# Patient Record
Sex: FEMALE | Race: WHITE | NOT HISPANIC OR LATINO | Employment: OTHER | URBAN - METROPOLITAN AREA
[De-identification: names, ages, dates, MRNs, and addresses within clinical notes are randomized per-mention and may not be internally consistent; named-entity substitution may affect disease eponyms.]

---

## 2017-03-09 ENCOUNTER — TRANSCRIBE ORDERS (OUTPATIENT)
Dept: MAMMOGRAPHY | Facility: HOSPITAL | Age: 82
End: 2017-03-09

## 2017-03-09 ENCOUNTER — HOSPITAL ENCOUNTER (OUTPATIENT)
Dept: MAMMOGRAPHY | Facility: HOSPITAL | Age: 82
Discharge: HOME | End: 2017-03-09
Attending: RADIOLOGY | Admitting: RADIOLOGY

## 2017-03-09 DIAGNOSIS — R92.8 ABNORMAL MAMMOGRAM: Primary | ICD-10-CM

## 2017-03-09 DIAGNOSIS — R92.8 ABNORMAL MAMMOGRAM: ICD-10-CM

## 2017-03-09 PROCEDURE — G0204 DX MAMMO INCL CAD BI: HCPCS | Performed by: RADIOLOGY

## 2017-03-09 PROCEDURE — G0279 TOMOSYNTHESIS, MAMMO: HCPCS | Performed by: RADIOLOGY

## 2017-03-09 PROCEDURE — G0279 TOMOSYNTHESIS, MAMMO: HCPCS

## 2017-03-09 PROCEDURE — G0204 DX MAMMO INCL CAD BI: HCPCS

## 2017-05-08 ENCOUNTER — APPOINTMENT (OUTPATIENT)
Dept: GENERAL RADIOLOGY | Facility: HOSPITAL | Age: 82
End: 2017-05-08

## 2017-05-08 ENCOUNTER — HOSPITAL ENCOUNTER (EMERGENCY)
Facility: HOSPITAL | Age: 82
Discharge: HOME | End: 2017-05-08
Attending: EMERGENCY MEDICINE | Admitting: EMERGENCY MEDICINE

## 2017-05-08 ENCOUNTER — APPOINTMENT (OUTPATIENT)
Dept: MRI IMAGING | Facility: HOSPITAL | Age: 82
End: 2017-05-08

## 2017-05-08 VITALS
HEIGHT: 62 IN | DIASTOLIC BLOOD PRESSURE: 62 MMHG | OXYGEN SATURATION: 97 % | HEART RATE: 80 BPM | WEIGHT: 189 LBS | BODY MASS INDEX: 34.78 KG/M2 | TEMPERATURE: 97.9 F | RESPIRATION RATE: 16 BRPM | SYSTOLIC BLOOD PRESSURE: 137 MMHG

## 2017-05-08 DIAGNOSIS — H53.9 VISUAL DISTURBANCE: ICD-10-CM

## 2017-05-08 DIAGNOSIS — G44.1 OTHER VASCULAR HEADACHE: ICD-10-CM

## 2017-05-08 DIAGNOSIS — I10 ESSENTIAL HYPERTENSION: Primary | ICD-10-CM

## 2017-05-08 LAB
ALBUMIN SERPL-MCNC: 4.7 G/DL (ref 3.2–4.8)
ALBUMIN/GLOB SERPL: 1.7 G/DL (ref 1.5–2.5)
ALP SERPL-CCNC: 66 U/L (ref 25–100)
ALT SERPL W P-5'-P-CCNC: 13 U/L (ref 7–40)
ANION GAP SERPL CALCULATED.3IONS-SCNC: 7 MMOL/L (ref 3–11)
AST SERPL-CCNC: 17 U/L (ref 0–33)
BACTERIA UR QL AUTO: NORMAL /HPF
BASOPHILS # BLD AUTO: 0.02 10*3/MM3 (ref 0–0.2)
BASOPHILS NFR BLD AUTO: 0.3 % (ref 0–1)
BILIRUB SERPL-MCNC: 0.6 MG/DL (ref 0.3–1.2)
BILIRUB UR QL STRIP: NEGATIVE
BUN BLD-MCNC: 16 MG/DL (ref 9–23)
BUN/CREAT SERPL: 22.9 (ref 7–25)
CALCIUM SPEC-SCNC: 10.4 MG/DL (ref 8.7–10.4)
CHLORIDE SERPL-SCNC: 102 MMOL/L (ref 99–109)
CLARITY UR: CLEAR
CO2 SERPL-SCNC: 30 MMOL/L (ref 20–31)
COLOR UR: YELLOW
CREAT BLD-MCNC: 0.7 MG/DL (ref 0.6–1.3)
DEPRECATED RDW RBC AUTO: 44 FL (ref 37–54)
EOSINOPHIL # BLD AUTO: 0.23 10*3/MM3 (ref 0.1–0.3)
EOSINOPHIL NFR BLD AUTO: 2.9 % (ref 0–3)
ERYTHROCYTE [DISTWIDTH] IN BLOOD BY AUTOMATED COUNT: 13.4 % (ref 11.3–14.5)
GFR SERPL CREATININE-BSD FRML MDRD: 80 ML/MIN/1.73
GLOBULIN UR ELPH-MCNC: 2.7 GM/DL
GLUCOSE BLD-MCNC: 111 MG/DL (ref 70–100)
GLUCOSE UR STRIP-MCNC: NEGATIVE MG/DL
HCT VFR BLD AUTO: 43.7 % (ref 34.5–44)
HGB BLD-MCNC: 14.4 G/DL (ref 11.5–15.5)
HGB UR QL STRIP.AUTO: NEGATIVE
HOLD SPECIMEN: NORMAL
HOLD SPECIMEN: NORMAL
HYALINE CASTS UR QL AUTO: NORMAL /LPF
IMM GRANULOCYTES # BLD: 0.01 10*3/MM3 (ref 0–0.03)
IMM GRANULOCYTES NFR BLD: 0.1 % (ref 0–0.6)
KETONES UR QL STRIP: NEGATIVE
LEUKOCYTE ESTERASE UR QL STRIP.AUTO: ABNORMAL
LYMPHOCYTES # BLD AUTO: 1.76 10*3/MM3 (ref 0.6–4.8)
LYMPHOCYTES NFR BLD AUTO: 22.2 % (ref 24–44)
MAGNESIUM SERPL-MCNC: 2.1 MG/DL (ref 1.3–2.7)
MCH RBC QN AUTO: 29.3 PG (ref 27–31)
MCHC RBC AUTO-ENTMCNC: 33 G/DL (ref 32–36)
MCV RBC AUTO: 89 FL (ref 80–99)
MONOCYTES # BLD AUTO: 0.53 10*3/MM3 (ref 0–1)
MONOCYTES NFR BLD AUTO: 6.7 % (ref 0–12)
NEUTROPHILS # BLD AUTO: 5.37 10*3/MM3 (ref 1.5–8.3)
NEUTROPHILS NFR BLD AUTO: 67.8 % (ref 41–71)
NITRITE UR QL STRIP: NEGATIVE
PH UR STRIP.AUTO: 7 [PH] (ref 5–8)
PLATELET # BLD AUTO: 218 10*3/MM3 (ref 150–450)
PMV BLD AUTO: 9.8 FL (ref 6–12)
POTASSIUM BLD-SCNC: 3.7 MMOL/L (ref 3.5–5.5)
PROT SERPL-MCNC: 7.4 G/DL (ref 5.7–8.2)
PROT UR QL STRIP: NEGATIVE
RBC # BLD AUTO: 4.91 10*6/MM3 (ref 3.89–5.14)
RBC # UR: NORMAL /HPF
REF LAB TEST METHOD: NORMAL
SODIUM BLD-SCNC: 139 MMOL/L (ref 132–146)
SP GR UR STRIP: 1.01 (ref 1–1.03)
SQUAMOUS #/AREA URNS HPF: NORMAL /HPF
TROPONIN I SERPL-MCNC: 0 NG/ML (ref 0–0.07)
TROPONIN I SERPL-MCNC: 0 NG/ML (ref 0–0.07)
UROBILINOGEN UR QL STRIP: ABNORMAL
WBC NRBC COR # BLD: 7.92 10*3/MM3 (ref 3.5–10.8)
WBC UR QL AUTO: NORMAL /HPF
WHOLE BLOOD HOLD SPECIMEN: NORMAL
WHOLE BLOOD HOLD SPECIMEN: NORMAL

## 2017-05-08 PROCEDURE — 85025 COMPLETE CBC W/AUTO DIFF WBC: CPT | Performed by: EMERGENCY MEDICINE

## 2017-05-08 PROCEDURE — 93005 ELECTROCARDIOGRAM TRACING: CPT

## 2017-05-08 PROCEDURE — 96374 THER/PROPH/DIAG INJ IV PUSH: CPT

## 2017-05-08 PROCEDURE — 71010 HC CHEST PA OR AP: CPT

## 2017-05-08 PROCEDURE — 83735 ASSAY OF MAGNESIUM: CPT | Performed by: EMERGENCY MEDICINE

## 2017-05-08 PROCEDURE — 99284 EMERGENCY DEPT VISIT MOD MDM: CPT

## 2017-05-08 PROCEDURE — 93005 ELECTROCARDIOGRAM TRACING: CPT | Performed by: EMERGENCY MEDICINE

## 2017-05-08 PROCEDURE — 81001 URINALYSIS AUTO W/SCOPE: CPT | Performed by: EMERGENCY MEDICINE

## 2017-05-08 PROCEDURE — 84484 ASSAY OF TROPONIN QUANT: CPT

## 2017-05-08 PROCEDURE — 25010000002 HYDRALAZINE PER 20 MG: Performed by: EMERGENCY MEDICINE

## 2017-05-08 PROCEDURE — 80053 COMPREHEN METABOLIC PANEL: CPT | Performed by: EMERGENCY MEDICINE

## 2017-05-08 PROCEDURE — 70551 MRI BRAIN STEM W/O DYE: CPT

## 2017-05-08 RX ORDER — PROPRANOLOL HYDROCHLORIDE 10 MG/1
10 TABLET ORAL ONCE
Status: COMPLETED | OUTPATIENT
Start: 2017-05-08 | End: 2017-05-08

## 2017-05-08 RX ORDER — PROPRANOLOL HYDROCHLORIDE 10 MG/1
20 TABLET ORAL 2 TIMES DAILY
Qty: 60 TABLET | Refills: 0 | Status: SHIPPED | OUTPATIENT
Start: 2017-05-08 | End: 2019-09-16 | Stop reason: DRUGHIGH

## 2017-05-08 RX ORDER — PROPRANOLOL HYDROCHLORIDE 10 MG/1
10 TABLET ORAL 2 TIMES DAILY
COMMUNITY
End: 2017-05-08

## 2017-05-08 RX ORDER — SODIUM CHLORIDE 0.9 % (FLUSH) 0.9 %
10 SYRINGE (ML) INJECTION AS NEEDED
Status: DISCONTINUED | OUTPATIENT
Start: 2017-05-08 | End: 2017-05-08 | Stop reason: HOSPADM

## 2017-05-08 RX ORDER — ATORVASTATIN CALCIUM 40 MG/1
TABLET, FILM COATED ORAL
COMMUNITY
Start: 2014-01-14 | End: 2017-09-08

## 2017-05-08 RX ORDER — HYDRALAZINE HYDROCHLORIDE 20 MG/ML
20 INJECTION INTRAMUSCULAR; INTRAVENOUS ONCE
Status: COMPLETED | OUTPATIENT
Start: 2017-05-08 | End: 2017-05-08

## 2017-05-08 RX ORDER — LISINOPRIL AND HYDROCHLOROTHIAZIDE 20; 12.5 MG/1; MG/1
TABLET ORAL
COMMUNITY
Start: 2013-08-22 | End: 2021-11-15

## 2017-05-08 RX ORDER — AMLODIPINE BESYLATE 10 MG/1
10 TABLET ORAL DAILY
COMMUNITY
End: 2021-11-15

## 2017-05-08 RX ORDER — ASPIRIN 81 MG/1
81 TABLET, CHEWABLE ORAL DAILY
COMMUNITY
End: 2022-03-16

## 2017-05-08 RX ADMIN — PROPRANOLOL HYDROCHLORIDE 10 MG: 10 TABLET ORAL at 18:08

## 2017-05-08 RX ADMIN — HYDRALAZINE HYDROCHLORIDE 20 MG: 20 INJECTION INTRAMUSCULAR; INTRAVENOUS at 17:55

## 2017-09-07 ENCOUNTER — TRANSCRIBE ORDERS (OUTPATIENT)
Dept: MAMMOGRAPHY | Facility: HOSPITAL | Age: 82
End: 2017-09-07

## 2017-09-07 ENCOUNTER — HOSPITAL ENCOUNTER (OUTPATIENT)
Dept: MAMMOGRAPHY | Facility: HOSPITAL | Age: 82
Discharge: HOME | End: 2017-09-07
Attending: RADIOLOGY | Admitting: RADIOLOGY

## 2017-09-07 DIAGNOSIS — R92.8 ABNORMAL MAMMOGRAM: ICD-10-CM

## 2017-09-07 DIAGNOSIS — R92.8 ABNORMAL MAMMOGRAM: Primary | ICD-10-CM

## 2017-09-07 PROCEDURE — G0206 DX MAMMO INCL CAD UNI: HCPCS | Performed by: RADIOLOGY

## 2017-09-07 PROCEDURE — G0206 DX MAMMO INCL CAD UNI: HCPCS

## 2018-03-08 ENCOUNTER — HOSPITAL ENCOUNTER (OUTPATIENT)
Dept: MAMMOGRAPHY | Facility: HOSPITAL | Age: 83
Discharge: HOME | End: 2018-03-08
Attending: RADIOLOGY | Admitting: RADIOLOGY

## 2018-03-08 DIAGNOSIS — R92.8 ABNORMAL MAMMOGRAM: ICD-10-CM

## 2018-03-08 PROCEDURE — 77066 DX MAMMO INCL CAD BI: CPT | Performed by: RADIOLOGY

## 2018-03-08 PROCEDURE — 77066 DX MAMMO INCL CAD BI: CPT

## 2018-03-08 PROCEDURE — G0279 TOMOSYNTHESIS, MAMMO: HCPCS | Performed by: RADIOLOGY

## 2018-03-08 PROCEDURE — G0279 TOMOSYNTHESIS, MAMMO: HCPCS

## 2018-03-29 ENCOUNTER — TRANSCRIBE ORDERS (OUTPATIENT)
Dept: ADMINISTRATIVE | Facility: HOSPITAL | Age: 83
End: 2018-03-29

## 2018-03-29 DIAGNOSIS — R92.8 ABNORMAL MAMMOGRAM: Primary | ICD-10-CM

## 2019-03-14 ENCOUNTER — HOSPITAL ENCOUNTER (OUTPATIENT)
Dept: MAMMOGRAPHY | Facility: HOSPITAL | Age: 84
Discharge: HOME | End: 2019-03-14
Attending: RADIOLOGY | Admitting: RADIOLOGY

## 2019-03-14 DIAGNOSIS — R92.8 ABNORMAL MAMMOGRAM: ICD-10-CM

## 2019-03-14 PROCEDURE — G0279 TOMOSYNTHESIS, MAMMO: HCPCS | Performed by: RADIOLOGY

## 2019-03-14 PROCEDURE — 77066 DX MAMMO INCL CAD BI: CPT | Performed by: RADIOLOGY

## 2019-03-14 PROCEDURE — G0279 TOMOSYNTHESIS, MAMMO: HCPCS

## 2019-03-14 PROCEDURE — 77066 DX MAMMO INCL CAD BI: CPT

## 2019-03-22 ENCOUNTER — TRANSCRIBE ORDERS (OUTPATIENT)
Dept: RADIATION ONCOLOGY | Facility: HOSPITAL | Age: 84
End: 2019-03-22

## 2019-03-22 DIAGNOSIS — Z12.31 VISIT FOR SCREENING MAMMOGRAM: Primary | ICD-10-CM

## 2019-05-13 ENCOUNTER — OFFICE VISIT (OUTPATIENT)
Dept: ORTHOPEDIC SURGERY | Facility: CLINIC | Age: 84
End: 2019-05-13

## 2019-05-13 VITALS — BODY MASS INDEX: 34.12 KG/M2 | HEIGHT: 62 IN | HEART RATE: 89 BPM | WEIGHT: 185.41 LBS | OXYGEN SATURATION: 99 %

## 2019-05-13 DIAGNOSIS — M25.552 LEFT HIP PAIN: Primary | ICD-10-CM

## 2019-05-13 PROCEDURE — 99213 OFFICE O/P EST LOW 20 MIN: CPT | Performed by: ORTHOPAEDIC SURGERY

## 2019-05-13 RX ORDER — PROPRANOLOL HYDROCHLORIDE 20 MG/1
TABLET ORAL
Refills: 3 | COMMUNITY
Start: 2019-04-30

## 2019-05-13 NOTE — PROGRESS NOTES
[facility] [place] [facility]opa[facility] Clinic Note    Subjective     Chief Complaint   Patient presents with   • Left Hip - Pain     Patient goes to a work out class three times a week. She says that her pain hurts afterwards in the left hip.        HPI    Beth Fregoso is a 83 y.o. female.  She presents today for evaluation of left posterior hip pain.  The pain is been present for approximately 2 months, following a particular injury.  The pain radiates down the posterior aspect of the thigh down below the knee.  No groin pain.  The pain is 2-3 out of 10 currently, and is dull in quality.  It worsens with walking and exercise.      There is no problem list on file for this patient.    Past Medical History:   Diagnosis Date   • Breast cancer (CMS/HCC) 2014    Left    • Hx of radiation therapy 2014   • Hypertension       Past Surgical History:   Procedure Laterality Date   • BREAST BIOPSY Left 2014   • BREAST LUMPECTOMY Left 2014   • REDUCTION MAMMAPLASTY        Family History   Problem Relation Age of Onset   • Breast cancer Neg Hx    • Ovarian cancer Neg Hx      Social History     Socioeconomic History   • Marital status:      Spouse name: Not on file   • Number of children: Not on file   • Years of education: Not on file   • Highest education level: Not on file   Tobacco Use   • Smoking status: Never Smoker   • Smokeless tobacco: Never Used   Substance and Sexual Activity   • Alcohol use: Yes     Comment: SOCIAL   • Drug use: No   • Sexual activity: Defer      Current Outpatient Medications on File Prior to Visit   Medication Sig Dispense Refill   • amLODIPine (NORVASC) 10 MG tablet Take 10 mg by mouth Daily.     • aspirin 81 MG chewable tablet Chew 81 mg Daily.     • CALCIUM PO Take  by mouth.     • Cholecalciferol (VITAMIN D PO) Take  by mouth.     • FOLIC ACID PO Take  by mouth.     • lisinopril-hydrochlorothiazide (PRINZIDE,ZESTORETIC) 20-12.5 MG per tablet Take  by mouth.     • Multiple Vitamin (MULTI VITAMIN  "PO) Take  by mouth.     • propranolol (INDERAL) 10 MG tablet Take 2 tablets by mouth 2 (Two) Times a Day. 60 tablet 0   • propranolol (INDERAL) 20 MG tablet   3     No current facility-administered medications on file prior to visit.       No Known Allergies     Review of Systems   Constitutional: Positive for activity change.   HENT: Negative.    Eyes: Negative.    Respiratory: Negative.    Cardiovascular: Negative.    Gastrointestinal: Negative.    Endocrine: Negative.    Genitourinary: Negative.    Musculoskeletal: Positive for arthralgias.   Skin: Negative.    Allergic/Immunologic: Negative.    Neurological: Negative.    Hematological: Negative.    Psychiatric/Behavioral: Negative.         Objective      Physical Exam  Pulse 89   Ht 157.5 cm (62.01\")   Wt 84.1 kg (185 lb 6.5 oz)   SpO2 99%   Breastfeeding? No   BMI 33.90 kg/m²     Body mass index is 33.9 kg/m².    General:   Mental Status:  Alert   Appearance: Cooperative, in no acute distress   Build and Nutrition: Overweight female   Orientation: Alert and oriented to person, place and time   Posture: Normal   Gait: Normal    Integument:   Left hip: No skin lesions, no rash, no ecchymosis    Neurologic:   Sensation:    Left foot: Intact to light touch on the dorsal and plantar aspect   Motor:  Left lower extremity: 5/5 quadriceps, hamstrings, ankle dorsiflexors, and ankle plantar flexors  Vascular:   Left lower extremity: 2+ dorsalis pedis pulse, prompt capillary refill    Lower Extremity:   Left Hip:    Tenderness:  None    Swelling:  None    Crepitus:  None    Atrophy:  None    Range of motion:  External Rotation: 30°       Internal Rotation: 30°       Flexion:  100°       Extension:  0°   Instability:  None  Deformities:  None  Functional testing: Negative Stinchfield    No leg length discrepancy    Positive straight leg raise      Imaging/Studies      Imaging Results (last 24 hours)     Procedure Component Value Units Date/Time    XR Hip With or " Without Pelvis 1 View Left [[id number]] Resulted:  05/13/19 1137     Updated:  05/13/19 1138    Narrative:       Left Hip Radiographs  Indication: left hip pain  Views: low AP pelvis and lateral of the left hip    Comparison: no prior studies available for review    Findings:   Arthritic changes are seen, with spurs off of the femoral head, joint   space narrowing, irregularity at the greater tuberosity, with no acute   bony abnormalities.    Impression: Left hip arthritis          Assessment and Plan     Beth was seen today for pain.    Diagnoses and all orders for this visit:    Left hip pain  -     XR Hip With or Without Pelvis 1 View Left  -     Ambulatory Referral to Orthopedic Surgery        1. Left hip pain        I reviewed my findings with the patient today.  She has left posterior hip pain, that radiates down the posterior aspect of the thigh and into the leg.  I suspect that this is coming from her back, and have recommended referral to a spine specialist.  I will be happy to see her back if this seems to localize more to the hip joint.    Return if symptoms worsen or fail to improve.      Medical Decision Making  Data/Risk: radiology tests and independent visualization of imaging, lab tests, or EMG/NCV      Juan A Isaacs MD  05/13/19  7:24 PM

## 2019-08-11 PROCEDURE — 96374 THER/PROPH/DIAG INJ IV PUSH: CPT

## 2019-08-11 PROCEDURE — 96375 TX/PRO/DX INJ NEW DRUG ADDON: CPT

## 2019-08-11 PROCEDURE — 99284 EMERGENCY DEPT VISIT MOD MDM: CPT

## 2019-08-12 ENCOUNTER — APPOINTMENT (OUTPATIENT)
Dept: MRI IMAGING | Facility: HOSPITAL | Age: 84
End: 2019-08-12

## 2019-08-12 ENCOUNTER — HOSPITAL ENCOUNTER (EMERGENCY)
Facility: HOSPITAL | Age: 84
Discharge: HOME | End: 2019-08-12
Attending: EMERGENCY MEDICINE | Admitting: EMERGENCY MEDICINE

## 2019-08-12 VITALS
OXYGEN SATURATION: 96 % | SYSTOLIC BLOOD PRESSURE: 104 MMHG | DIASTOLIC BLOOD PRESSURE: 79 MMHG | TEMPERATURE: 98.1 F | HEIGHT: 61 IN | RESPIRATION RATE: 16 BRPM | WEIGHT: 178 LBS | BODY MASS INDEX: 33.61 KG/M2 | HEART RATE: 71 BPM

## 2019-08-12 DIAGNOSIS — E87.1 HYPONATREMIA: ICD-10-CM

## 2019-08-12 DIAGNOSIS — F41.9 ANXIETY: ICD-10-CM

## 2019-08-12 DIAGNOSIS — I10 ELEVATED BLOOD PRESSURE READING WITH DIAGNOSIS OF HYPERTENSION: Primary | ICD-10-CM

## 2019-08-12 LAB
ANION GAP SERPL CALCULATED.3IONS-SCNC: 13 MMOL/L (ref 5–15)
BASOPHILS # BLD AUTO: 0.05 10*3/MM3 (ref 0–0.2)
BASOPHILS NFR BLD AUTO: 0.6 % (ref 0–1.5)
BILIRUB UR QL STRIP: NEGATIVE
BUN BLD-MCNC: 14 MG/DL (ref 8–23)
BUN/CREAT SERPL: 22.2 (ref 7–25)
CALCIUM SPEC-SCNC: 9.6 MG/DL (ref 8.6–10.5)
CHLORIDE SERPL-SCNC: 87 MMOL/L (ref 98–107)
CLARITY UR: CLEAR
CO2 SERPL-SCNC: 30 MMOL/L (ref 22–29)
COLOR UR: NORMAL
CREAT BLD-MCNC: 0.63 MG/DL (ref 0.57–1)
DEPRECATED RDW RBC AUTO: 38.1 FL (ref 37–54)
EOSINOPHIL # BLD AUTO: 0.2 10*3/MM3 (ref 0–0.4)
EOSINOPHIL NFR BLD AUTO: 2.5 % (ref 0.3–6.2)
ERYTHROCYTE [DISTWIDTH] IN BLOOD BY AUTOMATED COUNT: 12.3 % (ref 12.3–15.4)
GFR SERPL CREATININE-BSD FRML MDRD: 90 ML/MIN/1.73
GLUCOSE BLD-MCNC: 122 MG/DL (ref 65–99)
GLUCOSE UR STRIP-MCNC: NEGATIVE MG/DL
HCT VFR BLD AUTO: 43.1 % (ref 34–46.6)
HGB BLD-MCNC: 14.6 G/DL (ref 12–15.9)
HGB UR QL STRIP.AUTO: NEGATIVE
IMM GRANULOCYTES # BLD AUTO: 0.03 10*3/MM3 (ref 0–0.05)
IMM GRANULOCYTES NFR BLD AUTO: 0.4 % (ref 0–0.5)
INR PPP: 1.02 (ref 0.85–1.16)
KETONES UR QL STRIP: NEGATIVE
LEUKOCYTE ESTERASE UR QL STRIP.AUTO: NEGATIVE
LYMPHOCYTES # BLD AUTO: 1.74 10*3/MM3 (ref 0.7–3.1)
LYMPHOCYTES NFR BLD AUTO: 21.8 % (ref 19.6–45.3)
MCH RBC QN AUTO: 29 PG (ref 26.6–33)
MCHC RBC AUTO-ENTMCNC: 33.9 G/DL (ref 31.5–35.7)
MCV RBC AUTO: 85.7 FL (ref 79–97)
MONOCYTES # BLD AUTO: 0.79 10*3/MM3 (ref 0.1–0.9)
MONOCYTES NFR BLD AUTO: 9.9 % (ref 5–12)
NEUTROPHILS # BLD AUTO: 5.18 10*3/MM3 (ref 1.7–7)
NEUTROPHILS NFR BLD AUTO: 64.8 % (ref 42.7–76)
NITRITE UR QL STRIP: NEGATIVE
NRBC BLD AUTO-RTO: 0 /100 WBC (ref 0–0.2)
PH UR STRIP.AUTO: 7.5 [PH] (ref 5–8)
PLATELET # BLD AUTO: 289 10*3/MM3 (ref 140–450)
PMV BLD AUTO: 9.6 FL (ref 6–12)
POTASSIUM BLD-SCNC: 4.1 MMOL/L (ref 3.5–5.2)
PROT UR QL STRIP: NEGATIVE
PROTHROMBIN TIME: 12.9 SECONDS (ref 11.2–14.3)
RBC # BLD AUTO: 5.03 10*6/MM3 (ref 3.77–5.28)
SODIUM BLD-SCNC: 130 MMOL/L (ref 136–145)
SP GR UR STRIP: 1.01 (ref 1–1.03)
UROBILINOGEN UR QL STRIP: NORMAL
WBC NRBC COR # BLD: 7.99 10*3/MM3 (ref 3.4–10.8)

## 2019-08-12 PROCEDURE — 85610 PROTHROMBIN TIME: CPT | Performed by: EMERGENCY MEDICINE

## 2019-08-12 PROCEDURE — 96375 TX/PRO/DX INJ NEW DRUG ADDON: CPT

## 2019-08-12 PROCEDURE — 81003 URINALYSIS AUTO W/O SCOPE: CPT | Performed by: EMERGENCY MEDICINE

## 2019-08-12 PROCEDURE — 85025 COMPLETE CBC W/AUTO DIFF WBC: CPT | Performed by: EMERGENCY MEDICINE

## 2019-08-12 PROCEDURE — 25010000002 LORAZEPAM PER 2 MG: Performed by: EMERGENCY MEDICINE

## 2019-08-12 PROCEDURE — 93005 ELECTROCARDIOGRAM TRACING: CPT | Performed by: EMERGENCY MEDICINE

## 2019-08-12 PROCEDURE — 80048 BASIC METABOLIC PNL TOTAL CA: CPT | Performed by: EMERGENCY MEDICINE

## 2019-08-12 PROCEDURE — 70551 MRI BRAIN STEM W/O DYE: CPT

## 2019-08-12 PROCEDURE — 96374 THER/PROPH/DIAG INJ IV PUSH: CPT

## 2019-08-12 RX ORDER — METOPROLOL TARTRATE 5 MG/5ML
5 INJECTION INTRAVENOUS ONCE
Status: COMPLETED | OUTPATIENT
Start: 2019-08-12 | End: 2019-08-12

## 2019-08-12 RX ORDER — LORAZEPAM 2 MG/ML
1 INJECTION INTRAMUSCULAR ONCE
Status: COMPLETED | OUTPATIENT
Start: 2019-08-12 | End: 2019-08-12

## 2019-08-12 RX ORDER — SODIUM CHLORIDE 0.9 % (FLUSH) 0.9 %
10 SYRINGE (ML) INJECTION AS NEEDED
Status: DISCONTINUED | OUTPATIENT
Start: 2019-08-12 | End: 2019-08-12 | Stop reason: HOSPADM

## 2019-08-12 RX ADMIN — METOPROLOL TARTRATE 5 MG: 5 INJECTION INTRAVENOUS at 02:10

## 2019-08-12 RX ADMIN — LORAZEPAM 1 MG: 2 INJECTION INTRAMUSCULAR; INTRAVENOUS at 02:41

## 2019-08-12 NOTE — DISCHARGE INSTRUCTIONS
Follow up with your primary care provider in 1 week for reevaluation.    Return to the ER if you develop any new or worsening symptoms.    Please review the medications you are supposed to be taking according to prior physician recommendations. I have not changed your home medications during this visit. If your discharge instructions indicate that I have changed your home medications, this is not the case, and you should disregard. If you have any questions about the medication you should be taking at home, please call your physician.     Please check your blood pressure 3 times a day. Keep a chart of these readings and any correlation to symptoms you might be having and bring this chart to the follow up with your primary care physician. If you don't already have a good blood pressure cuff, I recommend the following:    [employer] - Original Blood Pressure Monitor (for upper arm)    or similar upper arm blood pressure cuffs which can be purchased for around $25.

## 2019-08-12 NOTE — ED PROVIDER NOTES
Subjective   83-year-old female presents for evaluation of elevated blood pressure readings.  Of note, the patient has a history of hypertension.  She states that throughout the day today she has been checking her blood pressure and it seems to be creeping upward.  The more that she thinks about it, the more her blood pressure seems to be going up.  She tried taking an extra dose of her antihypertensive medication tonight without any significant change in her blood pressure readings.  She states that she felt a little bit dizzy tonight but currently feels well.  She denies any chest pain.  No headache, dizziness, blurred vision, shortness of breath, palpitations, or other systemic symptoms.  She states that she is simply worried about her blood pressure readings and it is making her feel quite anxious.        History provided by:  Patient  Hypertension   Severity:  Moderate  Duration:  5 hours  Timing:  Constant  Progression:  Worsening  Chronicity:  Recurrent  Time since last dose of antihypertensive:  4 hours  Notable PTA blood pressures:  Systolic pressure greater than 200  Relieved by:  Nothing  Ineffective treatments:  ACE inhibitors and Ca channel blockers  Associated symptoms: dizziness        Review of Systems   Gastrointestinal:        Positive for excessive burping.   Neurological: Positive for dizziness.   All other systems reviewed and are negative.      Past Medical History:   Diagnosis Date   • Breast cancer (CMS/HCC) 2014    Left    • Hx of radiation therapy 2014   • Hypertension        No Known Allergies    Past Surgical History:   Procedure Laterality Date   • BREAST BIOPSY Left 2014   • BREAST LUMPECTOMY Left 2014   • REDUCTION MAMMAPLASTY         Family History   Problem Relation Age of Onset   • Breast cancer Neg Hx    • Ovarian cancer Neg Hx        Social History     Socioeconomic History   • Marital status:      Spouse name: Not on file   • Number of children: Not on file   • Years of  education: Not on file   • Highest education level: Not on file   Tobacco Use   • Smoking status: Never Smoker   • Smokeless tobacco: Never Used   Substance and Sexual Activity   • Alcohol use: Yes     Comment: SOCIAL   • Drug use: No   • Sexual activity: Defer         Objective   Physical Exam   Constitutional: She is oriented to person, place, and time. She appears well-developed and well-nourished. No distress.   Well-appearing female in no acute distress   HENT:   Head: Normocephalic and atraumatic.   Mouth/Throat: Oropharynx is clear and moist. No oropharyngeal exudate.   Eyes: EOM are normal. Pupils are equal, round, and reactive to light.   No nystagmus, visual acuity at baseline subjectively   Neck: Normal range of motion. Neck supple.   Cardiovascular: Normal rate, regular rhythm, normal heart sounds and intact distal pulses. Exam reveals no gallop and no friction rub.   No murmur heard.  Pulmonary/Chest: Effort normal and breath sounds normal. No respiratory distress. She has no wheezes. She has no rales.   Abdominal: Soft. Bowel sounds are normal. She exhibits no distension and no mass. There is no tenderness. There is no rebound and no guarding.   Musculoskeletal: Normal range of motion.   Neurological: She is alert and oriented to person, place, and time. No cranial nerve deficit or sensory deficit. Coordination normal.   5 out of 5 strength in all fours, neurovascularly intact distally in all fours of bounding distal pulses and normal sensation, normal gait, clear and fluent speech, no droop, no drift, no ataxia or dysmetria   Skin: Skin is warm and dry. No rash noted. She is not diaphoretic. No erythema.   Psychiatric: She has a normal mood and affect. Judgment and thought content normal.   Nursing note and vitals reviewed.      Procedures         ED Course  ED Course as of Aug 12 0503   Mon Aug 12, 2019   0341 83-year-old female with a history of hypertension presents for evaluation of elevated blood  pressure readings tonight.  She states that she had some mild dizziness earlier in the evening that has resolved.  However, she states that despite taking an extra dose of her blood pressure medication at night, her blood pressure readings continue to rise, prompting her to come to the ED.  On arrival, patient hypertensive and a bit anxious but with otherwise unremarkable exam.  No focal neurological deficits noted.  NIH stroke scale of 0.  Labs remarkable for mild hyponatremia but revealed no other evidence of endorgan damage.  [DD]   0342 EKG revealed normal sinus rhythm with a heart rate of 75 and no ST segments suggestive of are concerning for ischemia.  Low dose of Ativan given for anxiolysis.  Small bolus of Lopressor given for elevated blood pressure.  [DD]   0401 MRI brain negative.  Upon reevaluation, patient completely asymptomatic.  Her blood pressure has normalized.  I do not feel that her current clinical presentation represents hypertensive emergency or hypertensive encephalopathy.  I feel that there was likely an anxiety component to her persistent elevated blood pressure readings as well.  Patient reassured.  She will continue to closely monitor her blood pressure over the coming days prior to following up with her primary care physician to discuss potential tweaks to her antihypertensive medication regimen.  She will follow-up later this week.  Agreeable with plan and given appropriate strict return precautions.  [DD]      ED Course User Index  [DD] Paul Harrison MD       No results found for this or any previous visit (from the past 24 hour(s)).  Note: In addition to lab results from this visit, the labs listed above may include labs taken at another facility or during a different encounter within the last 24 hours. Please correlate lab times with ED admission and discharge times for further clarification of the services performed during this visit.    No orders to display     Vitals:     "08/11/19 2328   BP: (!) 205/81   BP Location: Right arm   Patient Position: Sitting   Pulse: 80   Resp: 16   Temp: 98.1 °F (36.7 °C)   TempSrc: Oral   SpO2: 96%   Weight: 80.7 kg (178 lb)   Height: 154.9 cm (61\")     Medications - No data to display  ECG/EMG Results (last 24 hours)     ** No results found for the last 24 hours. **        No orders to display                 Recent Results (from the past 24 hour(s))   Basic Metabolic Panel    Collection Time: 08/12/19  1:57 AM   Result Value Ref Range    Glucose 122 (H) 65 - 99 mg/dL    BUN 14 8 - 23 mg/dL    Creatinine 0.63 0.57 - 1.00 mg/dL    Sodium 130 (L) 136 - 145 mmol/L    Potassium 4.1 3.5 - 5.2 mmol/L    Chloride 87 (L) 98 - 107 mmol/L    CO2 30.0 (H) 22.0 - 29.0 mmol/L    Calcium 9.6 8.6 - 10.5 mg/dL    eGFR Non African Amer 90 >60 mL/min/1.73    BUN/Creatinine Ratio 22.2 7.0 - 25.0    Anion Gap 13.0 5.0 - 15.0 mmol/L   Urinalysis With Microscopic If Indicated (No Culture) - Urine, Clean Catch    Collection Time: 08/12/19  1:57 AM   Result Value Ref Range    Color, UA Straw Yellow, Straw    Appearance, UA Clear Clear    pH, UA 7.5 5.0 - 8.0    Specific Gravity, UA 1.010 1.005 - 1.030    Glucose, UA Negative Negative    Ketones, UA Negative Negative    Bilirubin, UA Negative Negative    Blood, UA Negative Negative    Protein, UA Negative Negative    Leuk Esterase, UA Negative Negative    Nitrite, UA Negative Negative    Urobilinogen, UA 0.2 E.U./dL 0.2 - 1.0 E.U./dL   Protime-INR    Collection Time: 08/12/19  1:57 AM   Result Value Ref Range    Protime 12.9 11.2 - 14.3 Seconds    INR 1.02 0.85 - 1.16   CBC Auto Differential    Collection Time: 08/12/19  1:57 AM   Result Value Ref Range    WBC 7.99 3.40 - 10.80 10*3/mm3    RBC 5.03 3.77 - 5.28 10*6/mm3    Hemoglobin 14.6 12.0 - 15.9 g/dL    Hematocrit 43.1 34.0 - 46.6 %    MCV 85.7 79.0 - 97.0 fL    MCH 29.0 26.6 - 33.0 pg    MCHC 33.9 31.5 - 35.7 g/dL    RDW 12.3 12.3 - 15.4 %    RDW-SD 38.1 37.0 - 54.0 fl "    MPV 9.6 6.0 - 12.0 fL    Platelets 289 140 - 450 10*3/mm3    Neutrophil % 64.8 42.7 - 76.0 %    Lymphocyte % 21.8 19.6 - 45.3 %    Monocyte % 9.9 5.0 - 12.0 %    Eosinophil % 2.5 0.3 - 6.2 %    Basophil % 0.6 0.0 - 1.5 %    Immature Grans % 0.4 0.0 - 0.5 %    Neutrophils, Absolute 5.18 1.70 - 7.00 10*3/mm3    Lymphocytes, Absolute 1.74 0.70 - 3.10 10*3/mm3    Monocytes, Absolute 0.79 0.10 - 0.90 10*3/mm3    Eosinophils, Absolute 0.20 0.00 - 0.40 10*3/mm3    Basophils, Absolute 0.05 0.00 - 0.20 10*3/mm3    Immature Grans, Absolute 0.03 0.00 - 0.05 10*3/mm3    nRBC 0.0 0.0 - 0.2 /100 WBC     Note: In addition to lab results from this visit, the labs listed above may include labs taken at another facility or during a different encounter within the last 24 hours. Please correlate lab times with ED admission and discharge times for further clarification of the services performed during this visit.    MRI Brain Without Contrast   Final Result        Vitals:    08/12/19 0347 08/12/19 0348 08/12/19 0358 08/12/19 0419   BP: 126/58   104/79   BP Location:    Right arm   Patient Position:    Sitting   Pulse:   60 71   Resp:   16 16   Temp:    98.1 °F (36.7 °C)   TempSrc:    Oral   SpO2:  95%  96%   Weight:       Height:         Medications   sodium chloride 0.9 % flush 10 mL (not administered)   LORazepam (ATIVAN) injection 1 mg (1 mg Intravenous Given 8/12/19 0241)   metoprolol tartrate (LOPRESSOR) injection 5 mg (5 mg Intravenous Given 8/12/19 0210)     ECG/EMG Results (last 24 hours)     Procedure Component Value Units Date/Time    ECG 12 Lead [[id number]] Collected:  08/12/19 0135     Updated:  08/12/19 0134        ECG 12 Lead                 MDM    Final diagnoses:   Elevated blood pressure reading with diagnosis of hypertension   Anxiety   Hyponatremia       Documentation assistance provided by naomi Perez.  Information recorded by the [email or web address] was done at my direction and has been verified and validated  by me.     Sherley Perez  08/12/19 0229       Paul Harrison MD  08/12/19 8417

## 2019-09-16 ENCOUNTER — OFFICE VISIT (OUTPATIENT)
Dept: ORTHOPEDIC SURGERY | Facility: CLINIC | Age: 84
End: 2019-09-16

## 2019-09-16 VITALS — WEIGHT: 178.35 LBS | HEART RATE: 64 BPM | HEIGHT: 61 IN | OXYGEN SATURATION: 97 % | BODY MASS INDEX: 33.67 KG/M2

## 2019-09-16 DIAGNOSIS — M25.512 CHRONIC LEFT SHOULDER PAIN: Primary | ICD-10-CM

## 2019-09-16 DIAGNOSIS — G89.29 CHRONIC LEFT SHOULDER PAIN: Primary | ICD-10-CM

## 2019-09-16 PROCEDURE — 99214 OFFICE O/P EST MOD 30 MIN: CPT | Performed by: ORTHOPAEDIC SURGERY

## 2019-09-16 PROCEDURE — 20610 DRAIN/INJ JOINT/BURSA W/O US: CPT | Performed by: ORTHOPAEDIC SURGERY

## 2019-09-16 RX ORDER — ALPRAZOLAM 0.5 MG/1
TABLET ORAL
Refills: 0 | COMMUNITY
Start: 2019-08-16

## 2019-09-16 RX ORDER — TRIAMCINOLONE ACETONIDE 40 MG/ML
40 INJECTION, SUSPENSION INTRA-ARTICULAR; INTRAMUSCULAR
Status: COMPLETED | OUTPATIENT
Start: 2019-09-16 | End: 2019-09-16

## 2019-09-16 RX ORDER — ROPIVACAINE HYDROCHLORIDE 5 MG/ML
4 INJECTION, SOLUTION EPIDURAL; INFILTRATION; PERINEURAL
Status: COMPLETED | OUTPATIENT
Start: 2019-09-16 | End: 2019-09-16

## 2019-09-16 RX ADMIN — ROPIVACAINE HYDROCHLORIDE 4 ML: 5 INJECTION, SOLUTION EPIDURAL; INFILTRATION; PERINEURAL at 11:22

## 2019-09-16 RX ADMIN — TRIAMCINOLONE ACETONIDE 40 MG: 40 INJECTION, SUSPENSION INTRA-ARTICULAR; INTRAMUSCULAR at 11:22

## 2019-09-16 NOTE — PROGRESS NOTES
[facility] [place] [facility] Note    Subjective     Chief Complaint   Patient presents with   • Left Shoulder - Pain        HPI    Beth Fregoso is a 84 y.o. female.  She presents today for evaluation of left shoulder pain.  The pain has been present for approximately a month, following no particular injury.  Pain is associated with stiffness, with 3 out of 10 pain, which is aching in quality.  No previous injections.      There is no problem list on file for this patient.    Past Medical History:   Diagnosis Date   • Breast cancer (CMS/HCC) 2014    Left    • Hx of radiation therapy 2014   • Hypertension       Past Surgical History:   Procedure Laterality Date   • BREAST BIOPSY Left 2014   • BREAST LUMPECTOMY Left 2014   • REDUCTION MAMMAPLASTY        Family History   Problem Relation Age of Onset   • Breast cancer Neg Hx    • Ovarian cancer Neg Hx      Social History     Socioeconomic History   • Marital status:      Spouse name: Not on file   • Number of children: Not on file   • Years of education: Not on file   • Highest education level: Not on file   Tobacco Use   • Smoking status: Never Smoker   • Smokeless tobacco: Never Used   Substance and Sexual Activity   • Alcohol use: Yes     Comment: SOCIAL   • Drug use: No   • Sexual activity: Defer      Current Outpatient Medications on File Prior to Visit   Medication Sig Dispense Refill   • ALPRAZolam (XANAX) 0.5 MG tablet   0   • amLODIPine (NORVASC) 10 MG tablet Take 10 mg by mouth Daily.     • aspirin 81 MG chewable tablet Chew 81 mg Daily.     • CALCIUM PO Take  by mouth.     • Cholecalciferol (VITAMIN D PO) Take  by mouth.     • FOLIC ACID PO Take  by mouth.     • lisinopril-hydrochlorothiazide (PRINZIDE,ZESTORETIC) 20-12.5 MG per tablet Take  by mouth.     • Multiple Vitamin (MULTI VITAMIN PO) Take  by mouth.     • propranolol (INDERAL) 20 MG tablet   3   • [DISCONTINUED] propranolol (INDERAL) 10 MG tablet Take 2 tablets by mouth 2 (Two)  Times a Day. 60 tablet 0     No current facility-administered medications on file prior to visit.       No Known Allergies     Review of Systems   Constitutional: Negative for activity change, appetite change, chills, diaphoresis, fatigue, fever and unexpected weight change.   HENT: Positive for tinnitus. Negative for congestion, dental problem, drooling, ear discharge, ear pain, facial swelling, hearing loss, mouth sores, nosebleeds, postnasal drip, rhinorrhea, sinus pressure, sneezing, sore throat, trouble swallowing and voice change.    Eyes: Negative for photophobia, pain, discharge, redness, itching and visual disturbance.   Respiratory: Positive for wheezing. Negative for apnea, cough, choking, chest tightness, shortness of breath and stridor.    Cardiovascular: Negative for chest pain, palpitations and leg swelling.   Gastrointestinal: Negative for abdominal distention, abdominal pain, anal bleeding, blood in stool, constipation, diarrhea, nausea, rectal pain and vomiting.   Endocrine: Negative for cold intolerance, heat intolerance, polydipsia, polyphagia and polyuria.   Genitourinary: Negative for decreased urine volume, difficulty urinating, dysuria, enuresis, flank pain, frequency, genital sores, hematuria and urgency.   Musculoskeletal: Positive for arthralgias and neck pain. Negative for back pain, gait problem, joint swelling, myalgias and neck stiffness.   Skin: Negative for color change, pallor, rash and wound.   Allergic/Immunologic: Negative for environmental allergies, food allergies and immunocompromised state.   Neurological: Positive for dizziness and light-headedness. Negative for tremors, seizures, syncope, facial asymmetry, speech difficulty, weakness, numbness and headaches.   Hematological: Negative for adenopathy. Does not bruise/bleed easily.   Psychiatric/Behavioral: Negative for agitation, behavioral problems, confusion, decreased concentration, dysphoric mood, hallucinations,  "self-injury, sleep disturbance and suicidal ideas. The patient is nervous/anxious. The patient is not hyperactive.         Objective      Physical Exam  Pulse 64   Ht 154.9 cm (60.98\")   Wt 80.9 kg (178 lb 5.6 oz)   SpO2 97%   Breastfeeding? No   BMI 33.72 kg/m²     Body mass index is 33.72 kg/m².    General:   Mental Status:  Alert   Appearance: Cooperative, in no acute distress   Build and Nutrition: Well-nourished well-developed female   Orientation: Alert and oriented to person, place and time   Posture: Normal   Gait: Normal    Integument:   Left shoulder: No skin lesions, no rash, no ecchymosis    Neurologic:   Sensation:    Left hand: Intact to light touch in the digits   Motor:  Left upper extremity: 5/5 deltoid, biceps, triceps, wrist flexors, wrist extensors, and interossei  Vascular:   Left upper extremity: prompt capillary refill    Upper Extremities:   Left Shoulder:    Tenderness:  None    Swelling:  None    Crepitus: None    Atrophy:  None    Range of motion:  External rotation:  40°       Forward flexion:  130°       Abduction:   110°  Instability:  None  Deformities:  None  Functional testing: Negative drop arm, negative lift-off, positive impingement      Imaging/Studies  Imaging Results (last 24 hours)     Procedure Component Value Units Date/Time    XR Shoulder 2+ View Left [[id number]] Resulted:  09/16/19 1102     Updated:  09/16/19 1103    Narrative:       Left Shoulder Radiographs  Indication: left shoulder pain  Views: AP, outlet and axillary views of the left shoulder    Comparison: no prior studies available for review    Findings:  Glenohumeral degeneration, with subacromial spurring, calcification just   superior to the greater tuberosity, with calcification seen below the   coracoid medially, with no acute bony abnormalities, with   acromioclavicular joint degeneration.              Assessment and Plan     Beth was seen today for pain.    Diagnoses and all orders for this " visit:    Chronic left shoulder pain  -     XR Shoulder 2+ View Left  -     Large Joint Arthrocentesis: L glenohumeral        1. Chronic left shoulder pain        I reviewed my findings with the patient today.  She has findings consistent with left shoulder glenohumeral arthritis, and we discussed her treatment options today, she would like to try an intra-articular injection.  Left glenohumeral joint was injected, and she had 100% relief just a few minutes following the injection.  I will see her back in 2 months, but sooner for any problems.    Return in about 2 months (around 11/16/2019).      Medical Decision Making  Management Options : prescription/IM medicine  Data/Risk: radiology tests and independent visualization of imaging, lab tests, or EMG/NCV      Juan A Isaacs MD  09/16/19  12:32 PM

## 2019-09-16 NOTE — PROGRESS NOTES
Procedure   Large Joint Arthrocentesis: L glenohumeral  Date/Time: 9/16/2019 11:22 AM  Consent given by: patient  Site marked: site marked  Timeout: Immediately prior to procedure a time out was called to verify the correct patient, procedure, equipment, support staff and site/side marked as required   Supporting Documentation  Indications: pain   Procedure Details  Location: shoulder - L glenohumeral  Preparation: Patient was prepped and draped in the usual sterile fashion  Needle size: 22 G  Approach: posterior  Medications administered: 4 mL ropivacaine 0.5 %; 40 mg triamcinolone acetonide 40 MG/ML  Patient tolerance: patient tolerated the procedure well with no immediate complications

## 2019-09-25 ENCOUNTER — TELEPHONE (OUTPATIENT)
Dept: ORTHOPEDIC SURGERY | Facility: CLINIC | Age: 84
End: 2019-09-25

## 2019-10-04 ENCOUNTER — OFFICE VISIT (OUTPATIENT)
Dept: ORTHOPEDIC SURGERY | Facility: CLINIC | Age: 84
End: 2019-10-04

## 2019-10-04 VITALS — BODY MASS INDEX: 33.67 KG/M2 | HEIGHT: 61 IN | HEART RATE: 76 BPM | OXYGEN SATURATION: 96 % | WEIGHT: 178.35 LBS

## 2019-10-04 DIAGNOSIS — M19.012 GLENOHUMERAL ARTHRITIS, LEFT: Primary | ICD-10-CM

## 2019-10-04 PROCEDURE — 99212 OFFICE O/P EST SF 10 MIN: CPT | Performed by: ORTHOPAEDIC SURGERY

## 2019-10-04 NOTE — PROGRESS NOTES
[facility] [place] [facility]    Subjective     Chief Complaint   Patient presents with   • Left Shoulder - Follow-up     2 week F/U - Left glenohumeral joint (09/16/2019), patient stated that she has a pain in her neck laterally when she turns her head.         HPI    Beth Fregoso is a 84 y.o. female.  She follows up today for her left shoulder, and she noted good relief with the intra-articular shoulder injection on her last visit.  She does have some pain on the side of her neck when she turns her head, without numbness or tingling.  Pain is aching in quality, and associated with stiffness.      There is no problem list on file for this patient.    Past Medical History:   Diagnosis Date   • Breast cancer (CMS/HCC) 2014    Left    • Hx of radiation therapy 2014   • Hypertension       Past Surgical History:   Procedure Laterality Date   • BREAST BIOPSY Left 2014   • BREAST LUMPECTOMY Left 2014   • REDUCTION MAMMAPLASTY        Family History   Problem Relation Age of Onset   • Breast cancer Neg Hx    • Ovarian cancer Neg Hx      Social History     Socioeconomic History   • Marital status:      Spouse name: Not on file   • Number of children: Not on file   • Years of education: Not on file   • Highest education level: Not on file   Tobacco Use   • Smoking status: Never Smoker   • Smokeless tobacco: Never Used   Substance and Sexual Activity   • Alcohol use: Yes     Comment: SOCIAL   • Drug use: No   • Sexual activity: Defer      Current Outpatient Medications on File Prior to Visit   Medication Sig Dispense Refill   • ALPRAZolam (XANAX) 0.5 MG tablet   0   • amLODIPine (NORVASC) 10 MG tablet Take 10 mg by mouth Daily.     • aspirin 81 MG chewable tablet Chew 81 mg Daily.     • CALCIUM PO Take  by mouth.     • Cholecalciferol (VITAMIN D PO) Take  by mouth.     • FOLIC ACID PO Take  by mouth.     • lisinopril-hydrochlorothiazide (PRINZIDE,ZESTORETIC) 20-12.5 MG per tablet Take  by mouth.     •  "Multiple Vitamin (MULTI VITAMIN PO) Take  by mouth.     • propranolol (INDERAL) 20 MG tablet   3     No current facility-administered medications on file prior to visit.       No Known Allergies     Review of Systems   Constitutional: Negative.    HENT: Positive for tinnitus.    Eyes: Negative.    Respiratory: Negative.    Cardiovascular: Negative.    Gastrointestinal: Negative.    Endocrine: Negative.    Genitourinary: Negative.    Musculoskeletal: Positive for arthralgias, neck pain and neck stiffness.   Skin: Negative.    Allergic/Immunologic: Negative.    Neurological: Negative.    Hematological: Negative.    Psychiatric/Behavioral: Negative.         Objective      Physical Exam  Pulse 76   Ht 154.9 cm (60.98\")   Wt 80.9 kg (178 lb 5.6 oz)   SpO2 96%   BMI 33.72 kg/m²     Body mass index is 33.72 kg/m².    General:   Mental Status:  Alert   Appearance: Cooperative, in no acute distress   Build and Nutrition: Well-nourished well-developed female   Orientation: Alert and oriented to person, place and time   Posture: Normal   Gait: Normal    Upper Extremities:              Left Shoulder:                          Tenderness:    None                          Swelling:          None                          Crepitus:          None                          Atrophy:           None                          Range of motion:        External rotation:         40°                                                              Forward flexion:          150°                                                              Abduction:                   140°  Instability:        None  Deformities:     None      Assessment and Plan     Beth was seen today for follow-up.    Diagnoses and all orders for this visit:    Glenohumeral arthritis, left        1. Glenohumeral arthritis, left        I reviewed my findings with the patient today.  Shoulder has improved with the intra-articular injection, and I will see her back as needed " for her shoulder.  I suspect that the pain she is having on the left side of her neck is due to arthritis, and she is not experiencing any radicular symptoms.  She has seen Dr. Juarez before, and I recommended that she see Dr. Juarez if she has any worsening or problems.    Return if symptoms worsen or fail to improve.      Juan A Isaacs MD  10/04/19  4:31 PM

## 2020-03-16 ENCOUNTER — TRANSCRIBE ORDERS (OUTPATIENT)
Dept: ADMINISTRATIVE | Facility: HOSPITAL | Age: 85
End: 2020-03-16

## 2020-03-16 DIAGNOSIS — Z12.31 VISIT FOR SCREENING MAMMOGRAM: Primary | ICD-10-CM

## 2020-03-20 ENCOUNTER — APPOINTMENT (OUTPATIENT)
Dept: MAMMOGRAPHY | Facility: HOSPITAL | Age: 85
End: 2020-03-20

## 2020-05-14 ENCOUNTER — APPOINTMENT (OUTPATIENT)
Dept: MAMMOGRAPHY | Facility: HOSPITAL | Age: 85
End: 2020-05-14

## 2020-08-02 ENCOUNTER — APPOINTMENT (OUTPATIENT)
Dept: PREADMISSION TESTING | Facility: HOSPITAL | Age: 85
End: 2020-08-02

## 2020-08-02 PROCEDURE — U0004 COV-19 TEST NON-CDC HGH THRU: HCPCS

## 2020-08-02 PROCEDURE — U0002 COVID-19 LAB TEST NON-CDC: HCPCS

## 2020-08-02 PROCEDURE — C9803 HOPD COVID-19 SPEC COLLECT: HCPCS

## 2020-08-03 LAB
REF LAB TEST METHOD: NORMAL
SARS-COV-2 RNA RESP QL NAA+PROBE: NOT DETECTED

## 2020-08-16 ENCOUNTER — APPOINTMENT (OUTPATIENT)
Dept: PREADMISSION TESTING | Facility: HOSPITAL | Age: 85
End: 2020-08-16

## 2020-08-16 PROCEDURE — U0002 COVID-19 LAB TEST NON-CDC: HCPCS

## 2020-08-16 PROCEDURE — C9803 HOPD COVID-19 SPEC COLLECT: HCPCS

## 2020-08-16 PROCEDURE — U0004 COV-19 TEST NON-CDC HGH THRU: HCPCS

## 2020-08-17 LAB
REF LAB TEST METHOD: NORMAL
SARS-COV-2 RNA RESP QL NAA+PROBE: NOT DETECTED

## 2020-09-08 ENCOUNTER — HOSPITAL ENCOUNTER (OUTPATIENT)
Dept: MAMMOGRAPHY | Facility: HOSPITAL | Age: 85
Discharge: HOME | End: 2020-09-08
Admitting: RADIOLOGY

## 2020-09-08 DIAGNOSIS — Z12.31 VISIT FOR SCREENING MAMMOGRAM: ICD-10-CM

## 2020-09-08 PROCEDURE — 77063 BREAST TOMOSYNTHESIS BI: CPT | Performed by: RADIOLOGY

## 2020-09-08 PROCEDURE — 77063 BREAST TOMOSYNTHESIS BI: CPT

## 2020-09-08 PROCEDURE — 77067 SCR MAMMO BI INCL CAD: CPT | Performed by: RADIOLOGY

## 2020-09-08 PROCEDURE — 77067 SCR MAMMO BI INCL CAD: CPT

## 2021-05-03 ENCOUNTER — OFFICE VISIT (OUTPATIENT)
Dept: ORTHOPEDIC SURGERY | Facility: CLINIC | Age: 86
End: 2021-05-03

## 2021-05-03 VITALS
WEIGHT: 181.8 LBS | HEART RATE: 74 BPM | DIASTOLIC BLOOD PRESSURE: 63 MMHG | HEIGHT: 61 IN | BODY MASS INDEX: 34.32 KG/M2 | SYSTOLIC BLOOD PRESSURE: 163 MMHG

## 2021-05-03 DIAGNOSIS — M17.12 PRIMARY OSTEOARTHRITIS OF LEFT KNEE: Primary | ICD-10-CM

## 2021-05-03 PROCEDURE — 99214 OFFICE O/P EST MOD 30 MIN: CPT | Performed by: ORTHOPAEDIC SURGERY

## 2021-05-03 RX ORDER — OLMESARTAN MEDOXOMIL 40 MG/1
TABLET ORAL
COMMUNITY
Start: 2021-04-05

## 2021-05-03 RX ORDER — ALBUTEROL SULFATE 90 UG/1
AEROSOL, METERED RESPIRATORY (INHALATION)
COMMUNITY
Start: 2021-04-27

## 2021-05-03 RX ORDER — DOXAZOSIN MESYLATE 4 MG/1
TABLET ORAL
COMMUNITY
Start: 2021-04-05

## 2021-05-03 RX ORDER — AMLODIPINE BESYLATE 5 MG/1
TABLET ORAL
COMMUNITY
Start: 2021-04-05

## 2021-05-03 NOTE — PROGRESS NOTES
[facility] [place] [facility]    Subjective     Chief Complaint   Patient presents with   • Left Knee - Pain        HPI    Beth Fregoso is a 85 y.o. female who presents with new problem of left knee pain. She describes an increased aching pain when lowering down to a seated position, which has been present for several weeks. She recently started using a stationary bike, which initially helped her hip; however, her knee has been painful since then. She denies any previous cortisone knee injections. Beth rates her pain as a 7 to 8 out of 10. She has increased pain with ambulation as well as ascending stairs. He denies any significant pain while resting; however, she does experience pain when rising from or lowering to a seated position.    Of note, the patient has a history of lumbar stenosis.     I have reviewed the following portions of the patient's history and agree with: History of Present Illness and Review of Systems    There is no problem list on file for this patient.    Past Medical History:   Diagnosis Date   • Breast cancer (CMS/HCC) 2014    Left    • Hx of radiation therapy 2014   • Hypertension       Past Surgical History:   Procedure Laterality Date   • BREAST BIOPSY Left 2014   • BREAST LUMPECTOMY Left 2014   • REDUCTION MAMMAPLASTY        Family History   Problem Relation Age of Onset   • Breast cancer Neg Hx    • Ovarian cancer Neg Hx      Social History     Socioeconomic History   • Marital status:      Spouse name: Not on file   • Number of children: Not on file   • Years of education: Not on file   • Highest education level: Not on file   Tobacco Use   • Smoking status: Never Smoker   • Smokeless tobacco: Never Used   Substance and Sexual Activity   • Alcohol use: Yes     Comment: SOCIAL   • Drug use: No   • Sexual activity: Defer      Current Outpatient Medications on File Prior to Visit   Medication Sig Dispense Refill   • albuterol sulfate  (90 Base) MCG/ACT  inhaler      • ALPRAZolam (XANAX) 0.5 MG tablet   0   • amLODIPine (NORVASC) 5 MG tablet      • CALCIUM PO Take  by mouth.     • Cholecalciferol (VITAMIN D PO) Take  by mouth.     • doxazosin (CARDURA) 4 MG tablet      • FOLIC ACID PO Take  by mouth.     • lisinopril-hydrochlorothiazide (PRINZIDE,ZESTORETIC) 20-12.5 MG per tablet Take  by mouth.     • Multiple Vitamin (MULTI VITAMIN PO) Take  by mouth.     • olmesartan (BENICAR) 40 MG tablet      • propranolol (INDERAL) 20 MG tablet   3   • amLODIPine (NORVASC) 10 MG tablet Take 10 mg by mouth Daily.     • aspirin 81 MG chewable tablet Chew 81 mg Daily.       No current facility-administered medications on file prior to visit.      No Known Allergies     Review of Systems   Constitutional: Negative for activity change, appetite change, chills, diaphoresis, fatigue, fever and unexpected weight change.   HENT: Negative for congestion, dental problem, drooling, ear discharge, ear pain, facial swelling, hearing loss, mouth sores, nosebleeds, postnasal drip, rhinorrhea, sinus pressure, sneezing, sore throat, tinnitus, trouble swallowing and voice change.    Eyes: Negative for photophobia, pain, discharge, redness, itching and visual disturbance.   Respiratory: Negative for apnea, cough, choking, chest tightness, shortness of breath, wheezing and stridor.    Cardiovascular: Negative for chest pain, palpitations and leg swelling.   Gastrointestinal: Negative for abdominal distention, abdominal pain, anal bleeding, blood in stool, constipation, diarrhea, nausea, rectal pain and vomiting.   Endocrine: Negative for cold intolerance, heat intolerance, polydipsia, polyphagia and polyuria.   Genitourinary: Negative for decreased urine volume, difficulty urinating, dysuria, enuresis, flank pain, frequency, genital sores, hematuria and urgency.   Musculoskeletal: Positive for arthralgias. Negative for back pain, gait problem, joint swelling, myalgias, neck pain and neck stiffness.  "  Skin: Negative for color change, pallor, rash and wound.   Allergic/Immunologic: Negative for environmental allergies, food allergies and immunocompromised state.   Neurological: Negative for dizziness, tremors, seizures, syncope, facial asymmetry, speech difficulty, weakness, light-headedness, numbness and headaches.   Hematological: Negative for adenopathy. Does not bruise/bleed easily.   Psychiatric/Behavioral: Negative for agitation, behavioral problems, confusion, decreased concentration, dysphoric mood, hallucinations, self-injury, sleep disturbance and suicidal ideas. The patient is not nervous/anxious and is not hyperactive.         Objective      Physical Exam  /63   Pulse 74   Ht 154.9 cm (60.98\")   Wt 82.5 kg (181 lb 12.8 oz)   BMI 34.37 kg/m²     Body mass index is 34.37 kg/m².    General:   Mental Status:  Alert   Appearance: Cooperative, in no acute distress   Build and Nutrition: Overweight by BMI female   Orientation: Alert and oriented to person, place and time   Posture: Normal   Gait: Mildly antalgic on the left.    Integument       • Left knee: No skin lesions, rash, or ecchymosis.    Neurologic  • Sensation:       • Left foot:  Intact to light touch on the dorsal and plantar aspects    Motor       • Left lower extremity: 5/5 quadriceps, hamstrings, ankle dorsiflexors, and ankle plantar flexors     Vascular       • Left lower extremity: 2+ dorsalis pedis pulse. Prompt capillary refill.    Lower Extremities  • Left Knee:        • Tenderness: No medial or lateral joint line tenderness.       • Swelling: None        • Effusion: Trace       • Crepitus: Positive       • Atrophy: None       • Range of motion:             • Extension: 0°             • Flexion: 125°        • Instability: No varus or valgus laxity. Negative anterior drawer.       • Deformities: None    Imaging/Studies      Imaging Results (Last 24 Hours)     Procedure Component Value Units Date/Time    XR Knee 4+ View Left " [[id number]] Resulted: 05/03/21 1341     Updated: 05/03/21 1342    Narrative:      Left Knee Radiographs  Indication: left knee pain  Views: Standing AP's and skiers of both knees, with lateral and sunrise   views of the left knee    Comparison: no prior studies available    Findings:   Medial joint space narrowing, small calcification off the medial femoral   condyle, no acute bony abnormalities.  No unusual bony features.  Vascular   calcification posteriorly.  Patellofemoral degeneration.          Assessment and Plan     Diagnoses and all orders for this visit:    1. Primary osteoarthritis of left knee (Primary)  -     XR Knee 4+ View Left  -     Ambulatory Referral to Physical Therapy Evaluate and treat        1. Primary osteoarthritis of left knee        I discussed the patient's radiographs and clinical findings with her today. She does have arthritis in her left knee; however, she is not bone-on-bone. We discussed the indications for obtaining cortisone injections; however, she would like to defer an injection at this time as her pain is not constant. I have recommended physical therapy, which she is agreeable too.     Return in about 6 weeks (around 6/14/2021).      Scribed for Juan A Isaacs MD by LIZZETTE ANTHONY.  05/03/21   16:14 EDT    I have personally performed the services described in this document as scribed by the above individual, and it is both accurate and complete.  Juan A Isaacs MD  5/3/2021  17:31 EDT

## 2021-08-16 ENCOUNTER — OFFICE VISIT (OUTPATIENT)
Dept: ORTHOPEDIC SURGERY | Facility: CLINIC | Age: 86
End: 2021-08-16

## 2021-08-16 VITALS
DIASTOLIC BLOOD PRESSURE: 56 MMHG | WEIGHT: 183.4 LBS | SYSTOLIC BLOOD PRESSURE: 166 MMHG | HEART RATE: 68 BPM | HEIGHT: 61 IN | BODY MASS INDEX: 34.63 KG/M2

## 2021-08-16 DIAGNOSIS — M17.12 PRIMARY OSTEOARTHRITIS OF LEFT KNEE: Primary | ICD-10-CM

## 2021-08-16 PROCEDURE — 20610 DRAIN/INJ JOINT/BURSA W/O US: CPT | Performed by: ORTHOPAEDIC SURGERY

## 2021-08-16 PROCEDURE — 99213 OFFICE O/P EST LOW 20 MIN: CPT | Performed by: ORTHOPAEDIC SURGERY

## 2021-08-16 RX ADMIN — ROPIVACAINE HYDROCHLORIDE 4 ML: 5 INJECTION, SOLUTION EPIDURAL; INFILTRATION; PERINEURAL at 13:41

## 2021-08-16 RX ADMIN — TRIAMCINOLONE ACETONIDE 40 MG: 40 INJECTION, SUSPENSION INTRA-ARTICULAR; INTRAMUSCULAR at 13:41

## 2021-08-16 NOTE — PROGRESS NOTES
[facility] [place] [facility] [place] [facility]    Subjective     Chief Complaint   Patient presents with   • Follow-up     3 months follow up for primary osteoarthritis of left knee        HPI    Beth Fregoso is a 85 y.o. female who follows up for her left knee, which has been bothering her for several months. I last saw her on 05/03/2021, at which time she deferred an injection and wanted to try physical therapy.    Today, the patient states that physical therapy did not help her left knee. She has continued with home exercises and reports that this does help her hip and legs some, but she has had no improvement in her knee. She would like to try an injection today. She has pain with walking and climbing stairs. The pain is worse when using the restroom and getting up and down off of the commode.    I have reviewed the following portions of the patient's history and agree with: History of Present Illness and Review of Systems    There is no problem list on file for this patient.    Past Medical History:   Diagnosis Date   • Breast cancer (CMS/[facility]) 2014    Left    • Hx of radiation therapy 2014   • Hypertension       Past Surgical History:   Procedure Laterality Date   • BREAST BIOPSY Left 2014   • BREAST LUMPECTOMY Left 2014   • REDUCTION MAMMAPLASTY        Family History   Problem Relation Age of Onset   • Breast cancer Neg Hx    • Ovarian cancer Neg Hx      Social History     Socioeconomic History   • Marital status:      Spouse name: Not on file   • Number of children: Not on file   • Years of education: Not on file   • Highest education level: Not on file   Tobacco Use   • Smoking status: Never Smoker   • Smokeless tobacco: Never Used   Substance and Sexual Activity   • Alcohol use: Yes     Comment: SOCIAL   • Drug use: No   • Sexual activity: Defer      Current Outpatient Medications on File Prior to Visit   Medication Sig Dispense Refill   • albuterol sulfate  (90 Base) MCG/ACT inhaler      •  "ALPRAZolam (XANAX) 0.5 MG tablet   0   • amLODIPine (NORVASC) 10 MG tablet Take 10 mg by mouth Daily.     • amLODIPine (NORVASC) 5 MG tablet      • aspirin 81 MG chewable tablet Chew 81 mg Daily.     • CALCIUM PO Take  by mouth.     • Cholecalciferol (VITAMIN D PO) Take  by mouth.     • doxazosin (CARDURA) 4 MG tablet      • FOLIC ACID PO Take  by mouth.     • lisinopril-hydrochlorothiazide (PRINZIDE,ZESTORETIC) 20-12.5 MG per tablet Take  by mouth.     • Multiple Vitamin (MULTI VITAMIN PO) Take  by mouth.     • olmesartan (BENICAR) 40 MG tablet      • propranolol (INDERAL) 20 MG tablet   3     No current facility-administered medications on file prior to visit.      No Known Allergies     Review of Systems   Constitutional: Negative.    HENT: Negative.    Eyes: Negative.    Respiratory: Negative.    Cardiovascular: Negative.    Gastrointestinal: Negative.    Endocrine: Negative.    Genitourinary: Negative.    Musculoskeletal: Positive for arthralgias.   Skin: Negative.    Allergic/Immunologic: Negative.    Neurological: Negative.    Hematological: Negative.    Psychiatric/Behavioral: Negative.         Objective      Physical Exam  /56   Pulse 68   Ht 154.9 cm (60.98\")   Wt 83.2 kg (183 lb 6.4 oz)   BMI 34.67 kg/m²     Body mass index is 34.67 kg/m².    General:   Mental Status:  Alert   Appearance: Cooperative, in no acute distress   Build and Nutrition: Overweight by BMI female   Orientation: Alert and oriented to person, place and time   Posture: Normal   Gait: Mildly antalgic on the left      Integument  • Left knee: No skin lesions, rash, or ecchymosis.    Lower Extremities  • Left Knee:  • Tenderness: No medial or lateral joint line tenderness.  • Swelling: None  • Effusion: None  • Crepitus: Positive  • Atrophy: None  • Range of motion:  • Extension: 0°  • Flexion: 130°  • Instability: No varus or valgus laxity. Negative anterior drawer.  • Deformities: None    Imaging/Studies  Imaging Results (Last " 24 Hours)     ** No results found for the last 24 hours. **            Assessment and Plan     Diagnoses and all orders for this visit:    1. Primary osteoarthritis of left knee (Primary)  -     Large Joint Arthrocentesis: L knee        1. Primary osteoarthritis of left knee        The patient tried physical therapy with no relief of her knee pain, so I would recommend a trial of a left knee steroid injection. She agreed to proceed with this, and the injection was performed in the office today. I advised her to continue with her home exercise program for range of motion and gentle strengthening.    Procedure Note:  The potential benefits of performing a therapeutic left knee joint injection, as well as potential risks (including, but not limited to infection, swelling, pain, bleeding, bruising, nerve/blood vessel damage, skin color changes, transient elevation in blood glucose levels, and fat atrophy) were discussed with the patient. After informed consent was obtained, a timeout procedure was performed, and the skin on the left knee was prepped with chlorhexidine soap and alcohol, after which ethyl chloride was applied to the skin at the injection site. Via the anterolateral approach, 1 ml of Kenalog 40 mg/ml mixed with 4 ml 0.5% ropivacaine plain was injected into the knee joint. The patient tolerated the procedure well, experiencing 50% improvement a few minutes following the injection. There were no complications. A Band-Aid was applied to the injection site. Post-procedural instructions were given to the patient and/or their caregiver.      Return in about 3 months (around 11/16/2021).      Transcribed from ambient dictation for Juan A Isaacs MD by Elsie Anderson.  08/16/21   15:19 EDT    I have personally performed the services described in this document as transcribed by the above individual, and it is both accurate and complete.  Juan A Isaacs MD  8/17/2021  08:50 EDT

## 2021-08-16 NOTE — PROGRESS NOTES
Procedure   Large Joint Arthrocentesis: L knee  Date/Time: 8/16/2021 1:41 PM  Consent given by: patient  Site marked: site marked  Timeout: Immediately prior to procedure a time out was called to verify the correct patient, procedure, equipment, support staff and site/side marked as required   Supporting Documentation  Indications: pain   Procedure Details  Location: knee - L knee  Preparation: Patient was prepped and draped in the usual sterile fashion  Needle size: 23 G  Approach: anterolateral  Medications administered: 4 mL ropivacaine 0.5 %; 40 mg triamcinolone acetonide 40 MG/ML  Patient tolerance: patient tolerated the procedure well with no immediate complications

## 2021-08-17 RX ORDER — TRIAMCINOLONE ACETONIDE 40 MG/ML
40 INJECTION, SUSPENSION INTRA-ARTICULAR; INTRAMUSCULAR
Status: COMPLETED | OUTPATIENT
Start: 2021-08-16 | End: 2021-08-16

## 2021-08-17 RX ORDER — ROPIVACAINE HYDROCHLORIDE 5 MG/ML
4 INJECTION, SOLUTION EPIDURAL; INFILTRATION; PERINEURAL
Status: COMPLETED | OUTPATIENT
Start: 2021-08-16 | End: 2021-08-16

## 2021-08-18 ENCOUNTER — TRANSCRIBE ORDERS (OUTPATIENT)
Dept: ADMINISTRATIVE | Facility: HOSPITAL | Age: 86
End: 2021-08-18

## 2021-08-18 DIAGNOSIS — Z12.31 VISIT FOR SCREENING MAMMOGRAM: Primary | ICD-10-CM

## 2021-09-21 ENCOUNTER — HOSPITAL ENCOUNTER (OUTPATIENT)
Dept: MAMMOGRAPHY | Facility: HOSPITAL | Age: 86
Discharge: HOME | End: 2021-09-21
Admitting: RADIOLOGY

## 2021-09-21 DIAGNOSIS — Z12.31 VISIT FOR SCREENING MAMMOGRAM: ICD-10-CM

## 2021-09-21 PROCEDURE — 77067 SCR MAMMO BI INCL CAD: CPT

## 2021-09-21 PROCEDURE — 77063 BREAST TOMOSYNTHESIS BI: CPT | Performed by: RADIOLOGY

## 2021-09-21 PROCEDURE — 77063 BREAST TOMOSYNTHESIS BI: CPT

## 2021-09-21 PROCEDURE — 77067 SCR MAMMO BI INCL CAD: CPT | Performed by: RADIOLOGY

## 2021-11-03 ENCOUNTER — TELEPHONE (OUTPATIENT)
Dept: ORTHOPEDIC SURGERY | Facility: CLINIC | Age: 86
End: 2021-11-03

## 2021-11-03 NOTE — TELEPHONE ENCOUNTER
Pt called in to state that she is experiencing a lot of pain in her left knee causing her to have trouble walking. Appt scheduled for 11-15. Last injection was on 8-16. Would like a call back with clinical advice please

## 2021-11-03 NOTE — TELEPHONE ENCOUNTER
I spoke with the patient and she advised me that the left knee pain has gotten extremely bad since yesterday. She is now using a walker to get around and she is unable to walk without it. She mentioned that she has been taking Prednisone and took the last one yesterday and did not know if this would effect her knee pain. I advised that it should not have. I asked what kind of medication she has been taking and she has only been taking Tylenol Extra Strength and using heat on the knee. I advised that she is able to alternate with Tylenol and Ibuprofen to help alleviate her pain. I also let her know that she should try icing versus heat and to elevate higher than her heart to help with the pain. I explained that she needed to do this for the next 24-48 hours. She understood and verified her upcoming appointment with Dr. Isaacs on 11/15.    Pam CASEY

## 2021-11-15 ENCOUNTER — OFFICE VISIT (OUTPATIENT)
Dept: ORTHOPEDIC SURGERY | Facility: CLINIC | Age: 86
End: 2021-11-15

## 2021-11-15 VITALS
HEART RATE: 70 BPM | DIASTOLIC BLOOD PRESSURE: 67 MMHG | WEIGHT: 183 LBS | SYSTOLIC BLOOD PRESSURE: 153 MMHG | BODY MASS INDEX: 34.55 KG/M2 | HEIGHT: 61 IN

## 2021-11-15 DIAGNOSIS — M17.12 PRIMARY OSTEOARTHRITIS OF LEFT KNEE: Primary | ICD-10-CM

## 2021-11-15 PROCEDURE — 99213 OFFICE O/P EST LOW 20 MIN: CPT | Performed by: ORTHOPAEDIC SURGERY

## 2021-11-15 PROCEDURE — 20610 DRAIN/INJ JOINT/BURSA W/O US: CPT | Performed by: ORTHOPAEDIC SURGERY

## 2021-11-15 RX ORDER — HYDROCHLOROTHIAZIDE 25 MG/1
TABLET ORAL
COMMUNITY
Start: 2021-11-05

## 2021-11-15 RX ORDER — TRIAMCINOLONE ACETONIDE 40 MG/ML
40 INJECTION, SUSPENSION INTRA-ARTICULAR; INTRAMUSCULAR
Status: COMPLETED | OUTPATIENT
Start: 2021-11-15 | End: 2021-11-15

## 2021-11-15 RX ORDER — MULTIPLE VITAMINS W/ MINERALS TAB 9MG-400MCG
TAB ORAL SEE ADMIN INSTRUCTIONS
COMMUNITY

## 2021-11-15 RX ORDER — ROPIVACAINE HYDROCHLORIDE 5 MG/ML
4 INJECTION, SOLUTION EPIDURAL; INFILTRATION; PERINEURAL
Status: COMPLETED | OUTPATIENT
Start: 2021-11-15 | End: 2021-11-15

## 2021-11-15 RX ADMIN — ROPIVACAINE HYDROCHLORIDE 4 ML: 5 INJECTION, SOLUTION EPIDURAL; INFILTRATION; PERINEURAL at 09:58

## 2021-11-15 RX ADMIN — TRIAMCINOLONE ACETONIDE 40 MG: 40 INJECTION, SUSPENSION INTRA-ARTICULAR; INTRAMUSCULAR at 09:58

## 2021-11-15 NOTE — PROGRESS NOTES
Procedure   Large Joint Arthrocentesis: L knee  Date/Time: 11/15/2021 9:58 AM  Consent given by: patient  Site marked: site marked  Timeout: Immediately prior to procedure a time out was called to verify the correct patient, procedure, equipment, support staff and site/side marked as required   Supporting Documentation  Indications: pain   Procedure Details  Location: knee - L knee  Preparation: Patient was prepped and draped in the usual sterile fashion  Needle size: 22 G  Approach: anterolateral  Medications administered: 40 mg triamcinolone acetonide 40 MG/ML; 4 mL ropivacaine 0.5 %  Patient tolerance: patient tolerated the procedure well with no immediate complications

## 2021-11-15 NOTE — PROGRESS NOTES
[facility] Note    Subjective     Chief Complaint   Patient presents with   • Follow-up     3 monthf/u Primary osteoarthritis of left knee, last cortisone injection 8/16/21        HPI    It has been 3  month(s) since Ms. Fregoso's last visit. She returns to clinic today for follow-up of left knee pain. The issue has been ongoing for 2 week(s). She rates her pain a 7/10 on the pain scale. Previous/current treatments: cane/walker, NSAIDS and steroid injection (last injection 8/16/21 was helpful). Current symptoms: pain, popping and giving way/buckling. The pain is worse with rising from seated position; heat, assistive device (cane/walker) and pain medication and/or NSAID improve the pain. Overall, she is doing worse.  She is not interested in knee replacement surgery.  She would like to have an injection today.  Previous injection provided relief up until about 2 weeks ago.    I have reviewed the following portions of the patient's history and agree with: History of Present Illness and Review of Systems    There is no problem list on file for this patient.    Past Medical History:   Diagnosis Date   • Breast cancer (HCC) 2014    Left    • Hx of radiation therapy 2014   • Hypertension       Past Surgical History:   Procedure Laterality Date   • BREAST BIOPSY Left 2014   • BREAST LUMPECTOMY Left 2014   • REDUCTION MAMMAPLASTY        Family History   Problem Relation Age of Onset   • Breast cancer Neg Hx    • Ovarian cancer Neg Hx      Social History     Socioeconomic History   • Marital status:    Tobacco Use   • Smoking status: Never Smoker   • Smokeless tobacco: Never Used   Substance and Sexual Activity   • Alcohol use: Yes     Comment: SOCIAL   • Drug use: No   • Sexual activity: Defer      Current Outpatient Medications on File Prior to Visit   Medication Sig Dispense Refill   • albuterol sulfate  (90 Base) MCG/ACT inhaler      • ALPRAZolam (XANAX) 0.5 MG tablet   0   •  "amLODIPine (NORVASC) 5 MG tablet      • aspirin 81 MG chewable tablet Chew 81 mg Daily.     • CALCIUM PO Take  by mouth.     • Cholecalciferol (VITAMIN D PO) Take  by mouth.     • doxazosin (CARDURA) 4 MG tablet      • FOLIC ACID PO Take  by mouth.     • hydroCHLOROthiazide (HYDRODIURIL) 25 MG tablet      • Multiple Vitamin (MULTI VITAMIN PO) Take  by mouth.     • multivitamin with minerals (CENTRUM SILVER 50+WOMEN PO) See Admin Instructions.     • olmesartan (BENICAR) 40 MG tablet      • propranolol (INDERAL) 20 MG tablet   3   • [DISCONTINUED] amLODIPine (NORVASC) 10 MG tablet Take 10 mg by mouth Daily.     • [DISCONTINUED] lisinopril-hydrochlorothiazide (PRINZIDE,ZESTORETIC) 20-12.5 MG per tablet Take  by mouth.       No current facility-administered medications on file prior to visit.      No Known Allergies     Review of Systems   Constitutional: Negative.    HENT: Negative.    Eyes: Negative.    Respiratory: Negative.    Cardiovascular: Negative.    Gastrointestinal: Negative.    Endocrine: Negative.    Genitourinary: Negative.    Musculoskeletal: Positive for arthralgias.   Skin: Negative.    Allergic/Immunologic: Negative.    Neurological: Negative.    Hematological: Negative.    Psychiatric/Behavioral: Negative.         Objective      Physical Exam  /67   Pulse 70   Ht 154.9 cm (60.98\")   Wt 83 kg (183 lb)   BMI 34.60 kg/m²     Body mass index is 34.6 kg/m².    General:   Mental Status:  Alert   Appearance: Cooperative, in no acute distress   Build and Nutrition: Overweight by BMI female   Orientation: Alert and oriented to person, place and time   Posture: Normal   Gait: Antalgic on the left    Integument:   Left knee: No skin lesions, no rash, no ecchymosis    Lower Extremities:   Left Knee:    Tenderness:  Medial joint line tenderness    Effusion:  None    Swelling:  None    Crepitus: Positive    Range of motion:  Extension: 0°       Flexion: 120°  Instability: No varus laxity, no valgus " laxity, negative anterior drawer  Deformities:  None      Imaging/Studies  Imaging Results (Last 24 Hours)     Procedure Component Value Units Date/Time    XR Knee 4+ View Left [[id number]] Resulted: 11/15/21 0936     Updated: 11/15/21 0936    Narrative:      Left Knee Radiographs  Indication: left knee pain  Views: Standing AP's and skiers of both knees, with lateral and sunrise   views of the left knee    Comparison: 5/3/2021    Findings:   Medial joint space narrowing, no acute bony abnormalities.  No unusual   bony features.  Worsening compared to the previous imaging.            Assessment and Plan     Diagnoses and all orders for this visit:    1. Primary osteoarthritis of left knee (Primary)  -     XR Knee 4+ View Left  -     Large Joint Arthrocentesis: L knee        1. Primary osteoarthritis of left knee        Reviewed my findings with the patient.  She does have left knee arthritis.  She is not interested in total knee replacement surgery.  She elected for an injection today, and this was provided.  I will see her back in 4 months, but sooner for any problems.    Procedure Note:  The potential benefits of performing a therapeutic left knee joint injection, as well as potential risks (including, but not limited to infection, swelling, pain, bleeding, bruising, nerve/blood vessel damage, skin color changes, transient elevation in blood glucose levels, and fat atrophy) were discussed with the patient.  After informed consent, timeout procedure was performed, and the skin on the left knee was prepped with chlorhexidine soap and alcohol, after which ethyl chloride was applied to the skin at the injection site. Via the anterolateral approach, 1ml of Kenalog 40mg/ml mixed with 4ml 0.5% ropivacaine plain was injected into the knee joint.  The patient tolerated the procedure well, experiencing 80% improvement a few minutes following the injection. There were no complications.  Band-Aid was applied to the injection  site. Post-procedural instructions were given to the patient and/or their caregiver.      Return in about 4 months (around 3/15/2022).      Juan A Isaacs MD  11/15/21  10:03 EST

## 2022-01-10 ENCOUNTER — OFFICE VISIT (OUTPATIENT)
Dept: ORTHOPEDIC SURGERY | Facility: CLINIC | Age: 87
End: 2022-01-10

## 2022-01-10 VITALS
WEIGHT: 182 LBS | SYSTOLIC BLOOD PRESSURE: 130 MMHG | DIASTOLIC BLOOD PRESSURE: 72 MMHG | BODY MASS INDEX: 34.36 KG/M2 | HEIGHT: 61 IN

## 2022-01-10 DIAGNOSIS — M17.12 PRIMARY OSTEOARTHRITIS OF LEFT KNEE: Primary | ICD-10-CM

## 2022-01-10 PROCEDURE — 99212 OFFICE O/P EST SF 10 MIN: CPT | Performed by: ORTHOPAEDIC SURGERY

## 2022-01-10 RX ORDER — AMOXICILLIN 250 MG/1
250 CAPSULE ORAL 3 TIMES DAILY
COMMUNITY
End: 2022-03-16

## 2022-01-10 NOTE — PROGRESS NOTES
[facility] Note    Subjective     Chief Complaint   Patient presents with   • Follow-up     8 weeks follow up Primary osteoarthritis of left knee        HPI    It has been 8  week(s) since Ms. Fregoso's last visit. She returns to clinic today for follow-up of left knee arthritis. The issue has been ongoing for 6 month(s). She rates her pain a 8/10 on the pain scale. Previous/current treatments: physical therapy. Current symptoms: pain. The pain is worse with walking, standing and rising from seated position; resting, heat and assistive device (cane/walker) improve the pain. Overall, she is doing better.  She was having some pain after the last injection on the medial side of her knee, but that actually improved somewhat since she made this appointment.  She was asking about other strategies to deal with her knee arthritis and pain.    I have reviewed the following portions of the patient's history and agree with: History of Present Illness and Review of Systems    There is no problem list on file for this patient.    Past Medical History:   Diagnosis Date   • Breast cancer (HCC) 2014    Left    • Hx of radiation therapy 2014   • Hypertension       Past Surgical History:   Procedure Laterality Date   • BREAST BIOPSY Left 2014   • BREAST LUMPECTOMY Left 2014   • REDUCTION MAMMAPLASTY        Family History   Problem Relation Age of Onset   • Breast cancer Neg Hx    • Ovarian cancer Neg Hx      Social History     Socioeconomic History   • Marital status:    Tobacco Use   • Smoking status: Never Smoker   • Smokeless tobacco: Never Used   Substance and Sexual Activity   • Alcohol use: Yes     Comment: SOCIAL   • Drug use: No   • Sexual activity: Defer      Current Outpatient Medications on File Prior to Visit   Medication Sig Dispense Refill   • albuterol sulfate  (90 Base) MCG/ACT inhaler      • ALPRAZolam (XANAX) 0.5 MG tablet   0   • amLODIPine (NORVASC) 5 MG tablet      •  "amoxicillin (AMOXIL) 250 MG capsule Take 250 mg by mouth 3 (Three) Times a Day.     • aspirin 81 MG chewable tablet Chew 81 mg Daily.     • CALCIUM PO Take  by mouth.     • Cholecalciferol (VITAMIN D PO) Take  by mouth.     • doxazosin (CARDURA) 4 MG tablet      • FOLIC ACID PO Take  by mouth.     • hydroCHLOROthiazide (HYDRODIURIL) 25 MG tablet      • Multiple Vitamin (MULTI VITAMIN PO) Take  by mouth.     • multivitamin with minerals (CENTRUM SILVER 50+WOMEN PO) See Admin Instructions.     • olmesartan (BENICAR) 40 MG tablet      • propranolol (INDERAL) 20 MG tablet   3     No current facility-administered medications on file prior to visit.      No Known Allergies     Review of Systems   Constitutional: Negative.    HENT: Negative.    Eyes: Negative.    Respiratory: Negative.    Cardiovascular: Negative.    Gastrointestinal: Negative.    Endocrine: Negative.    Genitourinary: Negative.    Musculoskeletal: Positive for arthralgias.   Skin: Negative.    Allergic/Immunologic: Negative.    Neurological: Negative.    Hematological: Negative.    Psychiatric/Behavioral: Negative.         Objective      Physical Exam  /72   Ht 154.9 cm (60.98\")   Wt 82.6 kg (182 lb)   BMI 34.41 kg/m²     Body mass index is 34.41 kg/m².    General:   Mental Status:  Alert   Appearance: Cooperative, in no acute distress   Build and Nutrition: Well-nourished well-developed female   Orientation: Alert and oriented to person, place and time   Posture: Normal   Gait: Nonantalgic    Integument:              Left knee: No skin lesions, no rash, no ecchymosis     Lower Extremities:              Left Knee:                          Tenderness:    Medial joint line tenderness                          Effusion:          Trace                          Swelling:          None                          Crepitus:          Positive                          Range of motion:        Extension:       0°                                                  "             Flexion:           120°  Instability:        No varus laxity, no valgus laxity, negative anterior drawer  Deformities:     None    Imaging/Studies  Imaging Results (Last 24 Hours)     ** No results found for the last 24 hours. **        No new imaging    Assessment and Plan     Diagnoses and all orders for this visit:    1. Primary osteoarthritis of left knee (Primary)        1. Primary osteoarthritis of left knee        I reviewed my findings with the patient.  She may be a candidate for viscosupplementation injections, we briefly discussed that today.  She is going to consider that option, but would like to see how her knee does over the next couple of months.  She will let me know if she would like to proceed prior to her next visit.  She is not interested in surgical intervention.    Return in about 2 months (around 3/10/2022) for At regularly scheduled appointment.      Juan A Isaacs MD  01/10/22  17:36 EST

## 2022-02-09 ENCOUNTER — TELEPHONE (OUTPATIENT)
Dept: ORTHOPEDIC SURGERY | Facility: CLINIC | Age: 87
End: 2022-02-09

## 2022-02-09 DIAGNOSIS — M17.12 PRIMARY OSTEOARTHRITIS OF LEFT KNEE: Primary | ICD-10-CM

## 2022-02-09 NOTE — TELEPHONE ENCOUNTER
Provider: CAMILO  Caller: BELINDA LLAMAS  Relationship to Patient: PATIENT  Pharmacy: N/A  Phone Number: [phone]  Reason for Call: PATIENT CALLING TO SCHEDULE GEL INJ LT KNEE  When was the patient last seen: 1.10.22

## 2022-03-16 ENCOUNTER — CLINICAL SUPPORT (OUTPATIENT)
Dept: ORTHOPEDIC SURGERY | Facility: CLINIC | Age: 87
End: 2022-03-16

## 2022-03-16 DIAGNOSIS — M17.12 PRIMARY OSTEOARTHRITIS OF LEFT KNEE: Primary | ICD-10-CM

## 2022-03-16 PROCEDURE — 20610 DRAIN/INJ JOINT/BURSA W/O US: CPT | Performed by: PHYSICIAN ASSISTANT

## 2022-03-16 RX ORDER — MECLIZINE HYDROCHLORIDE 25 MG/1
TABLET ORAL
COMMUNITY
Start: 2022-03-02

## 2022-03-16 NOTE — PROGRESS NOTES
Procedure   - Large Joint Arthrocentesis: L knee on 3/16/2022 12:52 PM  Indications: pain  Details: 22 G needle, anterolateral approach  Medications: 30 mg Hyaluronan 30 MG/2ML  Procedure, treatment alternatives, risks and benefits explained, specific risks discussed. Consent was given by the patient. Immediately prior to procedure a time out was called to verify the correct patient, procedure, equipment, support staff and site/side marked as required. Patient was prepped and draped in the usual sterile fashion.

## 2022-03-16 NOTE — PROGRESS NOTES
Patient presents for the 1st injection of Orthovisc in the left knee.  This is her first series of visco    Using sterile technique, the left knee was sterilely prepped with Hibiclens.  Following time out, using a 22 gauge needle the left knee was aspirated and then injected with 2 ml Orthovisc. Approximately 0.5 mm of straw-colored fluid was obtained.  Patient tolerated the procedure well.  No complications.      rtc 1 week

## 2022-03-23 ENCOUNTER — CLINICAL SUPPORT (OUTPATIENT)
Dept: ORTHOPEDIC SURGERY | Facility: CLINIC | Age: 87
End: 2022-03-23

## 2022-03-23 DIAGNOSIS — M17.12 PRIMARY OSTEOARTHRITIS OF LEFT KNEE: Primary | ICD-10-CM

## 2022-03-23 PROCEDURE — 20610 DRAIN/INJ JOINT/BURSA W/O US: CPT | Performed by: PHYSICIAN ASSISTANT

## 2022-03-23 NOTE — PROGRESS NOTES
Procedure   - Large Joint Arthrocentesis: L knee on 3/23/2022 1:01 PM  Indications: pain  Details: 22 G needle, anterolateral approach  Medications: 30 mg Hyaluronan 30 MG/2ML  Outcome: tolerated well, no immediate complications  Consent was given by the patient. Immediately prior to procedure a time out was called to verify the correct patient, procedure, equipment, support staff and site/side marked as required. Patient was prepped and draped in the usual sterile fashion.

## 2022-03-27 NOTE — PROGRESS NOTES
2 presents for the second injection of Orthovisc in the left knee.    Using sterile technique, the left knee was sterilely prepped with Hibiclens.  Following time out, using a 22 gauge needle the left knee was aspirated and then injected with 2 ml Orthovisc. Approximately 0.5 mm of straw-colored fluid was obtained.  Patient tolerated the procedure well.  No complications.      rtc 1 week

## 2022-03-30 ENCOUNTER — CLINICAL SUPPORT (OUTPATIENT)
Dept: ORTHOPEDIC SURGERY | Facility: CLINIC | Age: 87
End: 2022-03-30

## 2022-03-30 DIAGNOSIS — M17.12 PRIMARY OSTEOARTHRITIS OF LEFT KNEE: Primary | ICD-10-CM

## 2022-03-30 PROCEDURE — 20610 DRAIN/INJ JOINT/BURSA W/O US: CPT | Performed by: PHYSICIAN ASSISTANT

## 2022-04-04 NOTE — PROGRESS NOTES
Patient presents for the final injection of ORthovisc in the left knee.    Using sterile technique, the left knee was sterilely prepped with Hibiclens.  Following time out, using a 22 gauge needle the left knee was aspirated and then injected with 2 ml Orthovisc. Approximately 0.5 mm of straw-colored fluid was obtained.  Patient tolerated the procedure well.  No complications.      RTC prn.

## 2022-06-07 ENCOUNTER — HOSPITAL ENCOUNTER (EMERGENCY)
Facility: HOSPITAL | Age: 87
Discharge: HOME | End: 2022-06-07
Attending: EMERGENCY MEDICINE | Admitting: EMERGENCY MEDICINE

## 2022-06-07 VITALS
TEMPERATURE: 98.3 F | HEIGHT: 62 IN | BODY MASS INDEX: 33.13 KG/M2 | RESPIRATION RATE: 15 BRPM | OXYGEN SATURATION: 93 % | HEART RATE: 74 BPM | DIASTOLIC BLOOD PRESSURE: 59 MMHG | WEIGHT: 180 LBS | SYSTOLIC BLOOD PRESSURE: 168 MMHG
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
## 2022-06-07 DIAGNOSIS — S81.811A LACERATION OF RIGHT LOWER EXTREMITY, INITIAL ENCOUNTER: Primary | ICD-10-CM

## 2022-06-07 PROCEDURE — 90471 IMMUNIZATION ADMIN: CPT

## 2022-06-07 PROCEDURE — 99283 EMERGENCY DEPT VISIT LOW MDM: CPT

## 2022-06-07 PROCEDURE — 25010000002 TETANUS-DIPHTHERIA TOXOIDS (ADULT) PER 0.5 ML: Performed by: EMERGENCY MEDICINE

## 2022-06-07 PROCEDURE — 90714 TD VACC NO PRESV 7 YRS+ IM: CPT | Performed by: EMERGENCY MEDICINE

## 2022-06-07 RX ORDER — LIDOCAINE HYDROCHLORIDE AND EPINEPHRINE 10; 10 MG/ML; UG/ML
10 INJECTION, SOLUTION INFILTRATION; PERINEURAL ONCE
Status: COMPLETED | OUTPATIENT
Start: 2022-06-07 | End: 2022-06-07

## 2022-06-07 RX ADMIN — CLOSTRIDIUM TETANI TOXOID ANTIGEN (FORMALDEHYDE INACTIVATED) AND CORYNEBACTERIUM DIPHTHERIAE TOXOID ANTIGEN (FORMALDEHYDE INACTIVATED) 0.5 ML: 5; 2 INJECTION, SUSPENSION INTRAMUSCULAR at 06:53

## 2022-06-07 RX ADMIN — LIDOCAINE HYDROCHLORIDE,EPINEPHRINE BITARTRATE 10 ML: 10; .01 INJECTION, SOLUTION INFILTRATION; PERINEURAL at 08:28

## 2022-06-07 NOTE — ED PROVIDER NOTES
Subjective   Mrs. Fregoso presents with a laceration on her right shin.  She reports that she was having a vivid dream and it caused her to roll out of bed striking her walker with her right lower leg.  Her daughter tells me there was a large amount of blood.  She denies pain with weightbearing or injury anywhere else.  She does not know when her last tetanus shot was.      History provided by:  Patient  Laceration  Location:  Leg  Leg laceration location:  R leg  Length:  For  Depth:  Through underlying tissue  Quality: avulsion    Bleeding: controlled    Laceration mechanism:  Fall  Pain details:     Quality:  Dull  Relieved by:  Nothing  Worsened by:  Nothing  Tetanus status:  Out of date      Review of Systems    Past Medical History:   Diagnosis Date   • Breast cancer (HCC) 2014    Left    • Hx of radiation therapy 2014   • Hypertension        No Known Allergies    Past Surgical History:   Procedure Laterality Date   • BREAST BIOPSY Left 2014   • BREAST LUMPECTOMY Left 2014   • REDUCTION MAMMAPLASTY         Family History   Problem Relation Age of Onset   • Breast cancer Neg Hx    • Ovarian cancer Neg Hx        Social History     Socioeconomic History   • Marital status:    Tobacco Use   • Smoking status: Never Smoker   • Smokeless tobacco: Never Used   Substance and Sexual Activity   • Alcohol use: Yes     Comment: SOCIAL   • Drug use: No   • Sexual activity: Defer           Objective   Physical Exam  Vitals reviewed.   Constitutional:       General: She is not in acute distress.     Appearance: Normal appearance.   HENT:      Head: Normocephalic and atraumatic.   Musculoskeletal:      Right lower leg: No edema.      Left lower leg: No edema.   Skin:     General: Skin is warm and dry.      Capillary Refill: Capillary refill takes less than 2 seconds.      Comments: Inspection of the right mid anterior shin reveals a proximal pointing flap.  The margins of it are not full-thickness however the center of it  does fully penetrate the dermis with subcu fat visible.  The edges of the flap are dusky but the center is pink and viable.  The base of the flap is 2 cm across.   Neurological:      General: No focal deficit present.      Mental Status: She is alert and oriented to person, place, and time.   Psychiatric:         Mood and Affect: Mood normal.         Behavior: Behavior normal.         Thought Content: Thought content normal.         Laceration Repair    Date/Time: 6/7/2022 9:46 AM  Performed by: William Barbosa MD  Authorized by: William Barbosa MD     Consent:     Consent obtained:  Verbal    Consent given by:  Patient  Anesthesia:     Anesthesia method:  Topical application and local infiltration    Local anesthetic:  Lidocaine 1% WITH epi  Laceration details:     Location:  Leg    Leg location:  R lower leg    Length (cm):  4  Pre-procedure details:     Preparation:  Patient was prepped and draped in usual sterile fashion  Exploration:     Hemostasis achieved with:  Epinephrine    Contaminated: no    Treatment:     Area cleansed with:  Saline    Irrigation solution:  Sterile saline    Irrigation method:  Syringe    Debridement:  Minimal  Skin repair:     Repair method:  Sutures    Suture size:  4-0    Suture material:  Nylon    Number of sutures:  8  Approximation:     Approximation:  Close  Repair type:     Repair type:  Simple  Post-procedure details:     Dressing:  Non-adherent dressing    Procedure completion:  Tolerated well, no immediate complications               ED Course  ED Course as of 06/07/22 0948   Tue Jun 07, 2022   0801 I closed the flap with 8 sutures and numerous Steri-Strips [DT]      ED Course User Index  [DT] William Barbosa MD                                                 [facility]    Final diagnoses:   Fall in home, initial encounter   Laceration of right lower extremity, initial encounter       ED Disposition  ED Disposition     ED Disposition   Discharge    Condition   Stable    Comment    Pt ambulated independently on d/c with daughter. No complaints at this time              Tripp Linder MD  [address] [place] [address] [address] HERNANDEZ  [facility] [id number] [phone]               Medication List      No changes were made to your prescriptions during this visit.          William Barbosa MD  06/07/22 [id number]

## 2022-06-07 NOTE — DISCHARGE INSTRUCTIONS
After 24 hours you may begin gently washing this with antibacterial soap.  Pat it dry.  Return in 10 days to have the sutures removed.  Return sooner if it becomes red, starts draining, or if you have any other concerns about it.  After 24 hours you may begin placing Neosporin over the wound as well.

## 2022-06-19 ENCOUNTER — HOSPITAL ENCOUNTER (EMERGENCY)
Facility: HOSPITAL | Age: 87
Discharge: HOME | End: 2022-06-19
Attending: EMERGENCY MEDICINE | Admitting: EMERGENCY MEDICINE

## 2022-06-19 VITALS
OXYGEN SATURATION: 96 % | HEART RATE: 75 BPM | SYSTOLIC BLOOD PRESSURE: 178 MMHG | WEIGHT: 179.9 LBS | DIASTOLIC BLOOD PRESSURE: 64 MMHG | RESPIRATION RATE: 16 BRPM | HEIGHT: 62 IN | TEMPERATURE: 97.4 F | BODY MASS INDEX: 33.1 KG/M2

## 2022-06-19 DIAGNOSIS — L03.115 CELLULITIS OF RIGHT LOWER LEG: Primary | ICD-10-CM

## 2022-06-19 PROCEDURE — 99283 EMERGENCY DEPT VISIT LOW MDM: CPT

## 2022-06-19 PROCEDURE — 99202 OFFICE O/P NEW SF 15 MIN: CPT

## 2022-06-19 RX ORDER — DOXYCYCLINE 100 MG/1
100 CAPSULE ORAL ONCE
Status: COMPLETED | OUTPATIENT
Start: 2022-06-19 | End: 2022-06-19

## 2022-06-19 RX ORDER — DOXYCYCLINE 100 MG/1
100 CAPSULE ORAL 2 TIMES DAILY
Qty: 14 CAPSULE | Refills: 0 | Status: SHIPPED | OUTPATIENT
Start: 2022-06-19

## 2022-06-19 RX ORDER — CEPHALEXIN 250 MG/1
500 CAPSULE ORAL ONCE
Status: COMPLETED | OUTPATIENT
Start: 2022-06-19 | End: 2022-06-19

## 2022-06-19 RX ORDER — CEPHALEXIN 500 MG/1
500 CAPSULE ORAL 3 TIMES DAILY
Qty: 21 CAPSULE | Refills: 0 | Status: SHIPPED | OUTPATIENT
Start: 2022-06-19

## 2022-06-19 RX ADMIN — CEPHALEXIN 500 MG: 250 CAPSULE ORAL at 15:29

## 2022-06-19 RX ADMIN — DOXYCYCLINE 100 MG: 100 CAPSULE ORAL at 15:29

## 2022-06-19 NOTE — ED PROVIDER NOTES
"Subjective   86-year-old female presents for evaluation of \"wound check.\"  Of note, the patient was seen here in the emergency department 12 days ago at which time she had a laceration to her anterior right lower leg that was sutured.  She has been compliant with wound care management at home and has been using Neosporin and keeping the wound clean.  She presents today requesting that her sutures be removed.  However, she also notes that she is experiencing some mild pain and redness around the suture site.  No fevers or systemic symptoms.  She states that there has been some scant clear drainage from the site but no purulence.          Review of Systems   Constitutional: Negative for chills and fever.   Respiratory: Negative for cough.    Skin:        Redness to sutured wound of right lower leg   All other systems reviewed and are negative.      Past Medical History:   Diagnosis Date   • Breast cancer (HCC) 2014    Left    • Hx of radiation therapy 2014   • Hypertension        No Known Allergies    Past Surgical History:   Procedure Laterality Date   • BREAST BIOPSY Left 2014   • BREAST LUMPECTOMY Left 2014   • REDUCTION MAMMAPLASTY         Family History   Problem Relation Age of Onset   • Breast cancer Neg Hx    • Ovarian cancer Neg Hx        Social History     Socioeconomic History   • Marital status:    Tobacco Use   • Smoking status: Never Smoker   • Smokeless tobacco: Never Used   Substance and Sexual Activity   • Alcohol use: Yes     Comment: SOCIAL   • Drug use: No   • Sexual activity: Defer           Objective   Physical Exam  Vitals and nursing note reviewed.   Constitutional:       General: She is not in acute distress.     Appearance: She is well-developed. She is not diaphoretic.      Comments: Nontoxic-appearing female   HENT:      Head: Normocephalic and atraumatic.      Comments: No mucous membrane lesions present  Cardiovascular:      Rate and Rhythm: Normal rate and regular rhythm.      " Heart sounds: Normal heart sounds. No murmur heard.    No friction rub. No gallop.   Pulmonary:      Effort: Pulmonary effort is normal. No respiratory distress.      Breath sounds: Normal breath sounds. No wheezing or rales.   Musculoskeletal:         General: Normal range of motion.      Cervical back: Neck supple.   Skin:     Comments: Upside down V-shaped laceration noted to the anterior right lower leg, sutures are in place, mild erythema noted surrounding the wound with no fluctuance, induration, purulence, or pain out of proportion to exam, no warmth present   Neurological:      Mental Status: She is alert and oriented to person, place, and time.      Comments: Right lower extremity is neurovascularly intact distally with bounding distal pulses and normal sensation, normal gait   Psychiatric:         Mood and Affect: Mood normal.         Thought Content: Thought content normal.         Judgment: Judgment normal.         Procedures           ED Course  ED Course as of 06/19/22 1445   Sun Jun 19, 2022   1488 86-year-old female presents for evaluation of wound check.  Of note, the patient had a wound sutured to her right lower leg here 12 days ago.  She states that the wound has been healing well.  She presents today requesting that her sutures be removed.  However, she also notes that there is some redness and tenderness around the suture site.  She denies any fevers or systemic symptoms.  On arrival to the ED, the patient is nontoxic-appearing.  Vital signs are reassuring.  SIRS negative. [DD]   1439 On exam, she has mild erythema surrounding her suture site with no fluctuance or induration present.  No palpable warmth.  No crepitus.  No pain out of proportion to exam.  No purulence noted. [DD]   1439 I outlined the patient's erythema with a marking pen, and we will hold off on taking her sutures out today.  She has an upcoming appointment with her primary care physician scheduled for Thursday, and we will  "keep the sutures in until that time.  Scripts for doxycycline and Keflex.  First dose given here in the emergency department. [DD]   1440   She will follow-up as directed.  Agreeable with plan and given appropriate strict return precautions, especially those regarding fevers and systemic symptoms as well as worsening cellulitis. [DD]      ED Course User Index  [DD] Paul Harrison MD                                         No results found for this or any previous visit (from the past 24 hour(s)).  Note: In addition to lab results from this visit, the labs listed above may include labs taken at another facility or during a different encounter within the last 24 hours. Please correlate lab times with ED admission and discharge times for further clarification of the services performed during this visit.    No orders to display     Vitals:    06/19/22 1351   BP: 178/64   Patient Position: Sitting   Pulse: 75   Resp: 16   Temp: 97.4 °F (36.3 °C)   TempSrc: Oral   SpO2: 96%   Weight: 81.6 kg (179 lb 14.3 oz)   Height: 157.5 cm (62\")     Medications   doxycycline (MONODOX) capsule 100 mg (has no administration in time range)   cephalexin (KEFLEX) capsule 500 mg (has no administration in time range)     ECG/EMG Results (last 24 hours)     ** No results found for the last 24 hours. **        No orders to display               MDM    Final diagnoses:   Cellulitis of right lower leg       ED Disposition  ED Disposition     ED Disposition   Discharge    Condition   Stable    Comment   --             Tripp Linder MD  [address] [place] [address] [address] [place] [address]  [phone]    In 4 days           Medication List      New Prescriptions    cephalexin 500 MG capsule  Commonly known as: KEFLEX  Take 1 capsule by mouth 3 (Three) Times a Day.     doxycycline 100 MG capsule  Commonly known as: MONODOX  Take 1 capsule by mouth 2 (Two) Times a Day.           Where to Get Your Medications      These medications were sent " to [facility] - [place], [place] - [address] - [phone]  - [phone] FX  [address], [facility] [address]    Phone: [phone]   · cephalexin 500 MG capsule  · doxycycline 100 MG capsule          Paul Harrison MD  06/19/22 5225

## 2022-09-16 ENCOUNTER — TRANSCRIBE ORDERS (OUTPATIENT)
Dept: ADMINISTRATIVE | Facility: HOSPITAL | Age: 87
End: 2022-09-16

## 2022-09-16 DIAGNOSIS — Z12.31 VISIT FOR SCREENING MAMMOGRAM: Primary | ICD-10-CM

## 2022-10-19 ENCOUNTER — HOSPITAL ENCOUNTER (OUTPATIENT)
Dept: MAMMOGRAPHY | Facility: HOSPITAL | Age: 87
Discharge: HOME | End: 2022-10-19
Admitting: OBSTETRICS & GYNECOLOGY

## 2022-10-19 DIAGNOSIS — Z12.31 VISIT FOR SCREENING MAMMOGRAM: ICD-10-CM

## 2022-10-19 PROCEDURE — 77063 BREAST TOMOSYNTHESIS BI: CPT | Performed by: RADIOLOGY

## 2022-10-19 PROCEDURE — 77067 SCR MAMMO BI INCL CAD: CPT

## 2022-10-19 PROCEDURE — 77067 SCR MAMMO BI INCL CAD: CPT | Performed by: RADIOLOGY

## 2022-10-19 PROCEDURE — 77063 BREAST TOMOSYNTHESIS BI: CPT

## 2023-02-02 ENCOUNTER — OFFICE VISIT (OUTPATIENT)
Dept: ORTHOPEDIC SURGERY | Facility: CLINIC | Age: 88
End: 2023-02-02
Payer: MEDICARE

## 2023-02-02 VITALS
HEIGHT: 62 IN | SYSTOLIC BLOOD PRESSURE: 134 MMHG | DIASTOLIC BLOOD PRESSURE: 86 MMHG | BODY MASS INDEX: 33.1 KG/M2 | WEIGHT: 179.9 LBS
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
## 2023-02-02 DIAGNOSIS — M25.512 ACUTE PAIN OF BOTH SHOULDERS: Primary | ICD-10-CM

## 2023-02-02 DIAGNOSIS — M19.011 OSTEOARTHRITIS OF AC (ACROMIOCLAVICULAR) JOINTS, BILATERAL: ICD-10-CM

## 2023-02-02 DIAGNOSIS — M19.011 OSTEOARTHRITIS OF BILATERAL GLENOHUMERAL JOINTS: ICD-10-CM

## 2023-02-02 DIAGNOSIS — M19.012 OSTEOARTHRITIS OF AC (ACROMIOCLAVICULAR) JOINTS, BILATERAL: ICD-10-CM

## 2023-02-02 DIAGNOSIS — M19.012 OSTEOARTHRITIS OF BILATERAL GLENOHUMERAL JOINTS: ICD-10-CM

## 2023-02-02 DIAGNOSIS — M79.18 MYOFASCIAL PAIN SYNDROME OF THORACIC SPINE: ICD-10-CM

## 2023-02-02 DIAGNOSIS — M25.511 ACUTE PAIN OF BOTH SHOULDERS: Primary | ICD-10-CM

## 2023-02-02 PROCEDURE — 99214 OFFICE O/P EST MOD 30 MIN: CPT | Performed by: STUDENT IN AN ORGANIZED HEALTH CARE EDUCATION/TRAINING PROGRAM

## 2023-02-02 NOTE — PROGRESS NOTES
[facility] Orthopa[facility] Surgery Office Visit     Office Visit       Date: 02/02/2023   Patient Name: Beth Fregoso  MRN: [id number]  YOB: 1935    Referring Physician: No ref. provider found     Chief Complaint:   Chief Complaint   Patient presents with   • Left Shoulder - Pain     Fall 01/27/2023   • Right Shoulder - Pain     Fall 01/27/2023       History of Present Illness:   Beth Fregoso is a 87 y.o. female who presents with new problem of: bilateral shoulder pain.  Onset: mechanical fall. The issue has been ongoing for 6 day(s). Pain is a 8/10 on the pain scale. Pain is described as aching. Associated symptoms include pain. The pain is worse with lying on affected side and any movement of the joint; ice, heat and pain medication and/or NSAID improve the pain. Previous treatments have included: nothing.    Subjective   Review of Systems: Review of Systems   Constitutional: Negative for chills, fever, unexpected weight gain and unexpected weight loss.   HENT: Negative for congestion, postnasal drip and rhinorrhea.    Eyes: Negative for blurred vision.   Respiratory: Negative for shortness of breath.    Cardiovascular: Negative for leg swelling.   Gastrointestinal: Negative for abdominal pain, nausea and vomiting.   Genitourinary: Negative for difficulty urinating.   Musculoskeletal: Positive for arthralgias. Negative for gait problem, joint swelling and myalgias.   Skin: Negative for skin lesions and wound.   Neurological: Negative for dizziness, weakness, light-headedness and numbness.   Hematological: Does not bruise/bleed easily.   Psychiatric/Behavioral: Negative for depressed mood.        I have reviewed the following portions of the patient's history:History of Present Illness and review of systems.    Past Medical History:   Past Medical History:   Diagnosis Date   • Breast cancer (HCC) 2014    Left    • Hx of radiation therapy 2014   • Hypertension         Past Surgical History:   Past Surgical History:   Procedure Laterality Date   • BREAST BIOPSY Left 2014   • BREAST LUMPECTOMY Left 2014   • REDUCTION MAMMAPLASTY         Family History:   Family History   Problem Relation Age of Onset   • Breast cancer Neg Hx    • Ovarian cancer Neg Hx        Social History:   Social History     Socioeconomic History   • Marital status:    Tobacco Use   • Smoking status: Never   • Smokeless tobacco: Never   Substance and Sexual Activity   • Alcohol use: Yes     Comment: SOCIAL   • Drug use: No   • Sexual activity: Defer       Medications:   Current Outpatient Medications:   •  albuterol sulfate  (90 Base) MCG/ACT inhaler, , Disp: , Rfl:   •  ALPRAZolam (XANAX) 0.5 MG tablet, , Disp: , Rfl: 0  •  amLODIPine (NORVASC) 5 MG tablet, , Disp: , Rfl:   •  Calcium Carbonate-Vit D-Min (CALCIUM 1200 PO), calcium, Disp: , Rfl:   •  CALCIUM PO, Take  by mouth., Disp: , Rfl:   •  cephalexin (KEFLEX) 500 MG capsule, Take 1 capsule by mouth 3 (Three) Times a Day., Disp: 21 capsule, Rfl: 0  •  Cholecalciferol (VITAMIN D PO), Take  by mouth., Disp: , Rfl:   •  doxazosin (CARDURA) 4 MG tablet, , Disp: , Rfl:   •  doxycycline (MONODOX) 100 MG capsule, Take 1 capsule by mouth 2 (Two) Times a Day., Disp: 14 capsule, Rfl: 0  •  FOLIC ACID PO, Take  by mouth., Disp: , Rfl:   •  hydroCHLOROthiazide (HYDRODIURIL) 25 MG tablet, , Disp: , Rfl:   •  meclizine (ANTIVERT) 25 MG tablet, , Disp: , Rfl:   •  Multiple Vitamin (MULTI VITAMIN PO), Take  by mouth., Disp: , Rfl:   •  multivitamin with minerals tablet tablet, See Admin Instructions., Disp: , Rfl:   •  olmesartan (BENICAR) 40 MG tablet, , Disp: , Rfl:   •  propranolol (INDERAL) 20 MG tablet, , Disp: , Rfl: 3    Allergies:   Allergies   Allergen Reactions   • Amoxicillin Rash       I reviewed the patient's chief complaint, history of present illness, review of systems, past medical history, surgical history, family history, social  "history, medications and allergy list.     Objective    Vital Signs:   Vitals:    02/02/23 1129   BP: 134/86   Weight: 81.6 kg (179 lb 14.3 oz)   Height: 157.5 cm (62.01\")     Body mass index is 32.89 kg/m².   BMI is >= 30 and <35. (Class 1 Obesity). The following options were offered after discussion;: exercise counseling/recommendations and nutrition counseling/recommendations     Patient reports that she is a non-smoker and has not ever been a smoker.  This behavior was applauded and she was encouraged to continue in smoking cessation.  We will continue to monitor at subsequent visits.    Ortho Exam:  Constitutional: General Appearance: healthy-appearing, NAD, and normal body habitus.   Psychiatric: Mood and Affect: normal mood and affect and active and alert.   Cardiovascular System: Arterial Pulses Right: radial normal. Arterial Pulses Left: radial normal.   C-Spine/Neck: Active Range of Motion: no crepitus or pain elicited on motion and flexion normal, extension normal, rotation normal, and lateral flexion normal.   Bilateral shoulders: Inspection: no misalignment, erythema, induration, swelling, or warmth and AC prominence normal. normal. Bony Palpation Right: tenderness of the acromioclavicular joint, the greater tuberosity, and the posterior shoulder between the scapulae and no tenderness of the clavicle. Soft Tissue Palpation Right: tenderness of the supraspinatus, the infraspinatus, the glenohumeral joint region, and the lateral cuff insertion. Special Tests Right: Hawkin's test positive and empty can sign positive.  100  exam RIGHT shoulder: forward flexion approximately 140 degrees. Abduction 140 degrees. Internal rotation L5. Motor testing supraspinatus 4+/5  exam LEFT shoulder: forward flexion approximately 140 degrees. Abduction 140 degrees. Internal rotation L5. Motor testing supraspinatus 4+/5    Results Review:   Imaging Results (Last 24 Hours)     Procedure Component Value Units Date/Time    XR " Shoulder 2+ View Bilateral [[id number]] Resulted: 02/02/23 1151     Updated: 02/02/23 1153    Narrative:      Indication: Bilateral shoulder pain    Views:AP lateral and scapular Y views of the shoulder are submitted.    Impression: There is no fracture subluxation or dislocation. The   glenohumeral joint space is well reduced with large osteophytes both at   the glenoid and on the humeral head.  A/C joint degeneration.  Worse left   compared to right.. There are no acute findings.    Comparison: Degenerative changes are more severe today compared to similar   images from 9/16/2019.         Procedures    Assessment / Plan    Assessment/Plan:   Diagnoses and all orders for this visit:    1. Acute pain of both shoulders (Primary)  -     XR Shoulder 2+ View Bilateral    2. Fall from slip, trip, or stumble, initial encounter    3. Osteoarthritis of bilateral glenohumeral joints    4. Osteoarthritis of AC (acromioclavicular) joints, bilateral    5. Myofascial pain syndrome of thoracic spine      Fall 6 days ago after tripping over a rug at a local pharmacy.  She has a history of chronic shoulder pain that was exacerbated by the fall.  Her radiographs show degenerative changes in the AC and glenohumeral joints that has advanced since 2019 when last imaged.  There are no acute changes.  She is tender globally in the shoulders the anterior lateral and posterior aspects.  Most tender between the scapulae.  Her motion is intact but with some pain at the extremes.  She has been taking Tylenol for symptoms and overall they are improved.  She also applies ice and heat.  Given that she is most tender over the myofascial posterior shoulder, I recommend anti-inflammatory medication to control pain.  Patient declines and wants to stay only on Tylenol.  She has a previous home exercise program for her shoulder and I encouraged her to do this to continue working out the pain and soreness.  Symptoms should only improve moving forward.   I will arrange for a 4-week follow-up to ensure that she is improving and answer any questions at that time.    Past documentation reviewed: 1/26/2023-office visit-Tripp Linder MD.    Past radiographs reviewed: 9/16/2029-radiographs of the left shoulder.    Past labs results reviewed: 8/12/2019-creatinine 0.63, EGFR 90.    Follow Up:   Return in about 4 weeks (around 3/2/2023) for Recheck.      Pantera Clark MD  [facility]

## 2023-03-31 ENCOUNTER — OFFICE VISIT (OUTPATIENT)
Dept: ORTHOPEDIC SURGERY | Facility: CLINIC | Age: 88
End: 2023-03-31
Payer: MEDICARE

## 2023-03-31 VITALS
DIASTOLIC BLOOD PRESSURE: 76 MMHG | WEIGHT: 179 LBS | BODY MASS INDEX: 32.94 KG/M2 | SYSTOLIC BLOOD PRESSURE: 120 MMHG | HEIGHT: 62 IN

## 2023-03-31 DIAGNOSIS — M70.42 PREPATELLAR BURSITIS OF LEFT KNEE: Primary | ICD-10-CM

## 2023-03-31 DIAGNOSIS — M25.562 LEFT KNEE PAIN, UNSPECIFIED CHRONICITY: ICD-10-CM

## 2023-03-31 PROCEDURE — 99213 OFFICE O/P EST LOW 20 MIN: CPT

## 2023-03-31 NOTE — PROGRESS NOTES
[facility] Orthopaedic Surgery Office Visit - Caity Evans PA-C    Office Visit       Patient Name: Beth Fregoso    Chief Complaint:   Chief Complaint   Patient presents with   • Left Knee - Pain       Referring Physician: No ref. provider found    History of Present Illness:   Beth Fregoso is a 87 y.o. female who presents with left knee pain.  Ongoing since 3/28/2023.  She had a twisting injury while playing cards with her friends. She does not recall a fall or bumping the knee on anything. Rates current pain a 6/10.  Described as burning and throbbing.  Taking Tylenol for the pain.  She says the pain has slowly improved since the injury.      Subjective     Review of Systems   Constitutional: Negative.  Negative for chills, fatigue and fever.   HENT: Negative.  Negative for congestion and dental problem.    Eyes: Negative.  Negative for blurred vision.   Respiratory: Negative.  Negative for shortness of breath.    Cardiovascular: Negative.  Negative for leg swelling.   Gastrointestinal: Negative.  Negative for abdominal pain.   Endocrine: Negative.  Negative for polyuria.   Genitourinary: Negative.  Negative for difficulty urinating.   Musculoskeletal: Positive for arthralgias.   Skin: Negative.    Allergic/Immunologic: Negative.    Neurological: Negative.    Hematological: Negative.  Negative for adenopathy.   Psychiatric/Behavioral: Negative.  Negative for behavioral problems.        Past Medical History:   Past Medical History:   Diagnosis Date   • Breast cancer 2014    Left    • Hx of radiation therapy 2014   • Hypertension        Past Surgical History:   Past Surgical History:   Procedure Laterality Date   • BREAST BIOPSY Left 2014   • BREAST LUMPECTOMY Left 2014   • REDUCTION MAMMAPLASTY         Family History:   Family History   Problem Relation Age of Onset   • Breast cancer Neg Hx    • Ovarian cancer Neg Hx        Social History:    Social History     Socioeconomic History   • Marital status:    Tobacco Use   • Smoking status: Never   • Smokeless tobacco: Never   Substance and Sexual Activity   • Alcohol use: Yes     Comment: SOCIAL   • Drug use: No   • Sexual activity: Defer       Medications:   Current Outpatient Medications:   •  albuterol sulfate  (90 Base) MCG/ACT inhaler, , Disp: , Rfl:   •  ALPRAZolam (XANAX) 0.5 MG tablet, , Disp: , Rfl: 0  •  amLODIPine (NORVASC) 5 MG tablet, , Disp: , Rfl:   •  Breo Ellipta 200-25 MCG/ACT inhaler, , Disp: , Rfl:   •  Calcium Carbonate-Vit D-Min (CALCIUM 1200 PO), calcium, Disp: , Rfl:   •  CALCIUM PO, Take  by mouth., Disp: , Rfl:   •  cephalexin (KEFLEX) 500 MG capsule, Take 1 capsule by mouth 3 (Three) Times a Day., Disp: 21 capsule, Rfl: 0  •  Cholecalciferol (VITAMIN D PO), Take  by mouth., Disp: , Rfl:   •  doxazosin (CARDURA) 4 MG tablet, , Disp: , Rfl:   •  doxycycline (MONODOX) 100 MG capsule, Take 1 capsule by mouth 2 (Two) Times a Day., Disp: 14 capsule, Rfl: 0  •  Dulera 100-5 MCG/ACT inhaler, , Disp: , Rfl:   •  FOLIC ACID PO, Take  by mouth., Disp: , Rfl:   •  Glycerin-Hypromellose- (ARTIFICIAL TEARS) 0.2-0.2-1 % solution ophthalmic solution, 1 drop., Disp: , Rfl:   •  hydroCHLOROthiazide (HYDRODIURIL) 25 MG tablet, , Disp: , Rfl:   •  meclizine (ANTIVERT) 25 MG tablet, , Disp: , Rfl:   •  Multiple Vitamin (MULTI VITAMIN PO), Take  by mouth., Disp: , Rfl:   •  multivitamin with minerals tablet tablet, See Admin Instructions., Disp: , Rfl:   •  olmesartan (BENICAR) 40 MG tablet, , Disp: , Rfl:   •  propranolol (INDERAL) 20 MG tablet, , Disp: , Rfl: 3  •  Diclofenac Sodium (VOLTAREN) 1 % gel gel, Apply 4 g topically to the appropriate area as directed 3 (Three) Times a Day As Needed (Use as needed to knee) for up to 30 days., Disp: 100 g, Rfl: 2    Allergies:   Allergies   Allergen Reactions   • Amoxicillin Rash       I have reviewed and updated the following portions  "of the patient's history and review of systems: allergies, current medications, past family history, past medical history, past social history, past surgical history and problem list.    Objective      Vital Signs:   Vitals:    03/31/23 0941   BP: 120/76   Weight: 81.2 kg (179 lb)   Height: 157.5 cm (62.01\")       Ortho Exam:  Knee Exam: LEFT  ----------  ALIGNMENT: neutral, no varus or valgus deformity     RANGE OF MOTION:  Normal (0-120 degrees) with no extensor lag or flexion contracture  LIGAMENTOUS STABILITY:   stable to varus and valgus stress at terminal extension and 30 degrees without any evidence of laxity     STRENGTH:  5/5 knee flexion, extension. 5/5 ankle dorsiflexion and plantarflexion.     PAIN WITH PALPATION: tender to palpation anterior knee around prepatellar bursa  KNEE EFFUSION: no  PAIN WITH KNEE ROM: no    STRAIGHT LEG TEST:   negative    SENSATION TO LIGHT TOUCH:  DEEP PERONEAL/SUPERFICIAL PERONEAL/SURAL/SAPHENOUS/TIBIAL:   intact    EDEMA:  no  ERYTHEMA:  no  WOUNDS/INCISIONS: no overlying skin problems.      Results Review:   XR Knee 4+ View Left  Imaging: knee x-rays 4 views    Side: Left knee    Indication for x-rays: knee pain    Comparison: Prior images from 2001    Findings:   No acute bony findings noted of left knee.  Stable findings compared to   prior images within the system.  No obvious effusion noted.    I personally reviewed the above x-rays.         Assessment / Plan      Assessment:  Diagnoses and all orders for this visit:    1. Prepatellar bursitis of left knee (Primary)  -     Diclofenac Sodium (VOLTAREN) 1 % gel gel; Apply 4 g topically to the appropriate area as directed 3 (Three) Times a Day As Needed (Use as needed to knee) for up to 30 days.  Dispense: 100 g; Refill: 2    2. Left knee pain, unspecified chronicity  -     XR Knee 4+ View Left  -     Diclofenac Sodium (VOLTAREN) 1 % gel gel; Apply 4 g topically to the appropriate area as directed 3 (Three) Times a Day As " Needed (Use as needed to knee) for up to 30 days.  Dispense: 100 g; Refill: 2        Plan:  1. Clinical signs of prepatellar bursitis given tenderness at this area.  No erythema or signs of infection.  May continue with Tylenol for the pain and swelling.  2. Prescription sent for Voltaren gel as well.  May use on patellar bursa.  3. Rest and ice left knee when possible.      Follow Up:   4 weeks        Caity Evans PA-C  [facility]       Dictated using Dragon Speech Recognition.

## 2023-10-20 ENCOUNTER — HOSPITAL ENCOUNTER (OUTPATIENT)
Dept: MAMMOGRAPHY | Facility: HOSPITAL | Age: 88
Discharge: HOME | End: 2023-10-20
Admitting: OBSTETRICS & GYNECOLOGY
Payer: MEDICARE

## 2023-10-20 DIAGNOSIS — Z12.31 SCREENING MAMMOGRAM FOR BREAST CANCER: ICD-10-CM

## 2023-10-20 PROCEDURE — 77067 SCR MAMMO BI INCL CAD: CPT

## 2023-10-20 PROCEDURE — 77063 BREAST TOMOSYNTHESIS BI: CPT

## 2023-10-30 ENCOUNTER — HOSPITAL ENCOUNTER (OUTPATIENT)
Dept: MAMMOGRAPHY | Facility: HOSPITAL | Age: 88
Discharge: HOME | End: 2023-10-30
Payer: MEDICARE

## 2023-10-30 DIAGNOSIS — Z12.31 VISIT FOR SCREENING MAMMOGRAM: ICD-10-CM

## 2024-07-31 ENCOUNTER — OFFICE VISIT (OUTPATIENT)
Dept: ORTHOPEDIC SURGERY | Facility: CLINIC | Age: 89
End: 2024-07-31
Payer: MEDICARE

## 2024-07-31 VITALS
WEIGHT: 170.8 LBS | BODY MASS INDEX: 32.25 KG/M2 | DIASTOLIC BLOOD PRESSURE: 52 MMHG | HEIGHT: 61 IN | SYSTOLIC BLOOD PRESSURE: 160 MMHG

## 2024-07-31 DIAGNOSIS — M16.12 PRIMARY OSTEOARTHRITIS OF LEFT HIP: Primary | ICD-10-CM

## 2024-07-31 PROCEDURE — 99214 OFFICE O/P EST MOD 30 MIN: CPT | Performed by: ORTHOPAEDIC SURGERY

## 2024-07-31 PROCEDURE — 1160F RVW MEDS BY RX/DR IN RCRD: CPT | Performed by: ORTHOPAEDIC SURGERY

## 2024-07-31 PROCEDURE — 1159F MED LIST DOCD IN RCRD: CPT | Performed by: ORTHOPAEDIC SURGERY

## 2024-07-31 NOTE — PROGRESS NOTES
[facility] Note    Subjective     Chief Complaint   Patient presents with    Left Hip - Pain        HPI    Beth Fregoso is a 88 y.o. female who presents with new problem of: left hip pain.  Onset: atraumatic and gradual in nature. The issue has been ongoing for 1 month(s). Pain is a 4/10 on the pain scale. Pain is described as aching. Associated symptoms include pain and stiffness. The pain is worse with walking and sleeping; heat and pain medication and/or NSAID improve the pain. Previous treatments have included: NSAIDS and physical therapy.  Pain is located in the groin region as well as in the posterior aspect of the hip.  No trauma.    I have reviewed the following portions of the patient's history and agree with: History of Present Illness and Review of Systems    There is no problem list on file for this patient.    Past Medical History:   Diagnosis Date    Breast cancer 2014    Left     Hx of radiation therapy 2014    Hypertension       Past Surgical History:   Procedure Laterality Date    BREAST BIOPSY Left 2014    BREAST LUMPECTOMY Left 2014    REDUCTION MAMMAPLASTY        Family History   Problem Relation Age of Onset    Breast cancer Neg Hx     Ovarian cancer Neg Hx      Social History     Socioeconomic History    Marital status:    Tobacco Use    Smoking status: Never    Smokeless tobacco: Never   Vaping Use    Vaping status: Never Used   Substance and Sexual Activity    Alcohol use: Yes     Comment: SOCIAL    Drug use: No    Sexual activity: Defer      Current Outpatient Medications on File Prior to Visit   Medication Sig Dispense Refill    albuterol sulfate  (90 Base) MCG/ACT inhaler       ALPRAZolam (XANAX) 0.5 MG tablet   0    amLODIPine (NORVASC) 5 MG tablet       Breo Ellipta 200-25 MCG/ACT inhaler       Calcium Carbonate-Vit D-Min (CALCIUM 1200 PO) calcium      CALCIUM PO Take  by mouth.      cephalexin (KEFLEX) 500 MG capsule Take 1 capsule by mouth 3  (Three) Times a Day. 21 capsule 0    Cholecalciferol (VITAMIN D PO) Take  by mouth.      doxazosin (CARDURA) 4 MG tablet       doxycycline (MONODOX) 100 MG capsule Take 1 capsule by mouth 2 (Two) Times a Day. 14 capsule 0    Dulera 100-5 MCG/ACT inhaler       FOLIC ACID PO Take  by mouth.      Glycerin-Hypromellose- (ARTIFICIAL TEARS) 0.2-0.2-1 % solution ophthalmic solution 1 drop.      hydroCHLOROthiazide (HYDRODIURIL) 25 MG tablet       meclizine (ANTIVERT) 25 MG tablet       Multiple Vitamin (MULTI VITAMIN PO) Take  by mouth.      multivitamin with minerals tablet tablet See Admin Instructions.      olmesartan (BENICAR) 40 MG tablet       predniSONE (DELTASONE) 10 MG tablet Take 1 tablet by mouth Daily.      propranolol (INDERAL) 20 MG tablet 2 tablets.  3     No current facility-administered medications on file prior to visit.      Allergies   Allergen Reactions    Amoxicillin Rash        Review of Systems   Constitutional:  Negative for activity change, appetite change, chills, diaphoresis, fatigue, fever and unexpected weight change.   HENT:  Negative for congestion, dental problem, drooling, ear discharge, ear pain, facial swelling, hearing loss, mouth sores, nosebleeds, postnasal drip, rhinorrhea, sinus pressure, sneezing, sore throat, tinnitus, trouble swallowing and voice change.    Eyes:  Negative for photophobia, pain, discharge, redness, itching and visual disturbance.   Respiratory:  Negative for apnea, cough, choking, chest tightness, shortness of breath, wheezing and stridor.    Cardiovascular:  Negative for chest pain, palpitations and leg swelling.   Gastrointestinal:  Negative for abdominal distention, abdominal pain, anal bleeding, blood in stool, constipation, diarrhea, nausea, rectal pain and vomiting.   Endocrine: Negative for cold intolerance, heat intolerance, polydipsia, polyphagia and polyuria.   Genitourinary:  Negative for decreased urine volume, difficulty urinating, dysuria,  "enuresis, flank pain, frequency, genital sores, hematuria and urgency.   Musculoskeletal:  Positive for arthralgias. Negative for back pain, gait problem, joint swelling, myalgias, neck pain and neck stiffness.   Skin:  Negative for color change, pallor, rash and wound.   Allergic/Immunologic: Negative for environmental allergies, food allergies and immunocompromised state.   Neurological:  Negative for dizziness, tremors, seizures, syncope, facial asymmetry, speech difficulty, weakness, light-headedness, numbness and headaches.   Hematological:  Negative for adenopathy. Does not bruise/bleed easily.   Psychiatric/Behavioral:  Negative for agitation, behavioral problems, confusion, decreased concentration, dysphoric mood, hallucinations, self-injury, sleep disturbance and suicidal ideas. The patient is not nervous/anxious and is not hyperactive.         Objective      Physical Exam  /52   Ht 154.5 cm (60.83\")   Wt 77.5 kg (170 lb 12.8 oz)   BMI 32.46 kg/m²     Body mass index is 32.46 kg/m².  BMI is >= 30 and <35. (Class 1 Obesity). The following options were offered after discussion;: referral to primary care        General:   Mental Status:  Alert   Appearance: Cooperative, in no acute distress   Build and Nutrition: Overweight by BMI female   Orientation: Alert and oriented to person, place and time   Posture: Normal   Gait: Mild limp on the left, using a walker    Integument:   Left hip: No skin lesions, no rash, no ecchymosis    Neurologic:   Sensation:    Left foot: Intact to light touch on the dorsal and plantar aspect   Motor:  Left lower extremity: 5/5 quadriceps, hamstrings, ankle dorsiflexors, and ankle plantar flexors  Vascular:   Left lower extremity: 2+ dorsalis pedis pulse, prompt capillary refill    Lower Extremity:   Left Hip:    Tenderness:  None    Swelling:  None    Crepitus:  None    Atrophy:  None    Range of motion:  External Rotation: 30°       Internal " Rotation: 30°       Flexion:  100°       Extension:  0°   Instability:  None  Deformities:  None  Functional testing: Positive Stinchfield    No leg length discrepancy      Imaging/Studies      Imaging Results (Last 24 Hours)       Procedure Component Value Units Date/Time    XR Hip With or Without Pelvis 2 - 3 View Left [[id number]] Resulted: 07/31/24 1012     Updated: 07/31/24 1013    Narrative:      Left Hip Radiographs  Indication: left hip pain  Views: low AP pelvis and lateral of the left hip    Comparison: 5/13/2019    Findings:   Joint space narrowing, femoral neck osteophytes, acetabular sided   osteophytes, no acute bony abnormalities.  No unusual bony features.  Mild   worsening compared to the previous imaging.              Assessment and Plan     Diagnoses and all orders for this visit:    1. Primary osteoarthritis of left hip (Primary)  -     XR Hip With or Without Pelvis 2 - 3 View Left  -     External Facility Surgical/Procedural Request; Future        1. Primary osteoarthritis of left hip        I reviewed my findings with the patient.  We discussed options for her left hip arthritis today, and she would like to try an intra-articular hip injection.  She is not keen on surgical intervention.  I will see her back about 4 weeks after her intra-articular hip injection, but I will be happy to see her back sooner for any problems.    Return in about 4 weeks (around 8/28/2024) for after hip injection.      Juan A Isaacs MD  07/31/24  10:25 EDT    Dictated Utilizing Dragon Dictation

## 2024-08-08 ENCOUNTER — OUTSIDE FACILITY SERVICE (OUTPATIENT)
Dept: ORTHOPEDIC SURGERY | Facility: CLINIC | Age: 89
End: 2024-08-08
Payer: MEDICARE

## 2024-08-08 ENCOUNTER — DOCUMENTATION (OUTPATIENT)
Dept: ORTHOPEDIC SURGERY | Facility: CLINIC | Age: 89
End: 2024-08-08

## 2024-08-08 NOTE — PROGRESS NOTES
[facility]    Operative Report    [facility]  [address] [place] [address]    DATE OF PROCEDURE: 08/08/24    PREOPERATIVE DIAGNOSIS: left hip arthritis    POSTOPERATIVE DIAGNOSIS:  left hip arthritis    PROCEDURES PERFORMED:  left hip injection under fluoroscopic guidance    SURGEON: Juan A Isaacs MD    SPECIMENS: None    ESTIMATED BLOOD LOSS: None    COMPLICATIONS: None    INDICATIONS: The patient is a 88 y.o. female with left hip pain secondary to osteoarthritis that failed to improve in spite of conservative treatment.  Options have been discussed at length with the patient, and patient has reached the point where the patient desires to proceed with an intra-articular hip injection understanding the risks, benefits, and alternatives. Consent was obtained. Please see my office notes for details with regard to preprocedure counseling and rationale.     DESCRIPTION OF PROCEDURE: The patient was positively identified in the preoperative holding area and brought to the operating suite and placed in a supine position.  Timeout procedure was performed to confirm the injection site, as well as other parameters. Following the sterile prep and drape, a 20-gauge spinal needle was placed into the hip joint, and confirmed to be in an intra-articular location with radiographic dye, and subsequently infiltrated with 40 mg of Kenalog mixed with ropivacaine.    The patient tolerated the procedure well and was brought to the recovery room in good condition.  The patient noted 60% improvement after walking from the operating room to the recovery room.    PLAN:  1.  The patient will keep a written or mental diary of how well the injection works and for how long.  2.  Follow up in 4 weeks as planned in the office.    Future Appointments   Date Time Provider Department Center   9/16/2024  8:40 AM Juan A Isaacs MD [facility]       Juan A Isaacs,  MD  08/08/24  07:36 EDT

## 2024-09-05 ENCOUNTER — APPOINTMENT (OUTPATIENT)
Dept: GENERAL RADIOLOGY | Facility: HOSPITAL | Age: 89
DRG: 194 | End: 2024-09-05
Payer: MEDICARE

## 2024-09-05 ENCOUNTER — HOSPITAL ENCOUNTER (INPATIENT)
Facility: HOSPITAL | Age: 89
LOS: 4 days | Discharge: HOME | DRG: 194 | End: 2024-09-09
Attending: EMERGENCY MEDICINE | Admitting: INTERNAL MEDICINE
Payer: MEDICARE

## 2024-09-05 DIAGNOSIS — J18.9 PNEUMONIA DUE TO INFECTIOUS ORGANISM, UNSPECIFIED LATERALITY, UNSPECIFIED PART OF LUNG: ICD-10-CM

## 2024-09-05 DIAGNOSIS — J44.9 CHRONIC OBSTRUCTIVE PULMONARY DISEASE, UNSPECIFIED COPD TYPE: ICD-10-CM

## 2024-09-05 DIAGNOSIS — J18.9 PNEUMONIA OF RIGHT MIDDLE LOBE DUE TO INFECTIOUS ORGANISM: Primary | ICD-10-CM

## 2024-09-05 LAB
ALBUMIN SERPL-MCNC: 3.6 G/DL (ref 3.5–5.2)
ALBUMIN/GLOB SERPL: 1.4 G/DL
ALP SERPL-CCNC: 68 U/L (ref 39–117)
ALT SERPL W P-5'-P-CCNC: 8 U/L (ref 1–33)
ANION GAP SERPL CALCULATED.3IONS-SCNC: 10 MMOL/L (ref 5–15)
ARTERIAL PATENCY WRIST A: POSITIVE
AST SERPL-CCNC: 8 U/L (ref 1–32)
ATMOSPHERIC PRESS: ABNORMAL MM[HG]
B PARAPERT DNA SPEC QL NAA+PROBE: NOT DETECTED
B PERT DNA SPEC QL NAA+PROBE: NOT DETECTED
BASE EXCESS BLDA CALC-SCNC: 6.5 MMOL/L (ref 0–2)
BASOPHILS # BLD AUTO: 0.02 10*3/MM3 (ref 0–0.2)
BASOPHILS NFR BLD AUTO: 0.1 % (ref 0–1.5)
BDY SITE: ABNORMAL
BILIRUB SERPL-MCNC: 0.8 MG/DL (ref 0–1.2)
BODY TEMPERATURE: 37
BUN SERPL-MCNC: 14 MG/DL (ref 8–23)
BUN/CREAT SERPL: 24.6 (ref 7–25)
C PNEUM DNA NPH QL NAA+NON-PROBE: NOT DETECTED
CALCIUM SPEC-SCNC: 8.7 MG/DL (ref 8.6–10.5)
CHLORIDE SERPL-SCNC: 90 MMOL/L (ref 98–107)
CO2 BLDA-SCNC: 31.2 MMOL/L (ref 22–33)
CO2 SERPL-SCNC: 27 MMOL/L (ref 22–29)
COHGB MFR BLD: 1.6 % (ref 0–2)
CREAT SERPL-MCNC: 0.57 MG/DL (ref 0.57–1)
D-LACTATE SERPL-SCNC: 0.9 MMOL/L (ref 0.5–2)
DEPRECATED RDW RBC AUTO: 41.4 FL (ref 37–54)
EGFRCR SERPLBLD CKD-EPI 2021: 87.5 ML/MIN/1.73
EOSINOPHIL # BLD AUTO: 0.06 10*3/MM3 (ref 0–0.4)
EOSINOPHIL NFR BLD AUTO: 0.4 % (ref 0.3–6.2)
EPAP: 0
ERYTHROCYTE [DISTWIDTH] IN BLOOD BY AUTOMATED COUNT: 13.2 % (ref 12.3–15.4)
FLUAV SUBTYP SPEC NAA+PROBE: NOT DETECTED
FLUBV RNA ISLT QL NAA+PROBE: NOT DETECTED
GLOBULIN UR ELPH-MCNC: 2.5 GM/DL
GLUCOSE SERPL-MCNC: 112 MG/DL (ref 65–99)
HADV DNA SPEC NAA+PROBE: NOT DETECTED
HCO3 BLDA-SCNC: 30 MMOL/L (ref 20–26)
HCOV 229E RNA SPEC QL NAA+PROBE: NOT DETECTED
HCOV HKU1 RNA SPEC QL NAA+PROBE: NOT DETECTED
HCOV NL63 RNA SPEC QL NAA+PROBE: NOT DETECTED
HCOV OC43 RNA SPEC QL NAA+PROBE: NOT DETECTED
HCT VFR BLD AUTO: 34.6 % (ref 34–46.6)
HCT VFR BLD CALC: 37.5 % (ref 38–51)
HGB BLD-MCNC: 11.8 G/DL (ref 12–15.9)
HGB BLDA-MCNC: 12.2 G/DL (ref 14–18)
HMPV RNA NPH QL NAA+NON-PROBE: NOT DETECTED
HOLD SPECIMEN: NORMAL
HPIV1 RNA ISLT QL NAA+PROBE: NOT DETECTED
HPIV2 RNA SPEC QL NAA+PROBE: NOT DETECTED
HPIV3 RNA NPH QL NAA+PROBE: NOT DETECTED
HPIV4 P GENE NPH QL NAA+PROBE: NOT DETECTED
IMM GRANULOCYTES # BLD AUTO: 0.05 10*3/MM3 (ref 0–0.05)
IMM GRANULOCYTES NFR BLD AUTO: 0.4 % (ref 0–0.5)
INHALED O2 CONCENTRATION: 28 %
IPAP: 0
LYMPHOCYTES # BLD AUTO: 1.22 10*3/MM3 (ref 0.7–3.1)
LYMPHOCYTES NFR BLD AUTO: 8.9 % (ref 19.6–45.3)
M PNEUMO IGG SER IA-ACNC: NOT DETECTED
MCH RBC QN AUTO: 29.4 PG (ref 26.6–33)
MCHC RBC AUTO-ENTMCNC: 34.1 G/DL (ref 31.5–35.7)
MCV RBC AUTO: 86.1 FL (ref 79–97)
METHGB BLD QL: 0.2 % (ref 0–1.5)
MODALITY: ABNORMAL
MONOCYTES # BLD AUTO: 1.4 10*3/MM3 (ref 0.1–0.9)
MONOCYTES NFR BLD AUTO: 10.2 % (ref 5–12)
NEUTROPHILS NFR BLD AUTO: 10.98 10*3/MM3 (ref 1.7–7)
NEUTROPHILS NFR BLD AUTO: 80 % (ref 42.7–76)
NRBC BLD AUTO-RTO: 0 /100 WBC (ref 0–0.2)
NT-PROBNP SERPL-MCNC: 2397 PG/ML (ref 0–1800)
OXYHGB MFR BLDV: 91.2 % (ref 94–99)
PAW @ PEAK INSP FLOW SETTING VENT: 0 CMH2O
PCO2 BLDA: 38.1 MM HG (ref 35–45)
PCO2 TEMP ADJ BLD: 38.1 MM HG (ref 35–45)
PH BLDA: 7.5 PH UNITS (ref 7.35–7.45)
PH, TEMP CORRECTED: 7.5 PH UNITS
PLATELET # BLD AUTO: 212 10*3/MM3 (ref 140–450)
PMV BLD AUTO: 9.2 FL (ref 6–12)
PO2 BLDA: 58.8 MM HG (ref 83–108)
PO2 TEMP ADJ BLD: 58.8 MM HG (ref 83–108)
POTASSIUM SERPL-SCNC: 4.4 MMOL/L (ref 3.5–5.2)
PROCALCITONIN SERPL-MCNC: 0.1 NG/ML (ref 0–0.25)
PROT SERPL-MCNC: 6.1 G/DL (ref 6–8.5)
RBC # BLD AUTO: 4.02 10*6/MM3 (ref 3.77–5.28)
RHINOVIRUS RNA SPEC NAA+PROBE: DETECTED
RSV RNA NPH QL NAA+NON-PROBE: NOT DETECTED
SARS-COV-2 RNA NPH QL NAA+NON-PROBE: NOT DETECTED
SODIUM SERPL-SCNC: 127 MMOL/L (ref 136–145)
TOTAL RATE: 0 BREATHS/MINUTE
TROPONIN T SERPL HS-MCNC: 12 NG/L
WBC NRBC COR # BLD AUTO: 13.73 10*3/MM3 (ref 3.4–10.8)
WHOLE BLOOD HOLD COAG: NORMAL
WHOLE BLOOD HOLD SPECIMEN: NORMAL

## 2024-09-05 PROCEDURE — 80053 COMPREHEN METABOLIC PANEL: CPT | Performed by: EMERGENCY MEDICINE

## 2024-09-05 PROCEDURE — 93005 ELECTROCARDIOGRAM TRACING: CPT | Performed by: EMERGENCY MEDICINE

## 2024-09-05 PROCEDURE — 0202U NFCT DS 22 TRGT SARS-COV-2: CPT | Performed by: PHYSICIAN ASSISTANT

## 2024-09-05 PROCEDURE — 93005 ELECTROCARDIOGRAM TRACING: CPT

## 2024-09-05 PROCEDURE — 82375 ASSAY CARBOXYHB QUANT: CPT

## 2024-09-05 PROCEDURE — 36415 COLL VENOUS BLD VENIPUNCTURE: CPT

## 2024-09-05 PROCEDURE — 85025 COMPLETE CBC W/AUTO DIFF WBC: CPT | Performed by: EMERGENCY MEDICINE

## 2024-09-05 PROCEDURE — 82805 BLOOD GASES W/O2 SATURATION: CPT

## 2024-09-05 PROCEDURE — 87040 BLOOD CULTURE FOR BACTERIA: CPT | Performed by: PHYSICIAN ASSISTANT

## 2024-09-05 PROCEDURE — 83050 HGB METHEMOGLOBIN QUAN: CPT

## 2024-09-05 PROCEDURE — 83880 ASSAY OF NATRIURETIC PEPTIDE: CPT | Performed by: EMERGENCY MEDICINE

## 2024-09-05 PROCEDURE — 83605 ASSAY OF LACTIC ACID: CPT | Performed by: PHYSICIAN ASSISTANT

## 2024-09-05 PROCEDURE — 36600 WITHDRAWAL OF ARTERIAL BLOOD: CPT

## 2024-09-05 PROCEDURE — 71045 X-RAY EXAM CHEST 1 VIEW: CPT

## 2024-09-05 PROCEDURE — 84145 PROCALCITONIN (PCT): CPT | Performed by: PHYSICIAN ASSISTANT

## 2024-09-05 PROCEDURE — 84484 ASSAY OF TROPONIN QUANT: CPT | Performed by: EMERGENCY MEDICINE

## 2024-09-05 PROCEDURE — 99291 CRITICAL CARE FIRST HOUR: CPT

## 2024-09-05 RX ORDER — SODIUM CHLORIDE 0.9 % (FLUSH) 0.9 %
10 SYRINGE (ML) INJECTION AS NEEDED
Status: DISCONTINUED | OUTPATIENT
Start: 2024-09-05 | End: 2024-09-09 | Stop reason: HOSPADM

## 2024-09-05 NOTE — ED PROVIDER NOTES
"Subjective  History of Present Illness:    Chief Complaint: Shortness of breath  History of Present Illness: 88-year-old female presents with shortness of breath, she has a history of COPD, she uses 2 inhalers at home daily.  She also has a nebulizer machine that she has used more frequently over the last week to a week and a half.  She was seen by her primary care physician 2 days ago, and was prescribed an antibiotic, and steroids.  She purchased a pulse ox and took her oxygen saturation today and noticed it was at 81%, at which point she came into the ER to be evaluated  Onset: Gradual  Duration: 1 to 2 weeks  Exacerbating / Alleviating factors: Worse with activity  Associated symptoms: Weakness      Nurses Notes reviewed and agree, including vitals, allergies, social history and prior medical history.     REVIEW OF SYSTEMS: All systems reviewed and not pertinent unless noted.    Review of Systems   Respiratory:  Positive for shortness of breath.    All other systems reviewed and are negative.      Past Medical History:   Diagnosis Date    Breast cancer 2014    Left     Hx of radiation therapy 2014    Hypertension        Allergies:    Amoxicillin      Past Surgical History:   Procedure Laterality Date    BREAST BIOPSY Left 2014    BREAST LUMPECTOMY Left 2014    REDUCTION MAMMAPLASTY           Social History     Socioeconomic History    Marital status:    Tobacco Use    Smoking status: Never    Smokeless tobacco: Never   Vaping Use    Vaping status: Never Used   Substance and Sexual Activity    Alcohol use: Yes     Comment: SOCIAL    Drug use: No    Sexual activity: Defer         Family History   Problem Relation Age of Onset    Breast cancer Neg Hx     Ovarian cancer Neg Hx        Objective  Physical Exam:  /73   Pulse 75   Temp 98.9 °F (37.2 °C) (Oral)   Resp 16   Ht 154.9 cm (61\")   Wt 77.6 kg (171 lb)   SpO2 94%   BMI 32.31 kg/m²      Physical Exam  Vitals and nursing note reviewed. "   Constitutional:       Appearance: She is well-developed.   HENT:      Head: Normocephalic and atraumatic.   Cardiovascular:      Rate and Rhythm: Normal rate and regular rhythm.   Pulmonary:      Effort: Pulmonary effort is normal.      Breath sounds: Normal breath sounds.   Abdominal:      Palpations: Abdomen is soft.   Musculoskeletal:         General: Normal range of motion.      Cervical back: Normal range of motion and neck supple.   Skin:     General: Skin is warm and dry.   Neurological:      Mental Status: She is alert and oriented to person, place, and time.      Deep Tendon Reflexes: Reflexes are normal and symmetric.           Critical Care    Performed by: Thiago Riggs Jr., PA-C  Authorized by: Dionisio Shine DO    Critical care provider statement:     Critical care time (minutes):  30    Critical care time was exclusive of:  Separately billable procedures and treating other patients and teaching time    Critical care was necessary to treat or prevent imminent or life-threatening deterioration of the following conditions: Acute respiratory failure hypoxia, pneumonia, requiring supplemental oxygen via nasal cannula, broad-spectrum antibiotics and admission.    Critical care was time spent personally by me on the following activities:  Blood draw for specimens, development of treatment plan with patient or surrogate, discussions with consultants, discussions with primary provider, evaluation of patient's response to treatment, examination of patient, obtaining history from patient or surrogate, ordering and performing treatments and interventions, ordering and review of laboratory studies, ordering and review of radiographic studies, pulse oximetry, re-evaluation of patient's condition and review of old charts    Care discussed with: admitting provider        ED Course:    ED Course as of 09/06/24 0010   Thu Sep 05, 2024   1935 EKG interpretation by me: Sinus rhythm, right axis deviation, no acute T  wave abnormality abnormal EKG [CS]   1937 Review of previous  non ED visits, prior labs, prior imaging, available notes from prior evaluations or visits with specialists, medication list, allergies, past medical history, past surgical history     [CS]   2116 Chest x-ray interpretation by me: Patchy airspace disease consistent with pneumonia [CS]   2300 PSI/PORT Score: Pneumonia Severity Index for CAP - MDCalc  Calculated on Sep 05 2024 11:00 PM  118 points -> Risk Class IV, 8.2-9.3% mortality. Hospitalization recommended based on risk. [CS]   2327 I updated the patient/family  on all pending labs and lab results, and all pending radiology and  results and answered all questions.   [CS]      ED Course User Index  [CS] Thiago Riggs Jr., TAO       Lab Results (last 24 hours)       Procedure Component Value Units Date/Time    CBC & Differential [[id number]]  (Abnormal) Collected: 09/05/24 1959    Specimen: Blood Updated: 09/05/24 2006    Narrative:      The following orders were created for panel order CBC & Differential.  Procedure                               Abnormality         Status                     ---------                               -----------         ------                     CBC Auto Differential[[id number]]        Abnormal            Final result                 Please view results for these tests on the individual orders.    Comprehensive Metabolic Panel [[id number]]  (Abnormal) Collected: 09/05/24 1959    Specimen: Blood Updated: 09/05/24 2027     Glucose 112 mg/dL      BUN 14 mg/dL      Creatinine 0.57 mg/dL      Sodium 127 mmol/L      Potassium 4.4 mmol/L      Chloride 90 mmol/L      CO2 27.0 mmol/L      Calcium 8.7 mg/dL      Total Protein 6.1 g/dL      Albumin 3.6 g/dL      ALT (SGPT) 8 U/L      AST (SGOT) 8 U/L      Alkaline Phosphatase 68 U/L      Total Bilirubin 0.8 mg/dL      Globulin 2.5 gm/dL      Comment: Calculated Result        A/G Ratio 1.4 g/dL      BUN/Creatinine Ratio 24.6      Anion Gap 10.0 mmol/L      eGFR 87.5 mL/min/1.73     Narrative:      GFR Normal >60  Chronic Kidney Disease <60  Kidney Failure <15    The GFR formula is only valid for adults with stable renal function between ages 18 and 70.    BNP [[id number]]  (Abnormal) Collected: 09/05/24 1959    Specimen: Blood Updated: 09/05/24 2025     proBNP 2,397.0 pg/mL     Narrative:      This assay is used as an aid in the diagnosis of individuals suspected of having heart failure. It can be used as an aid in the diagnosis of acute decompensated heart failure (ADHF) in patients presenting with signs and symptoms of ADHF to the emergency department (ED). In addition, NT-proBNP of <300 pg/mL indicates ADHF is not likely.    Age Range Result Interpretation  NT-proBNP Concentration (pg/mL:      <50             Positive            >450                   Gray                 300-450                    Negative             <300    50-75           Positive            >900                  Gray                300-900                  Negative            <300      >75             Positive            >1800                  Gray                300-1800                  Negative            <300    Single High Sensitivity Troponin T [[id number]]  (Normal) Collected: 09/05/24 1959    Specimen: Blood Updated: 09/05/24 2025     HS Troponin T 12 ng/L     Narrative:      High Sensitive Troponin T Reference Range:  <14.0 ng/L- Negative Female for AMI  <22.0 ng/L- Negative Male for AMI  >=14 - Abnormal Female indicating possible myocardial injury.  >=22 - Abnormal Male indicating possible myocardial injury.   Clinicians would have to utilize clinical acumen, EKG, Troponin, and serial changes to determine if it is an Acute Myocardial Infarction or myocardial injury due to an underlying chronic condition.         CBC Auto Differential [[id number]]  (Abnormal) Collected: 09/05/24 1959    Specimen: Blood Updated: 09/05/24 2006     WBC 13.73 10*3/mm3      RBC 4.02  "10*6/mm3      Hemoglobin 11.8 g/dL      Hematocrit 34.6 %      MCV 86.1 fL      MCH 29.4 pg      MCHC 34.1 g/dL      RDW 13.2 %      RDW-SD 41.4 fl      MPV 9.2 fL      Platelets 212 10*3/mm3      Neutrophil % 80.0 %      Lymphocyte % 8.9 %      Monocyte % 10.2 %      Eosinophil % 0.4 %      Basophil % 0.1 %      Immature Grans % 0.4 %      Neutrophils, Absolute 10.98 10*3/mm3      Lymphocytes, Absolute 1.22 10*3/mm3      Monocytes, Absolute 1.40 10*3/mm3      Eosinophils, Absolute 0.06 10*3/mm3      Basophils, Absolute 0.02 10*3/mm3      Immature Grans, Absolute 0.05 10*3/mm3      nRBC 0.0 /100 WBC     Lactic Acid, Plasma [[id number]]  (Normal) Collected: 09/05/24 1959    Specimen: Blood Updated: 09/05/24 2020     Lactate 0.9 mmol/L      Comment: Falsely depressed results may occur on samples drawn from patients receiving N-Acetylcysteine (NAC) or Metamizole.       Procalcitonin [[id number]]  (Normal) Collected: 09/05/24 1959    Specimen: Blood Updated: 09/05/24 2031     Procalcitonin 0.10 ng/mL     Narrative:      As a Marker for Sepsis (Non-Neonates):    1. <0.5 ng/mL represents a low risk of severe sepsis and/or septic shock.  2. >2 ng/mL represents a high risk of severe sepsis and/or septic shock.    As a Marker for Lower Respiratory Tract Infections that require antibiotic therapy:    PCT on Admission    Antibiotic Therapy       6-12 Hrs later    >0.5                Strongly Recommended  >0.25 - <0.5        Recommended   0.1 - 0.25          Discouraged              Remeasure/reassess PCT  <0.1                Strongly Discouraged     Remeasure/reassess PCT    As 28 day mortality risk marker: \"Change in Procalcitonin Result\" (>80% or <=80%) if Day 0 (or Day 1) and Day 4 values are available. Refer to http://www.[facility]-pct-calculator.com    Change in PCT <=80%  A decrease of PCT levels below or equal to 80% defines a positive change in PCT test result representing a higher risk for 28-day all-cause mortality of " patients diagnosed with severe sepsis for septic shock.    Change in PCT >80%  A decrease of PCT levels of more than 80% defines a negative change in PCT result representing a lower risk for 28-day all-cause mortality of patients diagnosed with severe sepsis or septic shock.       Respiratory Panel PCR w/COVID-19(SARS-CoV-2) [facility] In-House, NP Swab in UTM/VTM, 2 HR TAT - Swab, Nasopharynx [[id number]]  (Abnormal) Collected: 09/05/24 2218    Specimen: Swab from Nasopharynx Updated: 09/05/24 2320     ADENOVIRUS, PCR Not Detected     Coronavirus 229E Not Detected     Coronavirus HKU1 Not Detected     Coronavirus NL63 Not Detected     Coronavirus OC43 Not Detected     COVID19 Not Detected     Human Metapneumovirus Not Detected     Human Rhinovirus/Enterovirus Detected     Influenza A PCR Not Detected     Influenza B PCR Not Detected     Parainfluenza Virus 1 Not Detected     Parainfluenza Virus 2 Not Detected     Parainfluenza Virus 3 Not Detected     Parainfluenza Virus 4 Not Detected     RSV, PCR Not Detected     Bordetella pertussis pcr Not Detected     Bordetella parapertussis PCR Not Detected     Chlamydophila pneumoniae PCR Not Detected     Mycoplasma pneumo by PCR Not Detected    Narrative:      In the setting of a positive respiratory panel with a viral infection PLUS a negative procalcitonin without other underlying concern for bacterial infection, consider observing off antibiotics or discontinuation of antibiotics and continue supportive care. If the respiratory panel is positive for atypical bacterial infection (Bordetella pertussis, Chlamydophila pneumoniae, or Mycoplasma pneumoniae), consider antibiotic de-escalation to target atypical bacterial infection.    Blood Gas, Arterial With Co-Ox [[id number]]  (Abnormal) Collected: 09/05/24 2314    Specimen: Arterial Blood Updated: 09/05/24 2314     Site Right Radial     Milton's Test Positive     pH, Arterial 7.504 pH units      Comment: 83 Value  above reference range        pCO2, Arterial 38.1 mm Hg      pO2, Arterial 58.8 mm Hg      Comment: 84 Value below reference range        HCO3, Arterial 30.0 mmol/L      Base Excess, Arterial 6.5 mmol/L      Hemoglobin, Blood Gas 12.2 g/dL      Comment: 84 Value below reference range        Hematocrit, Blood Gas 37.5 %      Oxyhemoglobin 91.2 %      Comment: 84 Value below reference range        Methemoglobin 0.20 %      Carboxyhemoglobin 1.6 %      CO2 Content 31.2 mmol/L      Temperature 37.0     Barometric Pressure for Blood Gas --     Comment: N/A        Modality Nasal Cannula     FIO2 28 %      Rate 0 Breaths/minute      PIP 0 cmH2O      Comment: Meter: [id number]     :  [id number]        IPAP 0     EPAP 0     pH, Temp Corrected 7.504 pH Units      pCO2, Temperature Corrected 38.1 mm Hg      pO2, Temperature Corrected 58.8 mm Hg     Blood Culture - Blood, Wrist, Right [[id number]] Collected: 09/05/24 2325    Specimen: Blood from Wrist, Right Updated: 09/05/24 2344    Blood Culture - Blood, Arm, Right [[id number]] Collected: 09/05/24 2325    Specimen: Blood from Arm, Right Updated: 09/05/24 2344             XR Chest 1 View    Result Date: 9/5/2024  XR CHEST 1 VW Date of Exam: 9/5/2024 7:35 PM EDT Indication: SOA triage protocol Comparison: Chest radiograph 5/8/2017 Findings: Mild cardiomegaly. Aortic atherosclerotic disease. Patchy bibasilar airspace opacities which may also involve the middle lobe and lingula. Blunted costophrenic angles consistent with small effusions. Probable peribronchial thickening. No overt edema or pneumothorax. Degenerative related osseous changes.     Impression: Impression: Patchy multifocal airspace disease suspicious for pneumonia pneumonia or aspiration with small bilateral pleural effusions. Commend radiographic follow-up to resolution in 6 to 8 weeks. Electronically Signed: Korey Chanel MD  9/5/2024 7:57 PM EDT  Workstation ID: [id number]        Medical Decision  Making  Patient Presentation 88-year-old female presented with cough, congestion, weakness, Omontys assessment she was hypoxic, and in mild respiratory distress    DDX pneumonia, acute respiratory failure hypoxia, COVID, flu, pulmonary edema, pleural effusion, sepsis, acute kidney insufficiency    Data Review/ Non ED Records /Analysis/Ordering unique tests  Review of previous  non ED visits, prior labs, prior imaging, available notes from prior evaluations or visits with specialists, medication list, allergies, past medical history, past surgical history        Independent Review Studies  I Personally reviewed all laboratory studies performed in the emergency department     Intervention/Re-evaluation intervention included oxygen via nasal cannula on reevaluation symptoms did improve    Independent Clinician discussed the case with the on-call hospitalist , discussed the case with the on-call pharmacist for antibiotic initiation    Risk Stratification tools/clinical decision rules patient presented with cough, congestion, weakness, and hypoxia, significant concern for respiratory failure hypoxia, pneumonia, COVID or flu.  High Probability of sudden clinically significant, or life threatening deterioration in the patients condition requiring direct delivery of care and management,  and high complexity decision making emergent intervention included oxygen via nasal cannula for respiratory failure hypoxia, broad-spectrum antibiotics for multifocal pneumonia, reports port score was 118, increased risk.    Shared Decision Making discussed all findings with patient and family, they were agreeable    Disposition stable for admission    Problems Addressed:  Pneumonia of right middle lobe due to infectious organism: complicated acute illness or injury    Amount and/or Complexity of Data Reviewed  Independent Historian: caregiver  External Data Reviewed: labs, radiology and notes.  Labs: ordered. Decision-making details  documented in ED Course.  Radiology: ordered and independent interpretation performed. Decision-making details documented in ED Course.  ECG/medicine tests: ordered and independent interpretation performed. Decision-making details documented in ED Course.    Risk  Prescription drug management.  Decision regarding hospitalization.  Risk Details: .high          Final diagnoses:   Pneumonia of right middle lobe due to infectious organism           Disposition admission       Thiago Riggs Jr., PA-C  09/06/24 0010

## 2024-09-06 ENCOUNTER — APPOINTMENT (OUTPATIENT)
Dept: CARDIOLOGY | Facility: HOSPITAL | Age: 89
DRG: 194 | End: 2024-09-06
Payer: MEDICARE

## 2024-09-06 LAB
ANION GAP SERPL CALCULATED.3IONS-SCNC: 14 MMOL/L (ref 5–15)
BUN SERPL-MCNC: 11 MG/DL (ref 8–23)
BUN/CREAT SERPL: 23.9 (ref 7–25)
CALCIUM SPEC-SCNC: 8.5 MG/DL (ref 8.6–10.5)
CHLORIDE SERPL-SCNC: 92 MMOL/L (ref 98–107)
CO2 SERPL-SCNC: 27 MMOL/L (ref 22–29)
CREAT SERPL-MCNC: 0.46 MG/DL (ref 0.57–1)
DEPRECATED RDW RBC AUTO: 41.5 FL (ref 37–54)
EGFRCR SERPLBLD CKD-EPI 2021: 92.2 ML/MIN/1.73
ERYTHROCYTE [DISTWIDTH] IN BLOOD BY AUTOMATED COUNT: 13.1 % (ref 12.3–15.4)
GLUCOSE SERPL-MCNC: 104 MG/DL (ref 65–99)
HCT VFR BLD AUTO: 33.5 % (ref 34–46.6)
HGB BLD-MCNC: 11.2 G/DL (ref 12–15.9)
L PNEUMO1 AG UR QL IA: NEGATIVE
MCH RBC QN AUTO: 28.8 PG (ref 26.6–33)
MCHC RBC AUTO-ENTMCNC: 33.4 G/DL (ref 31.5–35.7)
MCV RBC AUTO: 86.1 FL (ref 79–97)
PLATELET # BLD AUTO: 226 10*3/MM3 (ref 140–450)
PMV BLD AUTO: 9.8 FL (ref 6–12)
POTASSIUM SERPL-SCNC: 3.6 MMOL/L (ref 3.5–5.2)
RBC # BLD AUTO: 3.89 10*6/MM3 (ref 3.77–5.28)
S PNEUM AG SPEC QL LA: NEGATIVE
SODIUM SERPL-SCNC: 133 MMOL/L (ref 136–145)
WBC NRBC COR # BLD AUTO: 11.64 10*3/MM3 (ref 3.4–10.8)

## 2024-09-06 PROCEDURE — 97162 PT EVAL MOD COMPLEX 30 MIN: CPT

## 2024-09-06 PROCEDURE — 94761 N-INVAS EAR/PLS OXIMETRY MLT: CPT

## 2024-09-06 PROCEDURE — 87449 NOS EACH ORGANISM AG IA: CPT | Performed by: HOSPITALIST

## 2024-09-06 PROCEDURE — 94660 CPAP INITIATION&MGMT: CPT

## 2024-09-06 PROCEDURE — 25010000002 FUROSEMIDE PER 20 MG: Performed by: HOSPITALIST

## 2024-09-06 PROCEDURE — 25010000002 ENOXAPARIN PER 10 MG: Performed by: INTERNAL MEDICINE

## 2024-09-06 PROCEDURE — 87899 AGENT NOS ASSAY W/OPTIC: CPT | Performed by: HOSPITALIST

## 2024-09-06 PROCEDURE — 94799 UNLISTED PULMONARY SVC/PX: CPT

## 2024-09-06 PROCEDURE — 99223 1ST HOSP IP/OBS HIGH 75: CPT | Performed by: INTERNAL MEDICINE

## 2024-09-06 PROCEDURE — 25010000002 CEFTRIAXONE PER 250 MG: Performed by: PHYSICIAN ASSISTANT

## 2024-09-06 PROCEDURE — 25010000002 CEFTRIAXONE PER 250 MG: Performed by: HOSPITALIST

## 2024-09-06 PROCEDURE — 94640 AIRWAY INHALATION TREATMENT: CPT

## 2024-09-06 PROCEDURE — 94664 DEMO&/EVAL PT USE INHALER: CPT

## 2024-09-06 PROCEDURE — 92610 EVALUATE SWALLOWING FUNCTION: CPT

## 2024-09-06 PROCEDURE — 80048 BASIC METABOLIC PNL TOTAL CA: CPT | Performed by: INTERNAL MEDICINE

## 2024-09-06 PROCEDURE — 85027 COMPLETE CBC AUTOMATED: CPT | Performed by: INTERNAL MEDICINE

## 2024-09-06 RX ORDER — ALBUTEROL SULFATE 0.83 MG/ML
2.5 SOLUTION RESPIRATORY (INHALATION) EVERY 6 HOURS PRN
Status: DISCONTINUED | OUTPATIENT
Start: 2024-09-06 | End: 2024-09-09 | Stop reason: HOSPADM

## 2024-09-06 RX ORDER — ACETAMINOPHEN 325 MG/1
650 TABLET ORAL EVERY 6 HOURS PRN
Status: DISCONTINUED | OUTPATIENT
Start: 2024-09-06 | End: 2024-09-09 | Stop reason: HOSPADM

## 2024-09-06 RX ORDER — ENOXAPARIN SODIUM 100 MG/ML
40 INJECTION SUBCUTANEOUS DAILY
Status: DISCONTINUED | OUTPATIENT
Start: 2024-09-06 | End: 2024-09-09 | Stop reason: HOSPADM

## 2024-09-06 RX ORDER — SODIUM CHLORIDE 0.9 % (FLUSH) 0.9 %
10 SYRINGE (ML) INJECTION EVERY 12 HOURS SCHEDULED
Status: DISCONTINUED | OUTPATIENT
Start: 2024-09-06 | End: 2024-09-09 | Stop reason: HOSPADM

## 2024-09-06 RX ORDER — IPRATROPIUM BROMIDE AND ALBUTEROL SULFATE 2.5; .5 MG/3ML; MG/3ML
3 SOLUTION RESPIRATORY (INHALATION) EVERY 4 HOURS PRN
Status: DISCONTINUED | OUTPATIENT
Start: 2024-09-06 | End: 2024-09-09 | Stop reason: HOSPADM

## 2024-09-06 RX ORDER — IPRATROPIUM BROMIDE AND ALBUTEROL SULFATE 2.5; .5 MG/3ML; MG/3ML
3 SOLUTION RESPIRATORY (INHALATION)
Status: DISCONTINUED | OUTPATIENT
Start: 2024-09-06 | End: 2024-09-09 | Stop reason: HOSPADM

## 2024-09-06 RX ORDER — ALPRAZOLAM 0.5 MG
0.5 TABLET ORAL NIGHTLY PRN
Status: DISCONTINUED | OUTPATIENT
Start: 2024-09-06 | End: 2024-09-09 | Stop reason: HOSPADM

## 2024-09-06 RX ORDER — LOSARTAN POTASSIUM 50 MG/1
100 TABLET ORAL
Status: DISCONTINUED | OUTPATIENT
Start: 2024-09-06 | End: 2024-09-09 | Stop reason: HOSPADM

## 2024-09-06 RX ORDER — POLYETHYLENE GLYCOL 3350 17 G/17G
17 POWDER, FOR SOLUTION ORAL DAILY PRN
Status: DISCONTINUED | OUTPATIENT
Start: 2024-09-06 | End: 2024-09-09 | Stop reason: HOSPADM

## 2024-09-06 RX ORDER — BISACODYL 10 MG
10 SUPPOSITORY, RECTAL RECTAL DAILY PRN
Status: DISCONTINUED | OUTPATIENT
Start: 2024-09-06 | End: 2024-09-09 | Stop reason: HOSPADM

## 2024-09-06 RX ORDER — BISACODYL 5 MG/1
5 TABLET, DELAYED RELEASE ORAL DAILY PRN
Status: DISCONTINUED | OUTPATIENT
Start: 2024-09-06 | End: 2024-09-09 | Stop reason: HOSPADM

## 2024-09-06 RX ORDER — FUROSEMIDE 10 MG/ML
40 INJECTION INTRAMUSCULAR; INTRAVENOUS ONCE
Status: COMPLETED | OUTPATIENT
Start: 2024-09-06 | End: 2024-09-06

## 2024-09-06 RX ORDER — AMLODIPINE BESYLATE 5 MG/1
5 TABLET ORAL
Status: DISCONTINUED | OUTPATIENT
Start: 2024-09-06 | End: 2024-09-09 | Stop reason: HOSPADM

## 2024-09-06 RX ORDER — NITROGLYCERIN 0.4 MG/1
0.4 TABLET SUBLINGUAL
Status: DISCONTINUED | OUTPATIENT
Start: 2024-09-06 | End: 2024-09-09 | Stop reason: HOSPADM

## 2024-09-06 RX ORDER — PROPRANOLOL HYDROCHLORIDE 20 MG/1
40 TABLET ORAL 2 TIMES DAILY
Status: DISCONTINUED | OUTPATIENT
Start: 2024-09-06 | End: 2024-09-09 | Stop reason: HOSPADM

## 2024-09-06 RX ORDER — AMOXICILLIN 250 MG
2 CAPSULE ORAL 2 TIMES DAILY PRN
Status: DISCONTINUED | OUTPATIENT
Start: 2024-09-06 | End: 2024-09-09 | Stop reason: HOSPADM

## 2024-09-06 RX ORDER — GUAIFENESIN AND DEXTROMETHORPHAN HYDROBROMIDE 600; 30 MG/1; MG/1
2 TABLET, EXTENDED RELEASE ORAL EVERY 12 HOURS SCHEDULED
Status: DISCONTINUED | OUTPATIENT
Start: 2024-09-06 | End: 2024-09-09 | Stop reason: HOSPADM

## 2024-09-06 RX ORDER — BUDESONIDE AND FORMOTEROL FUMARATE DIHYDRATE 160; 4.5 UG/1; UG/1
2 AEROSOL RESPIRATORY (INHALATION)
Status: DISCONTINUED | OUTPATIENT
Start: 2024-09-06 | End: 2024-09-09 | Stop reason: HOSPADM

## 2024-09-06 RX ORDER — SODIUM CHLORIDE 9 MG/ML
40 INJECTION, SOLUTION INTRAVENOUS AS NEEDED
Status: DISCONTINUED | OUTPATIENT
Start: 2024-09-06 | End: 2024-09-09 | Stop reason: HOSPADM

## 2024-09-06 RX ORDER — AZITHROMYCIN 250 MG/1
250 TABLET, FILM COATED ORAL
Status: DISCONTINUED | OUTPATIENT
Start: 2024-09-06 | End: 2024-09-09 | Stop reason: HOSPADM

## 2024-09-06 RX ORDER — SODIUM CHLORIDE 0.9 % (FLUSH) 0.9 %
10 SYRINGE (ML) INJECTION AS NEEDED
Status: DISCONTINUED | OUTPATIENT
Start: 2024-09-06 | End: 2024-09-09 | Stop reason: HOSPADM

## 2024-09-06 RX ADMIN — DOXYCYCLINE 100 MG: 100 INJECTION, POWDER, LYOPHILIZED, FOR SOLUTION INTRAVENOUS at 00:49

## 2024-09-06 RX ADMIN — ACETAMINOPHEN 650 MG: 325 TABLET ORAL at 05:09

## 2024-09-06 RX ADMIN — SODIUM CHLORIDE 2000 MG: 900 INJECTION INTRAVENOUS at 00:13

## 2024-09-06 RX ADMIN — AZITHROMYCIN DIHYDRATE 250 MG: 250 TABLET ORAL at 10:01

## 2024-09-06 RX ADMIN — FUROSEMIDE 40 MG: 10 INJECTION, SOLUTION INTRAMUSCULAR; INTRAVENOUS at 13:03

## 2024-09-06 RX ADMIN — LOSARTAN POTASSIUM 100 MG: 50 TABLET, FILM COATED ORAL at 10:02

## 2024-09-06 RX ADMIN — SODIUM CHLORIDE 2000 MG: 900 INJECTION INTRAVENOUS at 17:31

## 2024-09-06 RX ADMIN — Medication 10 ML: at 01:52

## 2024-09-06 RX ADMIN — Medication 10 ML: at 20:36

## 2024-09-06 RX ADMIN — IPRATROPIUM BROMIDE AND ALBUTEROL SULFATE 3 ML: 2.5; .5 SOLUTION RESPIRATORY (INHALATION) at 17:06

## 2024-09-06 RX ADMIN — IPRATROPIUM BROMIDE AND ALBUTEROL SULFATE 3 ML: 2.5; .5 SOLUTION RESPIRATORY (INHALATION) at 13:57

## 2024-09-06 RX ADMIN — ENOXAPARIN SODIUM 40 MG: 100 INJECTION SUBCUTANEOUS at 10:03

## 2024-09-06 RX ADMIN — IPRATROPIUM BROMIDE AND ALBUTEROL SULFATE 3 ML: 2.5; .5 SOLUTION RESPIRATORY (INHALATION) at 08:31

## 2024-09-06 RX ADMIN — Medication 10 ML: at 10:04

## 2024-09-06 RX ADMIN — GUAIFENESIN AND DEXTROMETHORPHAN HYDROBROMIDE 2 TABLET: 600; 30 TABLET, EXTENDED RELEASE ORAL at 20:36

## 2024-09-06 RX ADMIN — IPRATROPIUM BROMIDE AND ALBUTEROL SULFATE 3 ML: 2.5; .5 SOLUTION RESPIRATORY (INHALATION) at 21:15

## 2024-09-06 RX ADMIN — BUDESONIDE AND FORMOTEROL FUMARATE DIHYDRATE 2 PUFF: 160; 4.5 AEROSOL RESPIRATORY (INHALATION) at 08:31

## 2024-09-06 RX ADMIN — GUAIFENESIN AND DEXTROMETHORPHAN HYDROBROMIDE 2 TABLET: 600; 30 TABLET, EXTENDED RELEASE ORAL at 10:02

## 2024-09-06 RX ADMIN — PROPRANOLOL HYDROCHLORIDE 40 MG: 20 TABLET ORAL at 20:36

## 2024-09-06 RX ADMIN — ACETAMINOPHEN 650 MG: 325 TABLET ORAL at 20:36

## 2024-09-06 RX ADMIN — BUDESONIDE AND FORMOTEROL FUMARATE DIHYDRATE 2 PUFF: 160; 4.5 AEROSOL RESPIRATORY (INHALATION) at 21:15

## 2024-09-06 NOTE — H&P
[facility]  HISTORY AND PHYSICAL    Patient Name: Beth Fregoso  : 1935  MRN: [id number]  Primary Care Physician: Tripp Linder MD  Date of admission: 2024      Subjective   Subjective     Chief Complaint:  dyspnea      HPI:  Beth Fregoso is a 88 y.o. female with history of  mild COPD, HTN, remote breast cancer.  She has a weeklong history of dyspnea and cough.  She typically does not use her rescue inhaler at all but has been using it a lot lately.  She saw PCP 2 days ago and was prescribed antibiotics and steroids for her dyspnea but has continued to worsen.  At home she noted her oxygen sat was 81% so she came to ED.  On 2L oxygen, she is now satting 94%.      CXR shows bibasilar patchy infiltrate, worse on R.  She has only a 20 pkyear smoking history, takes a LABA/ICS daily but says she isn't sure if she has COPD or not.  At baseline she has no respiratory symptoms.      Her ankles are a bit swollen.  She has never been diagnosed with CHF and has no cardiac history .      Personal History     Past Medical History:   Diagnosis Date    Breast cancer 2014    Left     Hx of radiation therapy 2014    Hypertension            Past Surgical History:   Procedure Laterality Date    BREAST BIOPSY Left 2014    BREAST LUMPECTOMY Left 2014    REDUCTION MAMMAPLASTY         Family History: family history is not on file.     Social History:  reports that she has never smoked. She has never used smokeless tobacco. She reports current alcohol use. She reports that she does not use drugs.  Social History     Social History Narrative    Not on file       Medications:  Available home medication information reviewed.  ALPRAZolam, Calcium, Calcium Carbonate-Vit D-Min, Fluticasone Furoate-Vilanterol, Folic Acid, Glycerin-Hypromellose-, Vitamin D, albuterol sulfate HFA, amLODIPine, cephalexin, doxazosin, doxycycline, hydroCHLOROthiazide, meclizine,  mometasone-formoterol, multivitamin, multivitamin with minerals, olmesartan, predniSONE, and propranolol    Allergies   Allergen Reactions    Amoxicillin Rash       Objective   Objective     Vital Signs:   Temp:  [98.9 °F (37.2 °C)] 98.9 °F (37.2 °C)  Heart Rate:  [75-77] 75  Resp:  [16] 16  BP: (143-172)/(73-74) 143/73  Flow (L/min):  [2] 2       Physical Exam   Gen:  WD/WN woman coughing incessantly, breathing otherwise not labored.  RN in room.    Neuro: alert and oriented, clear speech, follows commands, grossly nonfocal  HEENT:  NC/AT   Neck:  Supple, no LAD  Heart RRR no murmur,   Lungs crackles.coarse sounds at bases.  No wheeze.    Abd:  Soft, nontender, no rebound or guarding, pos BS  Extrem:  No c/c trace edema       Result Review:  I have personally reviewed the results from the time of this admission to 9/6/2024 01:16 EDT and agree with these findings:  [x]  Laboratory list / accordion  []  Microbiology  [x]  Radiology  []  EKG/Telemetry   [x]  Cardiology/Vascular   []  Pathology  []  Old records  []  Other:  Most notable findings include: WBC 14,  probnp 5000, Na 127.  CXR bibaselir infiltrates       LAB RESULTS:      Lab 09/05/24 1959   WBC 13.73*   HEMOGLOBIN 11.8*   HEMATOCRIT 34.6   PLATELETS 212   NEUTROS ABS 10.98*   IMMATURE GRANS (ABS) 0.05   LYMPHS ABS 1.22   MONOS ABS 1.40*   EOS ABS 0.06   MCV 86.1   PROCALCITONIN 0.10   LACTATE 0.9         Lab 09/05/24 1959   SODIUM 127*   POTASSIUM 4.4   CHLORIDE 90*   CO2 27.0   ANION GAP 10.0   BUN 14   CREATININE 0.57   EGFR 87.5   GLUCOSE 112*   CALCIUM 8.7         Lab 09/05/24 1959   TOTAL PROTEIN 6.1   ALBUMIN 3.6   GLOBULIN 2.5   ALT (SGPT) 8   AST (SGOT) 8   BILIRUBIN 0.8   ALK PHOS 68         Lab 09/05/24 1959   PROBNP 2,397.0*   HSTROP T 12                 Lab 09/05/24  2314   PH, ARTERIAL 7.504*   PCO2, ARTERIAL 38.1   PO2 ART 58.8*   FIO2 28   HCO3 ART 30.0*   BASE EXCESS ART 6.5*   CARBOXYHEMOGLOBIN 1.6         Microbiology Results (last 10  days)       Procedure Component Value - Date/Time    Respiratory Panel PCR w/COVID-19(SARS-CoV-2) [facility]/N[facility]OL[facility] In-House, NP Swab in UTM/VTM, 2 HR TAT - Swab, Nasopharynx [[id number]]  (Abnormal) Collected: 09/05/24 2218    Lab Status: Final result Specimen: Swab from Nasopharynx Updated: 09/05/24 2320     ADENOVIRUS, PCR Not Detected     Coronavirus 229E Not Detected     Coronavirus HKU1 Not Detected     Coronavirus NL63 Not Detected     Coronavirus OC43 Not Detected     COVID19 Not Detected     Human Metapneumovirus Not Detected     Human Rhinovirus/Enterovirus Detected     Influenza A PCR Not Detected     Influenza B PCR Not Detected     Parainfluenza Virus 1 Not Detected     Parainfluenza Virus 2 Not Detected     Parainfluenza Virus 3 Not Detected     Parainfluenza Virus 4 Not Detected     RSV, PCR Not Detected     Bordetella pertussis pcr Not Detected     Bordetella parapertussis PCR Not Detected     Chlamydophila pneumoniae PCR Not Detected     Mycoplasma pneumo by PCR Not Detected    Narrative:      In the setting of a positive respiratory panel with a viral infection PLUS a negative procalcitonin without other underlying concern for bacterial infection, consider observing off antibiotics or discontinuation of antibiotics and continue supportive care. If the respiratory panel is positive for atypical bacterial infection (Bordetella pertussis, Chlamydophila pneumoniae, or Mycoplasma pneumoniae), consider antibiotic de-escalation to target atypical bacterial infection.            XR Chest 1 View    Result Date: 9/5/2024  XR CHEST 1 VW Date of Exam: 9/5/2024 7:35 PM EDT Indication: SOA triage protocol Comparison: Chest radiograph 5/8/2017 Findings: Mild cardiomegaly. Aortic atherosclerotic disease. Patchy bibasilar airspace opacities which may also involve the middle lobe and lingula. Blunted costophrenic angles consistent with small effusions. Probable peribronchial thickening. No overt edema or  pneumothorax. Degenerative related osseous changes.     Impression: Impression: Patchy multifocal airspace disease suspicious for pneumonia pneumonia or aspiration with small bilateral pleural effusions. Commend radiographic follow-up to resolution in 6 to 8 weeks. Electronically Signed: Korey Chanel MD  9/5/2024 7:57 PM EDT  Workstation ID: [id number]         Assessment & Plan   Assessment & Plan       Pneumonia    88 yr old woman with COPD on no home oxygen, here with 2 weeks progressive dyspnea    Bibasilar pna, r>L  Rhinovirus +   - abx - rhinovirus now positive but given 2 weeks of symptoms and CXR appearance, will treat for superinfection.  .    - consider also aspiration, check swallow eval   - nebs    Hyponatremia  - unclear if acute or chronic  - mildly volume overloaded.    - will not diurese tonight; recheck Na in morning.      Possible CHF   - Elev probnp  - small effusions  - check echo       VTE Prophylaxis:  No VTE prophylaxis order currently exists.          CODE STATUS:    There are no questions and answers to display.       Expected Discharge   Expected discharge date/ time has not been documented.     Jo Larkin MD  09/06/24

## 2024-09-06 NOTE — ED NOTES
" Beth Fregoso    Nursing Report ED to Floor:  Mental status: alert and oriented x4  Ambulatory status: sb, sometimes uses waker at home  Oxygen Therapy:  2L nasal canula  Cardiac Rhythm: normal sinus with PVC's  Admitted from: ed/home  Safety Concerns:  fall risk  Social Issues: na  ED Room #:  16    ED Nurse Phone Extension - [phone] or may call [phone].      HPI:   Chief Complaint   Patient presents with    Shortness of Breath       Past Medical History:  Past Medical History:   Diagnosis Date    Breast cancer 2014    Left     Hx of radiation therapy 2014    Hypertension         Past Surgical History:  Past Surgical History:   Procedure Laterality Date    BREAST BIOPSY Left 2014    BREAST LUMPECTOMY Left 2014    REDUCTION MAMMAPLASTY          Admitting Doctor:   No admitting provider for patient encounter.    Consulting Provider(s):  Consults       No orders found for last 30 day(s).             Admitting Diagnosis:   The encounter diagnosis was Pneumonia of right middle lobe due to infectious organism.    Most Recent Vitals:   Vitals:    09/05/24 1847 09/05/24 2304 09/05/24 2340 09/06/24 0001   BP: 169/74 172/74 143/73    BP Location: Left arm      Patient Position: Sitting      Pulse: 77  75    Resp: 16      Temp: 98.9 °F (37.2 °C)      TempSrc: Oral      SpO2: 93%  94% 94%   Weight: 77.6 kg (171 lb)      Height: 154.9 cm (61\")          Active LDAs/IV Access:   Lines, Drains & Airways       Active LDAs       None                    Labs (abnormal labs have a star):   Labs Reviewed   RESPIRATORY PANEL PCR W/ COVID-19 (SARS-COV-2), NP SWAB IN UTM/VTP, 2 HR TAT - Abnormal; Notable for the following components:       Result Value    Human Rhinovirus/Enterovirus Detected (*)     All other components within normal limits    Narrative:     In the setting of a positive respiratory panel with a viral infection PLUS a negative procalcitonin without other underlying concern for bacterial infection, consider observing off " antibiotics or discontinuation of antibiotics and continue supportive care. If the respiratory panel is positive for atypical bacterial infection (Bordetella pertussis, Chlamydophila pneumoniae, or Mycoplasma pneumoniae), consider antibiotic de-escalation to target atypical bacterial infection.   COMPREHENSIVE METABOLIC PANEL - Abnormal; Notable for the following components:    Glucose 112 (*)     Sodium 127 (*)     Chloride 90 (*)     All other components within normal limits    Narrative:     GFR Normal >60  Chronic Kidney Disease <60  Kidney Failure <15    The GFR formula is only valid for adults with stable renal function between ages 18 and 70.   BNP (IN-HOUSE) - Abnormal; Notable for the following components:    proBNP 2,397.0 (*)     All other components within normal limits    Narrative:     This assay is used as an aid in the diagnosis of individuals suspected of having heart failure. It can be used as an aid in the diagnosis of acute decompensated heart failure (ADHF) in patients presenting with signs and symptoms of ADHF to the emergency department (ED). In addition, NT-proBNP of <300 pg/mL indicates ADHF is not likely.    Age Range Result Interpretation  NT-proBNP Concentration (pg/mL:      <50             Positive            >450                   Gray                 300-450                    Negative             <300    50-75           Positive            >900                  Gray                300-900                  Negative            <300      >75             Positive            >1800                  Gray                300-1800                  Negative            <300   CBC WITH AUTO DIFFERENTIAL - Abnormal; Notable for the following components:    WBC 13.73 (*)     Hemoglobin 11.8 (*)     Neutrophil % 80.0 (*)     Lymphocyte % 8.9 (*)     Neutrophils, Absolute 10.98 (*)     Monocytes, Absolute 1.40 (*)     All other components within normal limits   BLOOD GAS, ARTERIAL W/CO-OXIMETRY -  "Abnormal; Notable for the following components:    pH, Arterial 7.504 (*)     pO2, Arterial 58.8 (*)     HCO3, Arterial 30.0 (*)     Base Excess, Arterial 6.5 (*)     Hemoglobin, Blood Gas 12.2 (*)     Hematocrit, Blood Gas 37.5 (*)     Oxyhemoglobin 91.2 (*)     pO2, Temperature Corrected 58.8 (*)     All other components within normal limits   SINGLE HS TROPONIN T - Normal    Narrative:     High Sensitive Troponin T Reference Range:  <14.0 ng/L- Negative Female for AMI  <22.0 ng/L- Negative Male for AMI  >=14 - Abnormal Female indicating possible myocardial injury.  >=22 - Abnormal Male indicating possible myocardial injury.   Clinicians would have to utilize clinical acumen, EKG, Troponin, and serial changes to determine if it is an Acute Myocardial Infarction or myocardial injury due to an underlying chronic condition.        LACTIC ACID, PLASMA - Normal   PROCALCITONIN - Normal    Narrative:     As a Marker for Sepsis (Non-Neonates):    1. <0.5 ng/mL represents a low risk of severe sepsis and/or septic shock.  2. >2 ng/mL represents a high risk of severe sepsis and/or septic shock.    As a Marker for Lower Respiratory Tract Infections that require antibiotic therapy:    PCT on Admission    Antibiotic Therapy       6-12 Hrs later    >0.5                Strongly Recommended  >0.25 - <0.5        Recommended   0.1 - 0.25          Discouraged              Remeasure/reassess PCT  <0.1                Strongly Discouraged     Remeasure/reassess PCT    As 28 day mortality risk marker: \"Change in Procalcitonin Result\" (>80% or <=80%) if Day 0 (or Day 1) and Day 4 values are available. Refer to http://www.[facility]-pct-calculator.com    Change in PCT <=80%  A decrease of PCT levels below or equal to 80% defines a positive change in PCT test result representing a higher risk for 28-day all-cause mortality of patients diagnosed with severe sepsis for septic shock.    Change in PCT >80%  A decrease of PCT levels of more than " 80% defines a negative change in PCT result representing a lower risk for 28-day all-cause mortality of patients diagnosed with severe sepsis or septic shock.      BLOOD CULTURE   BLOOD CULTURE   RAINBOW DRAW    Narrative:     The following orders were created for panel order [place] Draw.  Procedure                               Abnormality         Status                     ---------                               -----------         ------                     Green Top (Gel)[[id number]]                                  Final result               Lavender Top[[id number]]                                     Final result               Gold Top - SST[[id number]]                                   Final result               Moore Top[[id number]]                                         Final result               Light Blue Top[[id number]]                                   Final result                 Please view results for these tests on the individual orders.   BLOOD GAS, ARTERIAL   CBC AND DIFFERENTIAL    Narrative:     The following orders were created for panel order CBC & Differential.  Procedure                               Abnormality         Status                     ---------                               -----------         ------                     CBC Auto Differential[[id number]]        Abnormal            Final result                 Please view results for these tests on the individual orders.   GREEN TOP   LAVENDER TOP   GOLD TOP - SST   GRAY TOP   LIGHT BLUE TOP       Meds Given in ED:   Medications   sodium chloride 0.9 % flush 10 mL (has no administration in time range)   cefTRIAXone (ROCEPHIN) 2,000 mg in sodium chloride 0.9 % 100 mL MBP (has no administration in time range)   doxycycline (VIBRAMYCIN) 100 mg in sodium chloride 0.9 % 100 mL MBP (has no administration in time range)           Last NIH score:                                                          Dysphagia screening results:         Roselyn Coma Scale:  No data recorded     CIWA:        Restraint Type:            Isolation Status:  No active isolations

## 2024-09-06 NOTE — PLAN OF CARE
Goal Outcome Evaluation:  Plan of Care Reviewed With: (P) patient           Outcome Evaluation: (P) Patient was able to ambulate 100 ft with FWW and stand by assist using 3L O2 via nasal canula. Patient limited her walking to SOA. Patient states that she typically uses a rollator and could be assessed with that at future sessions. Patient will benefit from skilled PT in order to improve LE strength and activity tolerance. Recommended discharge is home with assist and HHPT.      Anticipated Discharge Disposition (PT): (P) home with assist, home with home health

## 2024-09-06 NOTE — THERAPY EVALUATION
Patient Name: Beth Fregoso  : 1935    MRN: [id number]                              Today's Date: 2024       Admit Date: 2024    Visit Dx:     ICD-10-CM ICD-9-CM   1. Pneumonia of right middle lobe due to infectious organism  J18.9 486     Patient Active Problem List   Diagnosis    Pneumonia     Past Medical History:   Diagnosis Date    Breast cancer 2014    Left     Hx of radiation therapy 2014    Hypertension      Past Surgical History:   Procedure Laterality Date    BREAST BIOPSY Left 2014    BREAST LUMPECTOMY Left 2014    REDUCTION MAMMAPLASTY        General Information       Row Name 24 [facility]          Physical Therapy Time and Intention    Document Type evaluation (P)   -LE     Mode of Treatment physical therapy (P)   -[facility] Name 24          General Information    Patient Profile Reviewed yes (P)   -LE     Prior Level of Function independent:;all household mobility;gait;ADL's;feeding;grooming;dressing;home management (P)   -LE     Existing Precautions/Restrictions oxygen therapy device and L/min (P)   -LE     Barriers to Rehab medically complex;previous functional deficit (P)   -LE       Row Name 24          Living Environment    People in Home alone (P)   -LE       Row Name 24          Home Main Entrance    Number of Stairs, Main Entrance none (P)   -LE       Row Name 24          Stairs Within Home, Primary    Number of Stairs, Within Home, Primary none (P)   -LE       Row Name 24          Cognition    Orientation Status (Cognition) oriented x 3 (P)   -LE       Row Name 24          Safety Issues, Functional Mobility    Safety Issues Affecting Function (Mobility) awareness of need for assistance;safety precaution awareness (P)   -LE     Impairments Affecting Function (Mobility) balance;shortness of breath;strength;endurance/activity tolerance (P)   -LE               User Key  (r) = Recorded By, (t) = Taken By, (c) =  Cosigned By      Initials Name Provider Type    Cezar Hua, PT Student PT Student                   Mobility       Row Name 09/06/24 0909          Bed Mobility    Assistive Device (Bed Mobility) bed rails;head of bed elevated (P)   -LE       Row Name 09/06/24 0909          Sit-Stand Transfer    Sit-Stand [place] (Transfers) standby assist (P)   -LE     Assistive Device (Sit-Stand Transfers) walker, front-wheeled (P)   -LE       Row Name 09/06/24 0909          Gait/Stairs (Locomotion)    [place] Level (Gait) standby assist (P)   -LE     Assistive Device (Gait) walker, front-wheeled (P)   -LE     Patient was able to Ambulate yes (P)   -LE     Distance in Feet (Gait) 100 (P)   -LE     Deviations/Abnormal Patterns (Gait) antoinette decreased;gait speed decreased;stride length decreased;base of support, narrow (P)   -LE     Bilateral Gait Deviations forward flexed posture (P)   -LE     Comment, (Gait/Stairs) Pt was able to ambulate 100 ft today with FWW and stand by assist while remaining on 3L O2 via nasal canula. Pt was limited with walking today by SOA and needed to break once she got back to her room. (P)   -LE               User Key  (r) = Recorded By, (t) = Taken By, (c) = Cosigned By      Initials Name Provider Type    Cezar Hua, PT Student PT Student                   Obj/Interventions       Row Name 09/06/24 0914          Range of Motion Comprehensive    General Range of Motion no range of motion deficits identified (P)   -LE       Row Name 09/06/24 0914          Strength Comprehensive (MMT)    General Manual Muscle Testing (MMT) Assessment lower extremity strength deficits identified (P)   -LE     Comment, General Manual Muscle Testing (MMT) Assessment BLE grossly 4/5 (P)   -LE       Row Name 09/06/24 0914          Balance    Balance Assessment sitting static balance;sitting dynamic balance;sit to stand dynamic balance;standing static balance;standing dynamic balance (P)   -LE     Static  Sitting Balance independent (P)   -LE     Dynamic Sitting Balance standby assist (P)   -LE     Position, Sitting Balance unsupported;sitting edge of bed (P)   -LE     Sit to Stand Dynamic Balance standby assist (P)   -LE     Static Standing Balance standby assist (P)   -LE     Dynamic Standing Balance standby assist (P)   -LE     Position/Device Used, Standing Balance supported;walker, front-wheeled (P)   -LE     Balance Interventions sitting;standing;sit to stand;static;supported;dynamic;occupation based/functional task (P)   -LE       Row Name 09/06/24 0914          Sensory Assessment (Somatosensory)    Sensory Assessment (Somatosensory) LE sensation intact (P)   -LE               User Key  (r) = Recorded By, (t) = Taken By, (c) = Cosigned By      Initials Name Provider Type    Cezar Hua, PT Student PT Student                   Goals/Plan       Row Name 09/06/24 0917          Bed Mobility Goal 1 (PT)    Activity/Assistive Device (Bed Mobility Goal 1, PT) sit to supine/supine to sit (P)   -LE     [place] Level/Cues Needed (Bed Mobility Goal 1, PT) independent (P)   -LE     Time Frame (Bed Mobility Goal 1, PT) short term goal (STG);5 days (P)   -LE     Progress/Outcomes (Bed Mobility Goal 1, PT) new goal (P)   -LE       Row Name 09/06/24 0917          Transfer Goal 1 (PT)    Activity/Assistive Device (Transfer Goal 1, PT) bed-to-chair/chair-to-bed;other (see comments) (P)   FWW or rollator  -LE     [place] Level/Cues Needed (Transfer Goal 1, PT) modified independence (P)   -LE     Time Frame (Transfer Goal 1, PT) long term goal (LTG);10 days (P)   -LE     Progress/Outcome (Transfer Goal 1, PT) new goal (P)   -LE       Row Name 09/06/24 0917          Gait Training Goal 1 (PT)    Activity/Assistive Device (Gait Training Goal 1, PT) gait (walking locomotion);assistive device use;improve balance and speed;increase endurance/gait distance;other (see comments) (P)   FWW or rollator  -LE     [place]  Level (Gait Training Goal 1, PT) modified independence (P)   -LE     Distance (Gait Training Goal 1, PT) 350 (P)   -LE     Time Frame (Gait Training Goal 1, PT) long term goal (LTG);10 days (P)   -LE     Progress/Outcome (Gait Training Goal 1, PT) new goal (P)   -LE       Row Name 09/06/24 0917          Therapy Assessment/Plan (PT)    Planned Therapy Interventions (PT) balance training;bed mobility training;gait training;home exercise program;neuromuscular re-education;patient/family education;postural re-education;stair training;strengthening;transfer training (P)   -LE               User Key  (r) = Recorded By, (t) = Taken By, (c) = Cosigned By      Initials Name Provider Type    Cezar Hua, PT Student PT Student                   Clinical Impression       Row Name 09/06/24 0915          Pain    Pretreatment Pain Rating 0/10 - no pain (P)   -LE     Posttreatment Pain Rating 0/10 - no pain (P)   -LE       Row Name 09/06/24 0915          Plan of Care Review    Plan of Care Reviewed With patient (P)   -LE     Outcome Evaluation Patient was able to ambulate 100 ft with FWW and stand by assist using 3L O2 via nasal canula. Patient limited her walking to SOA. Patient states that she typically uses a rollator and could be assessed with that at future sessions. Patient will benefit from skilled PT in order to improve LE strength and activity tolerance. Recommended discharge is home with assist and HHPT. (P)   -LE       Row Name 09/06/24 0915          Therapy Assessment/Plan (PT)    Rehab Potential (PT) good, to achieve stated therapy goals (P)   -LE     Criteria for Skilled Interventions Met (PT) yes;meets criteria;skilled treatment is necessary (P)   -LE     Therapy Frequency (PT) daily (P)   -LE     Predicted Duration of Therapy Intervention (PT) 10 days (P)   -LE       Row Name 09/06/24 0915          Vital Signs    Pretreatment Heart Rate (beats/min) 96 (P)   -LE     Posttreatment Heart Rate (beats/min) 100 (P)    -LE     Pre SpO2 (%) 96 (P)   -LE     O2 Delivery Pre Treatment nasal cannula (P)   -LE     Post SpO2 (%) 92 (P)   -LE     O2 Delivery Post Treatment nasal cannula (P)   -LE     Pre Patient Position Supine (P)   -LE     Post Patient Position Sitting (P)   -LE       Row Name 09/06/24 0915          Positioning and Restraints    Pre-Treatment Position in bed (P)   -LE     Post Treatment Position chair (P)   -LE     In Chair notified nsg;reclined;call light within reach;encouraged to call for assist;waffle cushion;legs elevated (P)   -LE               User Key  (r) = Recorded By, (t) = Taken By, (c) = Cosigned By      Initials Name Provider Type    Cezar Hua, PT Student PT Student                   Outcome Measures       Row Name 09/06/24 0943 09/06/24 0122       How much help from another person do you currently need...    Turning from your back to your side while in flat bed without using bedrails? 4 (P)   -LE 3  -MB    Moving from lying on back to sitting on the side of a flat bed without bedrails? 4 (P)   -LE 3  -MB    Moving to and from a bed to a chair (including a wheelchair)? 3 (P)   -LE 4  -MB    Standing up from a chair using your arms (e.g., wheelchair, bedside chair)? 4 (P)   -LE 4  -MB    Climbing 3-5 steps with a railing? 3 (P)   -LE 3  -MB    To walk in hospital room? 3 (P)   -LE 4  -MB    AM-PAC 6 Clicks Score (PT) 21 (P)   -LE 21  -MB    Highest Level of Mobility Goal 6 --> Walk 10 steps or more (P)   -LE 6 --> Walk 10 steps or more  -MB      Row Name 09/06/24 0943          Functional Assessment    Outcome Measure Options AM-PAC 6 Clicks Basic Mobility (PT) (P)   -LE               User Key  (r) = Recorded By, (t) = Taken By, (c) = Cosigned By      Initials Name Provider Type    Mayelin Barbour, RN Registered Nurse    Cezar Hua, PT Student PT Student                                 Physical Therapy Education       Title: PT OT SLP Therapies (In Progress)       Topic: Physical  Therapy (In Progress)       Point: Mobility training (Done)       Learning Progress Summary             Patient Acceptance, E, VU by LE at 9/6/2024 0944                         Point: Home exercise program (Not Started)       Learner Progress:  Not documented in this visit.              Point: Body mechanics (Done)       Learning Progress Summary             Patient Acceptance, E, VU by LE at 9/6/2024 0944                         Point: Precautions (Done)       Learning Progress Summary             Patient Acceptance, E, VU by LE at 9/6/2024 0944                                         User Key       Initials Effective Dates Name Provider Type Discipline    HEYDI 07/30/24 -  Cezar Paulino, PT Student PT Student PT                  PT Recommendation and Plan  Planned Therapy Interventions (PT): (P) balance training, bed mobility training, gait training, home exercise program, neuromuscular re-education, patient/family education, postural re-education, stair training, strengthening, transfer training  Plan of Care Reviewed With: (P) patient  Outcome Evaluation: (P) Patient was able to ambulate 100 ft with FWW and stand by assist using 3L O2 via nasal canula. Patient limited her walking to SOA. Patient states that she typically uses a rollator and could be assessed with that at future sessions. Patient will benefit from skilled PT in order to improve LE strength and activity tolerance. Recommended discharge is home with assist and HHPT.     Time Calculation:   PT Evaluation Complexity  History, PT Evaluation Complexity: (P) 1-2 personal factors and/or comorbidities  Examination of Body Systems (PT Eval Complexity): (P) total of 3 or more elements  Clinical Presentation (PT Evaluation Complexity): (P) evolving  Clinical Decision Making (PT Evaluation Complexity): (P) moderate complexity  Overall Complexity (PT Evaluation Complexity): (P) moderate complexity     PT Charges       Row Name 09/06/24 0945             Time  Calculation    Start Time 0828 (P)   -LE      PT Received On 09/06/24 (P)   -LE      PT Goal Re-Cert Due Date 09/16/24 (P)   -LE         Untimed Charges    PT Eval/Re-eval Minutes 51 (P)   -LE         Total Minutes    Untimed Charges Total Minutes 51 (P)   -LE       Total Minutes 51 (P)   -LE                User Key  (r) = Recorded By, (t) = Taken By, (c) = Cosigned By      Initials Name Provider Type    Cezar Hua, PT Student PT Student                  Therapy Charges for Today       Code Description Service Date Service Provider Modifiers Qty    [id number] HC PT EVAL MOD COMPLEXITY 4 9/6/2024 Cezar Paulino, PT Student GP 1            PT G-Codes  Outcome Measure Options: (P) AM-PAC 6 Clicks Basic Mobility (PT)  AM-PAC 6 Clicks Score (PT): (P) 21  PT Discharge Summary  Anticipated Discharge Disposition (PT): (P) home with assist, home with home health    Cezar Paulino PT Student  9/6/2024

## 2024-09-06 NOTE — PLAN OF CARE
Goal Outcome Evaluation:                        PT a&ox4. 2L NC. VSS. No complaints of pain or SOA. Currently in the bed with alarm set and call light in reach. POC ongoing.
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
## 2024-09-06 NOTE — PLAN OF CARE
Goal Outcome Evaluation:  Plan of Care Reviewed With: patient                             SLP Swallowing Diagnosis: swallow WFL/no suspected pharyngeal impairment (09/06/24 0910)

## 2024-09-06 NOTE — THERAPY EVALUATION
Acute Care - Speech Language Pathology   Swallow Initial Evaluation [facility]  Clinical Swallow Evaluation       Patient Name: Beth Fregoso  : 1935  MRN: [id number]  Today's Date: 2024               Admit Date: 2024    Visit Dx:     ICD-10-CM ICD-9-CM   1. Pneumonia of right middle lobe due to infectious organism  J18.9 486     Patient Active Problem List   Diagnosis    Pneumonia     Past Medical History:   Diagnosis Date    Breast cancer 2014    Left     Hx of radiation therapy 2014    Hypertension      Past Surgical History:   Procedure Laterality Date    BREAST BIOPSY Left 2014    BREAST LUMPECTOMY Left 2014    REDUCTION MAMMAPLASTY         SLP Recommendation and Plan  SLP Swallowing Diagnosis: swallow WFL/no suspected pharyngeal impairment (24)        SLP Rec. for Method of Medication Administration: meds whole, as tolerated (24)                    Therapy Frequency (Swallow): evaluation only (24)     Oral Care Recommendations: Oral Care BID/PRN, Toothbrush (24)                                        Plan of Care Reviewed With: patient      SWALLOW EVALUATION (Last 72 Hours)       SLP Adult Swallow Evaluation       Row Name 2415 24                Rehab Evaluation    Document Type evaluation  -MM evaluation  -MM       Subjective Information --  -MM no complaints;complains of  -MM       Patient Observations --  -MM alert;cooperative  -MM          General Information    Patient Profile Reviewed --  -MM yes  -MM       Pertinent History Of Current Problem --  -MM Patient is an 88 year old female who presented to the hospital with c/o SOA found to have pneumonia and rhinovirus. Pt has a history of COPD. Pt reports no history of difficulty swallowing and tolerates a regular and thin liquid diet at home.  -MM       Current Method of Nutrition -- regular textures;thin liquids  -MM       Precautions/Limitations, Vision -- WFL  -MM        Precautions/Limitations, Hearing -- WFL  -MM       Prior Level of Function-Communication -- WFL  -MM       Prior Level of Function-Swallowing -- no diet consistency restrictions;regular textures;thin liquids  -MM       Plans/Goals Discussed with -- patient  -MM       Barriers to Rehab -- none identified  -MM       Patient's Goals for Discharge -- return home  -MM          Pain Scale: Numbers Pre/Post-Treatment    Pretreatment Pain Rating -- 0/10 - no pain  -MM       Posttreatment Pain Rating -- 0/10 - no pain  -MM          Oral Motor Structure and Function    Dentition Assessment -- natural, present and adequate  -MM       Secretion Management -- WNL/WFL  -MM       Mucosal Quality -- moist, healthy  -MM       Gag Response -- WFL  -MM       Volitional Swallow -- WFL  -MM       Volitional Cough -- WFL  -MM          Oral Musculature and Cranial Nerve Assessment    Oral Motor General Assessment -- WFL  -MM          General Eating/Swallowing Observations    Eating/Swallowing Skills -- appropriate self-feeding skills observed  -MM       Positioning During Eating -- upright in chair  -MM       Utensils Used -- cup;spoon;straw  -MM       Consistencies Trialed -- regular textures;soft to chew textures;pureed;thin liquids  -MM          Clinical Swallow Eval    Oral Prep Phase -- WFL  -MM       Oral Transit -- WFL  -MM       Oral Residue -- WFL  -MM       Pharyngeal Phase -- no overt signs/symptoms of pharyngeal impairment  -MM       Esophageal Phase -- unremarkable  -MM          SLP Evaluation Clinical Impression    SLP Swallowing Diagnosis -- swallow WFL/no suspected pharyngeal impairment  -MM       Functional Impact -- no impact on function  -MM          Recommendations    Therapy Frequency (Swallow) -- evaluation only  -MM       Oral Care Recommendations -- Oral Care BID/PRN;Toothbrush  -MM       SLP Rec. for Method of Medication Administration -- meds whole;as tolerated  -MM                 User Key  (r) = Recorded By,  (t) = Taken By, (c) = Cosigned By      Initials Name Effective Dates    Yue Shanks MS CCC-SLP 08/30/24 -                     EDUCATION  The patient has been educated in the following areas:   Dysphagia (Swallowing Impairment).                Time Calculation:    Time Calculation- SLP       Row Name 09/06/24 1040             Time Calculation- SLP    SLP Start Time 0910  -MM      SLP Received On 09/06/24  -MM         Untimed Charges    SLP Eval/Re-eval  ST Eval Oral Pharyng Swallow - 00433  -MM      08363-NV Eval Oral Pharyng Swallow Minutes 57  -MM         Total Minutes    Untimed Charges Total Minutes 57  -MM       Total Minutes 57  -MM                User Key  (r) = Recorded By, (t) = Taken By, (c) = Cosigned By      Initials Name Provider Type    Yue Shanks MS CCC-SLP Speech and Language Pathologist                    Therapy Charges for Today       Code Description Service Date Service Provider Modifiers Qty    [id number] HC ST EVAL ORAL PHARYNG SWALLOW 4 9/6/2024 Yue Verma MS CCC-SLP GN 1                 Yue Verma MS CCC-GOYO  9/6/2024

## 2024-09-06 NOTE — PLAN OF CARE
Goal Outcome Evaluation:  Plan of Care Reviewed With: patient        Progress: no change  Outcome Evaluation: Pt is A&O with VSS, NSR on the monitor, and on 2L NC with a frequent productive cough. This morning her temp was 100.4 so I gave her PRN tylenol and it's now 98.7. There are no complaints at this time.

## 2024-09-07 ENCOUNTER — APPOINTMENT (OUTPATIENT)
Dept: CARDIOLOGY | Facility: HOSPITAL | Age: 89
DRG: 194 | End: 2024-09-07
Payer: MEDICARE

## 2024-09-07 ENCOUNTER — APPOINTMENT (OUTPATIENT)
Dept: CT IMAGING | Facility: HOSPITAL | Age: 89
DRG: 194 | End: 2024-09-07
Payer: MEDICARE

## 2024-09-07 LAB
ALBUMIN SERPL-MCNC: 3.1 G/DL (ref 3.5–5.2)
ALBUMIN/GLOB SERPL: 1.2 G/DL
ALP SERPL-CCNC: 60 U/L (ref 39–117)
ALT SERPL W P-5'-P-CCNC: 9 U/L (ref 1–33)
ANION GAP SERPL CALCULATED.3IONS-SCNC: 9 MMOL/L (ref 5–15)
ASCENDING AORTA: 4 CM
AST SERPL-CCNC: 12 U/L (ref 1–32)
BASOPHILS # BLD AUTO: 0.01 10*3/MM3 (ref 0–0.2)
BASOPHILS NFR BLD AUTO: 0.1 % (ref 0–1.5)
BH CV ECHO MEAS - AI P1/2T: 335.6 MSEC
BH CV ECHO MEAS - AO MAX PG: 29 MMHG
BH CV ECHO MEAS - AO MEAN PG: 18 MMHG
BH CV ECHO MEAS - AO ROOT AREA (BSA CORRECTED): 1.7 CM2
BH CV ECHO MEAS - AO ROOT DIAM: 3.1 CM
BH CV ECHO MEAS - AO V2 MAX: 237 CM/SEC
BH CV ECHO MEAS - AO V2 VTI: 60.8 CM
BH CV ECHO MEAS - AVA(I,D): 1.08 CM2
BH CV ECHO MEAS - EDV(CUBED): 148.9 ML
BH CV ECHO MEAS - EDV(MOD-SP2): 153 ML
BH CV ECHO MEAS - EDV(MOD-SP4): 148 ML
BH CV ECHO MEAS - EF(MOD-BP): 60.2 %
BH CV ECHO MEAS - EF(MOD-SP2): 65.2 %
BH CV ECHO MEAS - EF(MOD-SP4): 55.5 %
BH CV ECHO MEAS - ESV(CUBED): 32.8 ML
BH CV ECHO MEAS - ESV(MOD-SP2): 53.2 ML
BH CV ECHO MEAS - ESV(MOD-SP4): 65.9 ML
BH CV ECHO MEAS - FS: 39.6 %
BH CV ECHO MEAS - IVS/LVPW: 0.73 CM
BH CV ECHO MEAS - IVSD: 0.8 CM
BH CV ECHO MEAS - LA DIMENSION: 3.5 CM
BH CV ECHO MEAS - LAT PEAK E' VEL: 9.5 CM/SEC
BH CV ECHO MEAS - LV MASS(C)D: 187.3 GRAMS
BH CV ECHO MEAS - LV MAX PG: 4.6 MMHG
BH CV ECHO MEAS - LV MEAN PG: 2.5 MMHG
BH CV ECHO MEAS - LV V1 MAX: 107.5 CM/SEC
BH CV ECHO MEAS - LV V1 VTI: 28.9 CM
BH CV ECHO MEAS - LVIDD: 5.3 CM
BH CV ECHO MEAS - LVIDS: 3.2 CM
BH CV ECHO MEAS - LVOT AREA: 2.27 CM2
BH CV ECHO MEAS - LVOT DIAM: 1.7 CM
BH CV ECHO MEAS - LVPWD: 1.1 CM
BH CV ECHO MEAS - MED PEAK E' VEL: 10.2 CM/SEC
BH CV ECHO MEAS - MR MAX PG: 6.3 MMHG
BH CV ECHO MEAS - MR MAX VEL: 125 CM/SEC
BH CV ECHO MEAS - MR MEAN PG: 2 MMHG
BH CV ECHO MEAS - MR MEAN VEL: 66.5 CM/SEC
BH CV ECHO MEAS - MR VTI: 29.6 CM
BH CV ECHO MEAS - MV A MAX VEL: 112 CM/SEC
BH CV ECHO MEAS - MV DEC SLOPE: 880 CM/SEC2
BH CV ECHO MEAS - MV DEC TIME: 0.16 SEC
BH CV ECHO MEAS - MV E MAX VEL: 133 CM/SEC
BH CV ECHO MEAS - MV E/A: 1.19
BH CV ECHO MEAS - MV MAX PG: 100.8 MMHG
BH CV ECHO MEAS - MV MEAN PG: 61 MMHG
BH CV ECHO MEAS - MV P1/2T: 47.6 MSEC
BH CV ECHO MEAS - MV V2 VTI: 159 CM
BH CV ECHO MEAS - MVA(P1/2T): 4.6 CM2
BH CV ECHO MEAS - MVA(VTI): 0.41 CM2
BH CV ECHO MEAS - PA ACC TIME: 0.07 SEC
BH CV ECHO MEAS - PA V2 MAX: 119 CM/SEC
BH CV ECHO MEAS - RF(MV,LVOT)(1DIAM): 0.9 CM
BH CV ECHO MEAS - RVSP: 24 MMHG
BH CV ECHO MEAS - SV(LVOT): 65.5 ML
BH CV ECHO MEAS - SV(MOD-SP2): 99.8 ML
BH CV ECHO MEAS - SV(MOD-SP4): 82.1 ML
BH CV ECHO MEAS - TAPSE (>1.6): 2.25 CM
BH CV ECHO MEAS - TR MAX PG: 21.4 MMHG
BH CV ECHO MEAS - TR MAX VEL: 228.5 CM/SEC
BH CV ECHO MEASUREMENTS AVERAGE E/E' RATIO: 13.5
BH CV VAS BP LEFT ARM: NORMAL MMHG
BH CV XLRA - RV BASE: 3.5 CM
BH CV XLRA - RV LENGTH: 6.3 CM
BH CV XLRA - RV MID: 2.5 CM
BH CV XLRA - TDI S': 21.7 CM/SEC
BILIRUB SERPL-MCNC: 0.4 MG/DL (ref 0–1.2)
BUN SERPL-MCNC: 10 MG/DL (ref 8–23)
BUN/CREAT SERPL: 19.2 (ref 7–25)
CALCIUM SPEC-SCNC: 8.4 MG/DL (ref 8.6–10.5)
CHLORIDE SERPL-SCNC: 96 MMOL/L (ref 98–107)
CO2 SERPL-SCNC: 30 MMOL/L (ref 22–29)
CREAT SERPL-MCNC: 0.52 MG/DL (ref 0.57–1)
D DIMER PPP FEU-MCNC: 1.65 MCGFEU/ML (ref 0–0.88)
DEPRECATED RDW RBC AUTO: 41.3 FL (ref 37–54)
EGFRCR SERPLBLD CKD-EPI 2021: 89.5 ML/MIN/1.73
EOSINOPHIL # BLD AUTO: 0.14 10*3/MM3 (ref 0–0.4)
EOSINOPHIL NFR BLD AUTO: 1.2 % (ref 0.3–6.2)
ERYTHROCYTE [DISTWIDTH] IN BLOOD BY AUTOMATED COUNT: 13.2 % (ref 12.3–15.4)
GLOBULIN UR ELPH-MCNC: 2.6 GM/DL
GLUCOSE SERPL-MCNC: 111 MG/DL (ref 65–99)
HCT VFR BLD AUTO: 33.8 % (ref 34–46.6)
HGB BLD-MCNC: 11.2 G/DL (ref 12–15.9)
IMM GRANULOCYTES # BLD AUTO: 0.05 10*3/MM3 (ref 0–0.05)
IMM GRANULOCYTES NFR BLD AUTO: 0.4 % (ref 0–0.5)
IVRT: 95 MS
LEFT ATRIUM VOLUME INDEX: 48.9 ML/M2
LV EF 2D ECHO EST: 55 %
LYMPHOCYTES # BLD AUTO: 1.04 10*3/MM3 (ref 0.7–3.1)
LYMPHOCYTES NFR BLD AUTO: 8.8 % (ref 19.6–45.3)
MAGNESIUM SERPL-MCNC: 2.3 MG/DL (ref 1.6–2.4)
MCH RBC QN AUTO: 28.6 PG (ref 26.6–33)
MCHC RBC AUTO-ENTMCNC: 33.1 G/DL (ref 31.5–35.7)
MCV RBC AUTO: 86.2 FL (ref 79–97)
MONOCYTES # BLD AUTO: 1.26 10*3/MM3 (ref 0.1–0.9)
MONOCYTES NFR BLD AUTO: 10.7 % (ref 5–12)
MR PISA EROA: 0.5 CM2
NEUTROPHILS NFR BLD AUTO: 78.8 % (ref 42.7–76)
NEUTROPHILS NFR BLD AUTO: 9.33 10*3/MM3 (ref 1.7–7)
NRBC BLD AUTO-RTO: 0 /100 WBC (ref 0–0.2)
PISA ALIASING VEL: 61.6 M/S
PISA RADIUS: 0.4 CM
PLATELET # BLD AUTO: 231 10*3/MM3 (ref 140–450)
PMV BLD AUTO: 9.4 FL (ref 6–12)
POTASSIUM SERPL-SCNC: 3.3 MMOL/L (ref 3.5–5.2)
POTASSIUM SERPL-SCNC: 3.9 MMOL/L (ref 3.5–5.2)
PROCALCITONIN SERPL-MCNC: 0.07 NG/ML (ref 0–0.25)
PROT SERPL-MCNC: 5.7 G/DL (ref 6–8.5)
RBC # BLD AUTO: 3.92 10*6/MM3 (ref 3.77–5.28)
SODIUM SERPL-SCNC: 135 MMOL/L (ref 136–145)
WBC NRBC COR # BLD AUTO: 11.83 10*3/MM3 (ref 3.4–10.8)

## 2024-09-07 PROCEDURE — 84145 PROCALCITONIN (PCT): CPT | Performed by: HOSPITALIST

## 2024-09-07 PROCEDURE — 99232 SBSQ HOSP IP/OBS MODERATE 35: CPT | Performed by: INTERNAL MEDICINE

## 2024-09-07 PROCEDURE — 71275 CT ANGIOGRAPHY CHEST: CPT

## 2024-09-07 PROCEDURE — 25010000002 ENOXAPARIN PER 10 MG: Performed by: INTERNAL MEDICINE

## 2024-09-07 PROCEDURE — 25010000002 CEFTRIAXONE PER 250 MG: Performed by: HOSPITALIST

## 2024-09-07 PROCEDURE — 83735 ASSAY OF MAGNESIUM: CPT | Performed by: HOSPITALIST

## 2024-09-07 PROCEDURE — 93306 TTE W/DOPPLER COMPLETE: CPT

## 2024-09-07 PROCEDURE — 94761 N-INVAS EAR/PLS OXIMETRY MLT: CPT

## 2024-09-07 PROCEDURE — 80053 COMPREHEN METABOLIC PANEL: CPT | Performed by: HOSPITALIST

## 2024-09-07 PROCEDURE — 94799 UNLISTED PULMONARY SVC/PX: CPT

## 2024-09-07 PROCEDURE — 94664 DEMO&/EVAL PT USE INHALER: CPT

## 2024-09-07 PROCEDURE — 85379 FIBRIN DEGRADATION QUANT: CPT | Performed by: INTERNAL MEDICINE

## 2024-09-07 PROCEDURE — 85025 COMPLETE CBC W/AUTO DIFF WBC: CPT | Performed by: HOSPITALIST

## 2024-09-07 PROCEDURE — 84132 ASSAY OF SERUM POTASSIUM: CPT | Performed by: NURSE PRACTITIONER

## 2024-09-07 PROCEDURE — 93306 TTE W/DOPPLER COMPLETE: CPT | Performed by: INTERNAL MEDICINE

## 2024-09-07 PROCEDURE — 25510000001 IOPAMIDOL PER 1 ML: Performed by: INTERNAL MEDICINE

## 2024-09-07 RX ORDER — IOPAMIDOL 755 MG/ML
100 INJECTION, SOLUTION INTRAVASCULAR
Status: COMPLETED | OUTPATIENT
Start: 2024-09-07 | End: 2024-09-07

## 2024-09-07 RX ORDER — POTASSIUM CHLORIDE 1500 MG/1
40 TABLET, EXTENDED RELEASE ORAL EVERY 4 HOURS
Status: COMPLETED | OUTPATIENT
Start: 2024-09-07 | End: 2024-09-07

## 2024-09-07 RX ADMIN — PROPRANOLOL HYDROCHLORIDE 40 MG: 20 TABLET ORAL at 08:34

## 2024-09-07 RX ADMIN — IPRATROPIUM BROMIDE AND ALBUTEROL SULFATE 3 ML: 2.5; .5 SOLUTION RESPIRATORY (INHALATION) at 07:51

## 2024-09-07 RX ADMIN — LOSARTAN POTASSIUM 100 MG: 50 TABLET, FILM COATED ORAL at 08:33

## 2024-09-07 RX ADMIN — PROPRANOLOL HYDROCHLORIDE 40 MG: 20 TABLET ORAL at 21:08

## 2024-09-07 RX ADMIN — Medication 10 ML: at 08:32

## 2024-09-07 RX ADMIN — AZITHROMYCIN DIHYDRATE 250 MG: 250 TABLET ORAL at 08:33

## 2024-09-07 RX ADMIN — IOPAMIDOL 80 ML: 755 INJECTION, SOLUTION INTRAVENOUS at 14:10

## 2024-09-07 RX ADMIN — BUDESONIDE AND FORMOTEROL FUMARATE DIHYDRATE 2 PUFF: 160; 4.5 AEROSOL RESPIRATORY (INHALATION) at 07:51

## 2024-09-07 RX ADMIN — Medication 10 ML: at 21:08

## 2024-09-07 RX ADMIN — IPRATROPIUM BROMIDE AND ALBUTEROL SULFATE 3 ML: 2.5; .5 SOLUTION RESPIRATORY (INHALATION) at 16:46

## 2024-09-07 RX ADMIN — SENNOSIDES AND DOCUSATE SODIUM 2 TABLET: 50; 8.6 TABLET ORAL at 11:47

## 2024-09-07 RX ADMIN — AMLODIPINE BESYLATE 5 MG: 5 TABLET ORAL at 08:33

## 2024-09-07 RX ADMIN — IPRATROPIUM BROMIDE AND ALBUTEROL SULFATE 3 ML: 2.5; .5 SOLUTION RESPIRATORY (INHALATION) at 20:21

## 2024-09-07 RX ADMIN — POTASSIUM CHLORIDE 40 MEQ: 1500 TABLET, EXTENDED RELEASE ORAL at 11:47

## 2024-09-07 RX ADMIN — BUDESONIDE AND FORMOTEROL FUMARATE DIHYDRATE 2 PUFF: 160; 4.5 AEROSOL RESPIRATORY (INHALATION) at 20:21

## 2024-09-07 RX ADMIN — POTASSIUM CHLORIDE 40 MEQ: 1500 TABLET, EXTENDED RELEASE ORAL at 08:33

## 2024-09-07 RX ADMIN — GUAIFENESIN AND DEXTROMETHORPHAN HYDROBROMIDE 2 TABLET: 600; 30 TABLET, EXTENDED RELEASE ORAL at 08:33

## 2024-09-07 RX ADMIN — SODIUM CHLORIDE 2000 MG: 900 INJECTION INTRAVENOUS at 17:36

## 2024-09-07 RX ADMIN — ACETAMINOPHEN 650 MG: 325 TABLET ORAL at 08:36

## 2024-09-07 RX ADMIN — IPRATROPIUM BROMIDE AND ALBUTEROL SULFATE 3 ML: 2.5; .5 SOLUTION RESPIRATORY (INHALATION) at 13:31

## 2024-09-07 RX ADMIN — GUAIFENESIN AND DEXTROMETHORPHAN HYDROBROMIDE 2 TABLET: 600; 30 TABLET, EXTENDED RELEASE ORAL at 21:08

## 2024-09-07 NOTE — PLAN OF CARE
Goal Outcome Evaluation:  Plan of Care Reviewed With: patient                     Patient A&Ox4, VSS except temp, NSR on monitor, and on 2L NC with frequent productive cough. Patient had a fever of 101.6 at 2015 last night. Patient received PRN tylenol and temp went down to 98.5. Patient has no complaints at this time. Patient's potassium this morning was 3.3 and APRN on call put in replacement protocol. Phone and call light within reach.

## 2024-09-07 NOTE — PLAN OF CARE
Problem: Adult Inpatient Plan of Care  Goal: Plan of Care Review  Outcome: Ongoing, Progressing  Flowsheets (Taken 9/7/2024 1807)  Progress: improving  Plan of Care Reviewed With:   patient   daughter  Outcome Evaluation: Pt AO x/VSS/94% on 2L, drops to 87% on RA/sinus with PVC. Pt had some back pain, PRN meds given. ECHO and CTA done today. Continue monitoring.  Goal: Patient-Specific Goal (Individualized)  Outcome: Ongoing, Progressing  Goal: Absence of Hospital-Acquired Illness or Injury  Outcome: Ongoing, Progressing  Intervention: Identify and Manage Fall Risk  Description: Perform standard risk assessment on admission using a validated tool or comprehensive approach appropriate to the patient; reassess fall risk frequently, with change in status or transfer to another level of care.  Communicate fall injury risk to interprofessional healthcare team.  Determine need for increased observation, equipment and environmental modification, such as low bed, signage and supportive, nonskid footwear.  Adjust safety measures to individual developmental age, stage and identified risk factors.  Reinforce the importance of safety and physical activity with patient and family.  Perform regular intentional rounding to assess need for position change, pain assessment and personal needs, including assistance with toileting.  Recent Flowsheet Documentation  Taken 9/7/2024 1800 by Keeley Bean, RN  Safety Promotion/Fall Prevention:   assistive device/personal items within reach   clutter free environment maintained   nonskid shoes/slippers when out of bed   room organization consistent   safety round/check completed   toileting scheduled  Taken 9/7/2024 1610 by Keeley Bean, RN  Safety Promotion/Fall Prevention:   assistive device/personal items within reach   clutter free environment maintained   nonskid shoes/slippers when out of bed   room organization consistent   safety round/check completed   toileting  scheduled  Taken 9/7/2024 1400 by Keeley Bean RN  Safety Promotion/Fall Prevention:   assistive device/personal items within reach   clutter free environment maintained   nonskid shoes/slippers when out of bed   room organization consistent   safety round/check completed   toileting scheduled  Taken 9/7/2024 1200 by Keeley Bean RN  Safety Promotion/Fall Prevention:   assistive device/personal items within reach   clutter free environment maintained   nonskid shoes/slippers when out of bed   room organization consistent   safety round/check completed   toileting scheduled  Taken 9/7/2024 1000 by Keeley Bean RN  Safety Promotion/Fall Prevention:   assistive device/personal items within reach   clutter free environment maintained   nonskid shoes/slippers when out of bed   room organization consistent   safety round/check completed   toileting scheduled  Taken 9/7/2024 0836 by Keeley Bean RN  Safety Promotion/Fall Prevention:   assistive device/personal items within reach   clutter free environment maintained   nonskid shoes/slippers when out of bed   room organization consistent   safety round/check completed   toileting scheduled  Intervention: Prevent Skin Injury  Description: Perform a screening for skin injury risk, such as pressure or moisture associated skin damage on admission and at regular intervals throughout hospital stay.  Keep all areas of skin (especially folds) clean and dry.  Maintain adequate skin hydration.  Relieve and redistribute pressure and protect bony prominences; implement measures based on patient-specific risk factors.  Match turning and repositioning schedule to clinical condition.  Encourage weight shift frequently; assist with reposition if unable to complete independently.  Float heels off bed; avoid pressure on the Achilles tendon.  Keep skin free from extended contact with medical devices.  Encourage functional activity and mobility, as early as tolerated.  Use aids  (e.g., slide boards, mechanical lift) during transfer.  Recent Flowsheet Documentation  Taken 9/7/2024 1800 by Keeley Bean RN  Body Position: position changed independently  Skin Protection:   adhesive use limited   skin sealant/moisture barrier applied   skin-to-device areas padded   skin-to-skin areas padded   transparent dressing maintained   tubing/devices free from skin contact  Taken 9/7/2024 1610 by Keeley Bean RN  Body Position: position changed independently  Skin Protection:   adhesive use limited   skin sealant/moisture barrier applied   skin-to-device areas padded   skin-to-skin areas padded   transparent dressing maintained   tubing/devices free from skin contact  Taken 9/7/2024 1400 by Keeley Bean RN  Body Position: position changed independently  Skin Protection:   adhesive use limited   skin sealant/moisture barrier applied   skin-to-device areas padded   skin-to-skin areas padded   transparent dressing maintained   tubing/devices free from skin contact  Taken 9/7/2024 1200 by Keeley Bean RN  Body Position: position changed independently  Skin Protection:   adhesive use limited   skin sealant/moisture barrier applied   skin-to-device areas padded   skin-to-skin areas padded   transparent dressing maintained   tubing/devices free from skin contact  Taken 9/7/2024 1000 by Keeley Bean RN  Body Position: position changed independently  Skin Protection:   adhesive use limited   skin sealant/moisture barrier applied   skin-to-device areas padded   skin-to-skin areas padded   transparent dressing maintained   tubing/devices free from skin contact  Taken 9/7/2024 0836 by Keeley Bean RN  Body Position: position changed independently  Skin Protection:   adhesive use limited   skin sealant/moisture barrier applied   skin-to-device areas padded   skin-to-skin areas padded   transparent dressing maintained   tubing/devices free from skin contact  Intervention: Prevent and Manage VTE  (Venous Thromboembolism) Risk  Description: Assess for VTE (venous thromboembolism) risk.  Encourage and assist with early ambulation.  Initiate and maintain compression or other therapy, as indicated, based on identified risk in accordance with organizational protocol and provider order.  Encourage both active and passive leg exercises while in bed, if unable to ambulate.  Recent Flowsheet Documentation  Taken 9/7/2024 1800 by Keeley Bean RN  Activity Management: activity encouraged  Taken 9/7/2024 1610 by Keeley Bean RN  Activity Management: activity encouraged  Taken 9/7/2024 1400 by Keeley Bean RN  Activity Management: activity encouraged  Taken 9/7/2024 1200 by Keeley Bean RN  Activity Management: activity encouraged  Taken 9/7/2024 1000 by Keeley Bean RN  Activity Management: activity encouraged  Taken 9/7/2024 0836 by Keeley Bean RN  Activity Management: activity encouraged  VTE Prevention/Management: (see MAR)   sequential compression devices off   other (see comments)  Range of Motion: active ROM (range of motion) encouraged  Intervention: Prevent Infection  Description: Maintain skin and mucous membrane integrity; promote hand, oral and pulmonary hygiene.  Optimize fluid balance, nutrition, sleep and glycemic control to maximize infection resistance.  Identify potential sources of infection early to prevent or mitigate progression of infection (e.g., wound, lines, devices).  Evaluate ongoing need for invasive devices; remove promptly when no longer indicated.  Recent Flowsheet Documentation  Taken 9/7/2024 1800 by Keeley Bean RN  Infection Prevention:   environmental surveillance performed   rest/sleep promoted   single patient room provided  Taken 9/7/2024 1610 by Keeley Bean RN  Infection Prevention:   environmental surveillance performed   rest/sleep promoted   single patient room provided  Taken 9/7/2024 1400 by Keeley Bean RN  Infection Prevention:   environmental  surveillance performed   rest/sleep promoted   single patient room provided  Taken 9/7/2024 1200 by Keeley Bean RN  Infection Prevention:   environmental surveillance performed   rest/sleep promoted   single patient room provided  Taken 9/7/2024 1000 by Keeley Bean RN  Infection Prevention:   environmental surveillance performed   rest/sleep promoted   single patient room provided  Taken 9/7/2024 0836 by Keeley Bean RN  Infection Prevention:   environmental surveillance performed   rest/sleep promoted   single patient room provided  Goal: Optimal Comfort and Wellbeing  Outcome: Ongoing, Progressing  Intervention: Monitor Pain and Promote Comfort  Description: Assess pain level, treatment efficacy and patient response at regular intervals using a consistent pain scale.  Consider the presence and impact of preexisting chronic pain.  Encourage patient and caregiver involvement in pain assessment, interventions and safety measures.  Recent Flowsheet Documentation  Taken 9/7/2024 1800 by Keeley Bean RN  Pain Management Interventions:   quiet environment facilitated   care clustered  Taken 9/7/2024 1610 by Keeley Bean RN  Pain Management Interventions:   quiet environment facilitated   care clustered  Taken 9/7/2024 1400 by Keeley Bean RN  Pain Management Interventions:   quiet environment facilitated   care clustered  Taken 9/7/2024 1200 by Keeley Bean RN  Pain Management Interventions:   quiet environment facilitated   care clustered  Taken 9/7/2024 1000 by Keeley Bean RN  Pain Management Interventions:   quiet environment facilitated   care clustered  Taken 9/7/2024 0836 by Keeley Bean RN  Pain Management Interventions:   see MAR   quiet environment facilitated   care clustered  Intervention: Provide Person-Centered Care  Description: Use a family-focused approach to care.  Develop trust and rapport by proactively providing information, encouraging questions, addressing concerns and  offering reassurance.  Acknowledge emotional response to hospitalization.  Recognize and utilize personal coping strategies.  Honor spiritual and cultural preferences.  Recent Flowsheet Documentation  Taken 9/7/2024 1800 by Keeley Bean RN  Trust Relationship/Rapport:   care explained   choices provided   emotional support provided   empathic listening provided   questions answered   questions encouraged   reassurance provided   thoughts/feelings acknowledged  Taken 9/7/2024 1610 by Keeley Bean RN  Trust Relationship/Rapport:   care explained   choices provided   emotional support provided   empathic listening provided   questions answered   questions encouraged   reassurance provided   thoughts/feelings acknowledged  Taken 9/7/2024 1400 by Keeley Bean RN  Trust Relationship/Rapport:   care explained   choices provided   emotional support provided   empathic listening provided   questions answered   questions encouraged   reassurance provided   thoughts/feelings acknowledged  Taken 9/7/2024 1200 by Keeley Bean RN  Trust Relationship/Rapport:   care explained   choices provided   emotional support provided   empathic listening provided   questions answered   questions encouraged   reassurance provided   thoughts/feelings acknowledged  Taken 9/7/2024 1000 by Keeley Bean RN  Trust Relationship/Rapport:   care explained   choices provided   emotional support provided   empathic listening provided   questions answered   questions encouraged   reassurance provided   thoughts/feelings acknowledged  Taken 9/7/2024 0836 by Keeley Bean RN  Trust Relationship/Rapport:   care explained   choices provided   emotional support provided   empathic listening provided   questions answered   questions encouraged   reassurance provided   thoughts/feelings acknowledged  Goal: Readiness for Transition of Care  Outcome: Ongoing, Progressing     Problem: Hypertension Comorbidity  Goal: Blood Pressure in Desired  Range  Outcome: Ongoing, Progressing  Intervention: Maintain Blood Pressure Management  Description: Evaluate adherence to home antihypertensive regimen (e.g., exercise and activity, diet modification, medication).  Provide scheduled antihypertensive medication; consider administration time and effects (e.g., avoid giving diuretic prior to bedtime).  Monitor response to antihypertensive medication therapy (e.g., blood pressure, electrolyte levels, medication effects).  Minimize risk of orthostatic hypotension; encourage caution with position changes, particularly if elderly.  Recent Flowsheet Documentation  Taken 9/7/2024 1800 by Keeley Bean RN  Medication Review/Management: medications reviewed  Taken 9/7/2024 1610 by Keeley Bean RN  Medication Review/Management: medications reviewed  Taken 9/7/2024 1400 by Keeley Bean RN  Medication Review/Management: medications reviewed  Taken 9/7/2024 1200 by Keeley Bean RN  Medication Review/Management: medications reviewed  Taken 9/7/2024 1000 by Keeley Bean RN  Medication Review/Management: medications reviewed  Taken 9/7/2024 0836 by Keeley Bean RN  Syncope Management: position changed slowly  Medication Review/Management: medications reviewed     Problem: Skin Injury Risk Increased  Goal: Skin Health and Integrity  Outcome: Ongoing, Progressing  Intervention: Optimize Skin Protection  Description: Perform a full pressure injury risk assessment, as indicated by screening, upon admission to care unit.  Reassess skin (injury risk, full inspection) frequently (e.g., scheduled interval, with change in condition) to provide optimal early detection and prevention.  Maintain adequate tissue perfusion (e.g., encourage fluid balance; avoid crossing legs, constrictive clothing or devices) to promote tissue oxygenation.  Maintain head of bed at lowest degree of elevation tolerated, considering medical condition and other restrictions.  Avoid positioning onto an  area that remains reddened.  Minimize incontinence and moisture (e.g., toileting schedule; moisture-wicking pad, diaper or incontinence collection device; skin moisture barrier).  Cleanse skin promptly and gently when soiled utilizing a pH-balanced cleanser.  Relieve and redistribute pressure (e.g., scheduled position changes, weight shifts, use of support surface, medical device repositioning, protective dressing application, use of positioning device, microclimate control, use of pressure-injury-monitor  Encourage increased activity, such as sitting in a chair at the bedside or early mobilization, when able to tolerate.  Recent Flowsheet Documentation  Taken 9/7/2024 1800 by Keeley Bean RN  Pressure Reduction Techniques: frequent weight shift encouraged  Head of Bed (HOB) Positioning: [facility] elevated  Pressure Reduction Devices:   pressure-redistributing mattress utilized   positioning supports utilized   heel offloading device utilized  Skin Protection:   adhesive use limited   skin sealant/moisture barrier applied   skin-to-device areas padded   skin-to-skin areas padded   transparent dressing maintained   tubing/devices free from skin contact  Taken 9/7/2024 1610 by Keeley Bean RN  Pressure Reduction Techniques: frequent weight shift encouraged  Head of Bed (HOB) Positioning: [facility] elevated  Pressure Reduction Devices:   pressure-redistributing mattress utilized   positioning supports utilized   heel offloading device utilized  Skin Protection:   adhesive use limited   skin sealant/moisture barrier applied   skin-to-device areas padded   skin-to-skin areas padded   transparent dressing maintained   tubing/devices free from skin contact  Taken 9/7/2024 1400 by Keeley Bean RN  Pressure Reduction Techniques: frequent weight shift encouraged  Head of Bed (HOB) Positioning: [facility] elevated  Pressure Reduction Devices:   pressure-redistributing mattress utilized   positioning supports utilized   heel offloading  device utilized  Skin Protection:   adhesive use limited   skin sealant/moisture barrier applied   skin-to-device areas padded   skin-to-skin areas padded   transparent dressing maintained   tubing/devices free from skin contact  Taken 9/7/2024 1200 by Keeley Bean RN  Pressure Reduction Techniques: frequent weight shift encouraged  Head of Bed (HOB) Positioning: [facility] elevated  Pressure Reduction Devices:   pressure-redistributing mattress utilized   positioning supports utilized   heel offloading device utilized  Skin Protection:   adhesive use limited   skin sealant/moisture barrier applied   skin-to-device areas padded   skin-to-skin areas padded   transparent dressing maintained   tubing/devices free from skin contact  Taken 9/7/2024 1000 by Keeley Bean RN  Pressure Reduction Techniques: frequent weight shift encouraged  Head of Bed (HOB) Positioning: [facility] elevated  Pressure Reduction Devices:   pressure-redistributing mattress utilized   positioning supports utilized   heel offloading device utilized  Skin Protection:   adhesive use limited   skin sealant/moisture barrier applied   skin-to-device areas padded   skin-to-skin areas padded   transparent dressing maintained   tubing/devices free from skin contact  Taken 9/7/2024 0836 by Keeley Bean RN  Pressure Reduction Techniques: frequent weight shift encouraged  Head of Bed (HOB) Positioning: [facility] elevated  Pressure Reduction Devices:   pressure-redistributing mattress utilized   positioning supports utilized   heel offloading device utilized  Skin Protection:   adhesive use limited   skin sealant/moisture barrier applied   skin-to-device areas padded   skin-to-skin areas padded   transparent dressing maintained   tubing/devices free from skin contact     Problem: Fluid Imbalance (Pneumonia)  Goal: Fluid Balance  Outcome: Ongoing, Progressing  Intervention: Monitor and Manage Fluid Balance  Description: Assess fluid requirements and deficit to determine  goal-directed fluid therapy.  Keep accurate intake, output and daily weight; monitor trends.  Monitor laboratory value trends and need for treatment adjustment.  Assess need for ongoing intravenous fluid therapy; encourage oral intake when able.  Recent Flowsheet Documentation  Taken 9/7/2024 1800 by Keeley Bean RN  Fluid/Electrolyte Management: fluids provided  Taken 9/7/2024 1610 by Keeley Bean RN  Fluid/Electrolyte Management: fluids provided  Taken 9/7/2024 1400 by Keeley Bean RN  Fluid/Electrolyte Management: fluids provided  Taken 9/7/2024 1200 by Keeley Bean RN  Fluid/Electrolyte Management: fluids provided  Taken 9/7/2024 1000 by Keeley Bean RN  Fluid/Electrolyte Management: fluids provided  Taken 9/7/2024 0836 by Keeley Bean RN  Fluid/Electrolyte Management: fluids provided     Problem: Infection (Pneumonia)  Goal: Resolution of Infection Signs and Symptoms  Outcome: Ongoing, Progressing     Problem: Respiratory Compromise (Pneumonia)  Goal: Effective Oxygenation and Ventilation  Outcome: Ongoing, Progressing  Intervention: Promote Airway Secretion Clearance  Description: Assess the effectiveness of pulmonary hygiene and ability to perform airway clearance techniques.  Promote early mobility or ambulation; match activity to ability and tolerance.  Encourage deep breathing and lung expansion therapy to prevent atelectasis; adjust treatment to patient’s response.  Anticipate the need to splint chest or abdominal wall with cough to minimize discomfort; assist if needed.  Initiate cough-enhancement and airway-clearance techniques with instruction.  Consider inhaled pharmacologic therapy (e.g., beta-2 agonist, mucolytic, corticosteroid, antimicrobial) to improve mucus clearance, inflammation, cough response and air flow.  Recent Flowsheet Documentation  Taken 9/7/2024 1800 by Keeley Bean RN  Breathing Techniques/Airway Clearance: deep/controlled cough encouraged  Taken 9/7/2024 1610  by Keeley Bean RN  Breathing Techniques/Airway Clearance: deep/controlled cough encouraged  Taken 9/7/2024 1400 by Keeley Bean RN  Breathing Techniques/Airway Clearance: deep/controlled cough encouraged  Taken 9/7/2024 1200 by Keeley Bean RN  Breathing Techniques/Airway Clearance: deep/controlled cough encouraged  Taken 9/7/2024 1000 by Keeley Bean RN  Breathing Techniques/Airway Clearance: deep/controlled cough encouraged  Taken 9/7/2024 0836 by Keeley Bean RN  Breathing Techniques/Airway Clearance: deep/controlled cough encouraged  Cough And Deep Breathing: done independently per patient  Intervention: Optimize Oxygenation and Ventilation  Description: Assess and monitor airway, breathing and circulation for effective oxygenation and ventilation; consider oxygenation and ventilation parameters and goal.  Anticipate noninvasive and invasive monitoring (e.g., pulse oximetry, end-tidal carbon dioxide, blood gases, cardiovascular).  Maintain optimal position to relieve discomfort, breathlessness and ventilation-perfusion mismatch.  Provide oxygen therapy judiciously to avoid hyperoxemia; adjust to achieve oxygenation goal.  Monitor fluid balance closely to minimize the risk of fluid overload.  Consider noninvasive or invasive positive pressure ventilation to enhance oxygenation and ventilation, as well as reduce work of breathing.  Recent Flowsheet Documentation  Taken 9/7/2024 1800 by Keeley Bean RN  Head of Bed ([facility]) Positioning: [facility] elevated  Airway/Ventilation Management: pulmonary hygiene promoted  Taken 9/7/2024 1610 by Keeley Bean RN  Head of Bed ([facility]) Positioning: [facility] elevated  Airway/Ventilation Management: pulmonary hygiene promoted  Taken 9/7/2024 1400 by Keeley Bean RN  Head of Bed ([facility]) Positioning: [facility] elevated  Airway/Ventilation Management: pulmonary hygiene promoted  Taken 9/7/2024 1200 by Keeley Bean RN  Head of Bed ([facility]) Positioning: [facility]  elevated  Airway/Ventilation Management: pulmonary hygiene promoted  Taken 9/7/2024 1000 by Keeley Bean RN  Head of Bed ([facility]) Positioning: [facility] elevated  Airway/Ventilation Management: pulmonary hygiene promoted  Taken 9/7/2024 0836 by Keeley Bean RN  Head of Bed ([facility]) Positioning: [facility] elevated  Airway/Ventilation Management: pulmonary hygiene promoted     Problem: Fall Injury Risk  Goal: Absence of Fall and Fall-Related Injury  Outcome: Ongoing, Progressing  Intervention: Identify and Manage Contributors  Description: Develop a fall prevention plan with the patient and caregiver/family.  Provide reorientation, appropriate sensory stimulation and routines with changes in mental status to decrease risk of fall.  Promote use of personal vision and auditory aids.  Assess assistance level required for safe and effective self-care; provide support as needed, such as toileting, mobilization. For age 65 and older, implement timed toileting with assistance.  Encourage physical activity, such as performance of mobility and self-care at highest level of patient ability, multicomponent exercise program and provision of appropriate assistive devices.  If fall occurs, assess the severity of injury; implement fall injury protocol. Determine the cause and revise fall injury prevention plan.  Regularly review medication contribution to fall risk; adjust medication administration times to minimize risk of falling.  Consider risk related to polypharmacy and age.  Balance adequate pain management with potential for oversedation.  Recent Flowsheet Documentation  Taken 9/7/2024 1800 by Keeley Bean RN  Medication Review/Management: medications reviewed  Self-Care Promotion: independence encouraged  Taken 9/7/2024 1610 by Keeley Bean RN  Medication Review/Management: medications reviewed  Self-Care Promotion: independence encouraged  Taken 9/7/2024 1400 by Keeley Bean RN  Medication Review/Management: medications  reviewed  Self-Care Promotion: independence encouraged  Taken 9/7/2024 1200 by Keeley Bean RN  Medication Review/Management: medications reviewed  Self-Care Promotion: independence encouraged  Taken 9/7/2024 1000 by Keeley Bean RN  Medication Review/Management: medications reviewed  Self-Care Promotion: independence encouraged  Taken 9/7/2024 0836 by Keeley Bean RN  Medication Review/Management: medications reviewed  Self-Care Promotion: independence encouraged  Intervention: Promote Injury-Free Environment  Description: Provide a safe, barrier-free environment that encourages independent activity.  Keep care area uncluttered and well-lighted.  Determine need for increased observation or monitoring.  Avoid use of devices that minimize mobility, such as restraints or indwelling urinary catheter.  Recent Flowsheet Documentation  Taken 9/7/2024 1800 by Keeley Bean RN  Safety Promotion/Fall Prevention:   assistive device/personal items within reach   clutter free environment maintained   nonskid shoes/slippers when out of bed   room organization consistent   safety round/check completed   toileting scheduled  Taken 9/7/2024 1610 by Keeley Bean RN  Safety Promotion/Fall Prevention:   assistive device/personal items within reach   clutter free environment maintained   nonskid shoes/slippers when out of bed   room organization consistent   safety round/check completed   toileting scheduled  Taken 9/7/2024 1400 by Keeley Bean RN  Safety Promotion/Fall Prevention:   assistive device/personal items within reach   clutter free environment maintained   nonskid shoes/slippers when out of bed   room organization consistent   safety round/check completed   toileting scheduled  Taken 9/7/2024 1200 by Keeley Bean RN  Safety Promotion/Fall Prevention:   assistive device/personal items within reach   clutter free environment maintained   nonskid shoes/slippers when out of bed   room organization  consistent   safety round/check completed   toileting scheduled  Taken 9/7/2024 1000 by Keeley Bean RN  Safety Promotion/Fall Prevention:   assistive device/personal items within reach   clutter free environment maintained   nonskid shoes/slippers when out of bed   room organization consistent   safety round/check completed   toileting scheduled  Taken 9/7/2024 0836 by Keeley Bean RN  Safety Promotion/Fall Prevention:   assistive device/personal items within reach   clutter free environment maintained   nonskid shoes/slippers when out of bed   room organization consistent   safety round/check completed   toileting scheduled   Goal Outcome Evaluation:  Plan of Care Reviewed With: patient, daughter        Progress: improving  Outcome Evaluation: Pt AO x/VSS/94% on 2L, drops to 87% on RA/sinus with PVC. Pt had some back pain, PRN meds given. ECHO and CTA done today. Continue monitoring.

## 2024-09-07 NOTE — PROGRESS NOTES
[facility] Medicine Services  PROGRESS NOTE    Patient Name: Beth Fregoso  : 1935  MRN: [id number]    Date of Admission: 2024  Primary Care Physician: Tripp Linder MD    Subjective   Subjective     CC:  Follow-up for community-acquired pneumonia    HPI:  Patient seen and examined this morning.  Still having some cough that is dry.  Getting short of breath with minimal activity.  Oxygen saturation in the upper 90s on 2 L nasal cannula.  Updated her about the diagnosis of rhinovirus and bilateral pneumonia      Objective   Objective     Vital Signs:   Temp:  [97.7 °F (36.5 °C)-101.6 °F (38.7 °C)] 98.7 °F (37.1 °C)  Heart Rate:  [67-87] 73  Resp:  [18-20] 19  BP: (140-166)/(50-61) 166/61  Flow (L/min):  [2] 2     Physical Exam:  General: Comfortable, not in distress, conversant and cooperative  Head: Atraumatic and normocephalic  Eyes: No Icterus. No pallor  Ears:  Ears appear intact with no abnormalities noted  Throat: No oral lesions, no thrush  Neck: Supple, trachea midline  Lungs: Diminished air entry both lung fields.  Coarse crackles right lung base  Heart:  Normal S1 and S2, no murmur, no gallop, No JVD, no lower extremity swelling  Abdomen:  Soft, no tenderness, no organomegaly, normal bowel sounds, no organomegaly  Extremities: pulses equal bilaterally  Skin: No bleeding, bruising or rash, normal skin turgor and elasticity  Neurologic: Cranial nerves appear intact with no evidence of facial asymmetry, normal motor and sensory functions in all 4 extremities  Psych: Alert and oriented x 3, normal mood    Results Reviewed:  LAB RESULTS:      Lab 24  0348 24  0347 24  0432 24   WBC 11.83*  --  11.64* 13.73*   HEMOGLOBIN 11.2*  --  11.2* 11.8*   HEMATOCRIT 33.8*  --  33.5* 34.6   PLATELETS 231  --  226 212   NEUTROS ABS 9.33*  --   --  10.98*   IMMATURE GRANS (ABS) 0.05  --   --  0.05   LYMPHS ABS 1.04  --   --  1.22   MONOS ABS 1.26*   --   --  1.40*   EOS ABS 0.14  --   --  0.06   MCV 86.2  --  86.1 86.1   PROCALCITONIN  --  0.07  --  0.10   LACTATE  --   --   --  0.9         Lab 09/07/24 0347 09/06/24 0432 09/05/24 1959   SODIUM 135* 133* 127*   POTASSIUM 3.3* 3.6 4.4   CHLORIDE 96* 92* 90*   CO2 30.0* 27.0 27.0   ANION GAP 9.0 14.0 10.0   BUN 10 11 14   CREATININE 0.52* 0.46* 0.57   EGFR 89.5 92.2 87.5   GLUCOSE 111* 104* 112*   CALCIUM 8.4* 8.5* 8.7   MAGNESIUM 2.3  --   --          Lab 09/07/24 0347 09/05/24 1959   TOTAL PROTEIN 5.7* 6.1   ALBUMIN 3.1* 3.6   GLOBULIN 2.6 2.5   ALT (SGPT) 9 8   AST (SGOT) 12 8   BILIRUBIN 0.4 0.8   ALK PHOS 60 68         Lab 09/05/24 1959   PROBNP 2,397.0*   HSTROP T 12                 Lab 09/05/24 2314   PH, ARTERIAL 7.504*   PCO2, ARTERIAL 38.1   PO2 ART 58.8*   FIO2 28   HCO3 ART 30.0*   BASE EXCESS ART 6.5*   CARBOXYHEMOGLOBIN 1.6     Brief Urine Lab Results       None            Microbiology Results Abnormal       Procedure Component Value - Date/Time    Blood Culture - Blood, Wrist, Right [[id number]]  (Normal) Collected: 09/05/24 2325    Lab Status: Preliminary result Specimen: Blood from Wrist, Right Updated: 09/06/24 2345     Blood Culture No growth at 24 hours    Narrative:      Less than seven (7) mL's of blood was collected.  Insufficient quantity may yield false negative results.    Blood Culture - Blood, Arm, Right [[id number]]  (Normal) Collected: 09/05/24 2325    Lab Status: Preliminary result Specimen: Blood from Arm, Right Updated: 09/06/24 2345     Blood Culture No growth at 24 hours    Narrative:      Less than seven (7) mL's of blood was collected.  Insufficient quantity may yield false negative results.    S. Pneumo Ag Urine or CSF - Urine, Urine, Clean Catch [[id number]]  (Normal) Collected: 09/06/24 0847    Lab Status: Final result Specimen: Urine, Clean Catch Updated: 09/06/24 1256     Strep Pneumo Ag Negative    Legionella Antigen, Urine - Urine, Urine, Random Void [[id number]]   (Normal) Collected: 09/06/24 0847    Lab Status: Final result Specimen: Urine, Random Void Updated: 09/06/24 1256     LEGIONELLA ANTIGEN, URINE Negative            XR Chest 1 View    Result Date: 9/5/2024  XR CHEST 1 VW Date of Exam: 9/5/2024 7:35 PM EDT Indication: SOA triage protocol Comparison: Chest radiograph 5/8/2017 Findings: Mild cardiomegaly. Aortic atherosclerotic disease. Patchy bibasilar airspace opacities which may also involve the middle lobe and lingula. Blunted costophrenic angles consistent with small effusions. Probable peribronchial thickening. No overt edema or pneumothorax. Degenerative related osseous changes.     Impression: Impression: Patchy multifocal airspace disease suspicious for pneumonia pneumonia or aspiration with small bilateral pleural effusions. Commend radiographic follow-up to resolution in 6 to 8 weeks. Electronically Signed: Korey Chanel MD  9/5/2024 7:57 PM EDT  Workstation ID: [id number]         Current medications:  Scheduled Meds:amLODIPine, 5 mg, Oral, Q24H  azithromycin, 250 mg, Oral, Q24H  budesonide-formoterol, 2 puff, Inhalation, BID - RT  cefTRIAXone, 2,000 mg, Intravenous, Q24H  enoxaparin, 40 mg, Subcutaneous, Daily  guaifenesin-dextromethorphan 600-30 mg, 2 tablet, Oral, Q12H  ipratropium-albuterol, 3 mL, Nebulization, 4x Daily - RT  losartan, 100 mg, Oral, Q24H  potassium chloride ER, 40 mEq, Oral, Q4H  propranolol, 40 mg, Oral, BID  sodium chloride, 10 mL, Intravenous, Q12H      Continuous Infusions:   PRN Meds:.  acetaminophen    albuterol    ALPRAZolam    senna-docusate sodium **AND** polyethylene glycol **AND** bisacodyl **AND** bisacodyl    Calcium Replacement - Follow Nurse / BPA Driven Protocol    ipratropium-albuterol    Magnesium Standard Dose Replacement - Follow Nurse / BPA Driven Protocol    nitroglycerin    Phosphorus Replacement - Follow Nurse / BPA Driven Protocol    Potassium Replacement - Follow Nurse / BPA Driven Protocol    sodium chloride     sodium chloride    sodium chloride    Assessment & Plan   Assessment & Plan     Active Hospital Problems    Diagnosis  POA    **Pneumonia [J18.9]  Yes      Resolved Hospital Problems   No resolved problems to display.        Brief Hospital Course to date:  Beth Fregoso is a 88 y.o. female with history of essential hypertension, COPD, remote breast cancer who presented to the hospital with dyspnea and cough.  Found to have bilateral pneumonia    Bilateral community-acquired pneumonia  COPD with acute exacerbation  Rhinovirus infection  X-ray with bilateral multifocal pneumonia, worse on the right side  Respiratory panel positive for rhinovirus  Continue Rocephin and azithromycin  Continue Symbicort  DuoNebs PRN  D-dimer is elevated.  Follow CTA chest    Rule out congestive heart failure  Follow-up echo    Essential hypertension  Continue losartan and propranolol    Hyponatremia  Mild and resolved.  Of no clinical significance        Expected Discharge Location and Transportation: home  Expected Discharge   Expected discharge date/ time has not been documented.     VTE Prophylaxis:  Pharmacologic VTE prophylaxis orders are present.         AM-PAC 6 Clicks Score (PT): 21 (09/06/24 2036)    CODE STATUS:   Code Status and Medical Interventions: CPR (Attempt to Resuscitate); Full Support   Ordered at: 09/06/24 0133     Level Of Support Discussed With:    Patient     Code Status (Patient has no pulse and is not breathing):    CPR (Attempt to Resuscitate)     Medical Interventions (Patient has pulse or is breathing):    Full Support       Fanny Banegas MD  09/07/24

## 2024-09-08 LAB
ANION GAP SERPL CALCULATED.3IONS-SCNC: 8 MMOL/L (ref 5–15)
BASOPHILS # BLD AUTO: 0.02 10*3/MM3 (ref 0–0.2)
BASOPHILS NFR BLD AUTO: 0.2 % (ref 0–1.5)
BUN SERPL-MCNC: 11 MG/DL (ref 8–23)
BUN/CREAT SERPL: 22.4 (ref 7–25)
CALCIUM SPEC-SCNC: 8.4 MG/DL (ref 8.6–10.5)
CHLORIDE SERPL-SCNC: 97 MMOL/L (ref 98–107)
CO2 SERPL-SCNC: 29 MMOL/L (ref 22–29)
CREAT SERPL-MCNC: 0.49 MG/DL (ref 0.57–1)
DEPRECATED RDW RBC AUTO: 43.7 FL (ref 37–54)
EGFRCR SERPLBLD CKD-EPI 2021: 90.8 ML/MIN/1.73
EOSINOPHIL # BLD AUTO: 0.2 10*3/MM3 (ref 0–0.4)
EOSINOPHIL NFR BLD AUTO: 2.1 % (ref 0.3–6.2)
ERYTHROCYTE [DISTWIDTH] IN BLOOD BY AUTOMATED COUNT: 13.4 % (ref 12.3–15.4)
GLUCOSE SERPL-MCNC: 101 MG/DL (ref 65–99)
HCT VFR BLD AUTO: 34.5 % (ref 34–46.6)
HGB BLD-MCNC: 11.4 G/DL (ref 12–15.9)
IMM GRANULOCYTES # BLD AUTO: 0.05 10*3/MM3 (ref 0–0.05)
IMM GRANULOCYTES NFR BLD AUTO: 0.5 % (ref 0–0.5)
LYMPHOCYTES # BLD AUTO: 1.17 10*3/MM3 (ref 0.7–3.1)
LYMPHOCYTES NFR BLD AUTO: 12.2 % (ref 19.6–45.3)
MAGNESIUM SERPL-MCNC: 2.3 MG/DL (ref 1.6–2.4)
MCH RBC QN AUTO: 29 PG (ref 26.6–33)
MCHC RBC AUTO-ENTMCNC: 33 G/DL (ref 31.5–35.7)
MCV RBC AUTO: 87.8 FL (ref 79–97)
MONOCYTES # BLD AUTO: 1.06 10*3/MM3 (ref 0.1–0.9)
MONOCYTES NFR BLD AUTO: 11.1 % (ref 5–12)
NEUTROPHILS NFR BLD AUTO: 7.06 10*3/MM3 (ref 1.7–7)
NEUTROPHILS NFR BLD AUTO: 73.9 % (ref 42.7–76)
NRBC BLD AUTO-RTO: 0 /100 WBC (ref 0–0.2)
PHOSPHATE SERPL-MCNC: 3.2 MG/DL (ref 2.5–4.5)
PLATELET # BLD AUTO: 270 10*3/MM3 (ref 140–450)
PMV BLD AUTO: 9.2 FL (ref 6–12)
POTASSIUM SERPL-SCNC: 4.3 MMOL/L (ref 3.5–5.2)
QT INTERVAL: 368 MS
QTC INTERVAL: 405 MS
RBC # BLD AUTO: 3.93 10*6/MM3 (ref 3.77–5.28)
SODIUM SERPL-SCNC: 134 MMOL/L (ref 136–145)
WBC NRBC COR # BLD AUTO: 9.56 10*3/MM3 (ref 3.4–10.8)

## 2024-09-08 PROCEDURE — 94799 UNLISTED PULMONARY SVC/PX: CPT

## 2024-09-08 PROCEDURE — 94761 N-INVAS EAR/PLS OXIMETRY MLT: CPT

## 2024-09-08 PROCEDURE — 94664 DEMO&/EVAL PT USE INHALER: CPT

## 2024-09-08 PROCEDURE — 25010000002 ENOXAPARIN PER 10 MG: Performed by: INTERNAL MEDICINE

## 2024-09-08 PROCEDURE — 99232 SBSQ HOSP IP/OBS MODERATE 35: CPT | Performed by: INTERNAL MEDICINE

## 2024-09-08 PROCEDURE — 25010000002 CEFTRIAXONE PER 250 MG: Performed by: HOSPITALIST

## 2024-09-08 PROCEDURE — 85025 COMPLETE CBC W/AUTO DIFF WBC: CPT | Performed by: INTERNAL MEDICINE

## 2024-09-08 PROCEDURE — 80048 BASIC METABOLIC PNL TOTAL CA: CPT | Performed by: INTERNAL MEDICINE

## 2024-09-08 PROCEDURE — 84100 ASSAY OF PHOSPHORUS: CPT | Performed by: INTERNAL MEDICINE

## 2024-09-08 PROCEDURE — 83735 ASSAY OF MAGNESIUM: CPT | Performed by: INTERNAL MEDICINE

## 2024-09-08 RX ADMIN — ACETAMINOPHEN 650 MG: 325 TABLET ORAL at 17:13

## 2024-09-08 RX ADMIN — Medication 10 ML: at 21:05

## 2024-09-08 RX ADMIN — BUDESONIDE AND FORMOTEROL FUMARATE DIHYDRATE 2 PUFF: 160; 4.5 AEROSOL RESPIRATORY (INHALATION) at 08:14

## 2024-09-08 RX ADMIN — ENOXAPARIN SODIUM 40 MG: 100 INJECTION SUBCUTANEOUS at 08:43

## 2024-09-08 RX ADMIN — GUAIFENESIN AND DEXTROMETHORPHAN HYDROBROMIDE 2 TABLET: 600; 30 TABLET, EXTENDED RELEASE ORAL at 08:43

## 2024-09-08 RX ADMIN — PROPRANOLOL HYDROCHLORIDE 40 MG: 20 TABLET ORAL at 21:04

## 2024-09-08 RX ADMIN — BUDESONIDE AND FORMOTEROL FUMARATE DIHYDRATE 2 PUFF: 160; 4.5 AEROSOL RESPIRATORY (INHALATION) at 21:40

## 2024-09-08 RX ADMIN — IPRATROPIUM BROMIDE AND ALBUTEROL SULFATE 3 ML: 2.5; .5 SOLUTION RESPIRATORY (INHALATION) at 21:40

## 2024-09-08 RX ADMIN — SENNOSIDES AND DOCUSATE SODIUM 2 TABLET: 50; 8.6 TABLET ORAL at 21:04

## 2024-09-08 RX ADMIN — AMLODIPINE BESYLATE 5 MG: 5 TABLET ORAL at 08:43

## 2024-09-08 RX ADMIN — IPRATROPIUM BROMIDE AND ALBUTEROL SULFATE 3 ML: 2.5; .5 SOLUTION RESPIRATORY (INHALATION) at 16:49

## 2024-09-08 RX ADMIN — IPRATROPIUM BROMIDE AND ALBUTEROL SULFATE 3 ML: 2.5; .5 SOLUTION RESPIRATORY (INHALATION) at 08:14

## 2024-09-08 RX ADMIN — PROPRANOLOL HYDROCHLORIDE 40 MG: 20 TABLET ORAL at 08:43

## 2024-09-08 RX ADMIN — GUAIFENESIN AND DEXTROMETHORPHAN HYDROBROMIDE 2 TABLET: 600; 30 TABLET, EXTENDED RELEASE ORAL at 21:04

## 2024-09-08 RX ADMIN — Medication 10 ML: at 08:44

## 2024-09-08 RX ADMIN — LOSARTAN POTASSIUM 100 MG: 50 TABLET, FILM COATED ORAL at 08:43

## 2024-09-08 RX ADMIN — IPRATROPIUM BROMIDE AND ALBUTEROL SULFATE 3 ML: 2.5; .5 SOLUTION RESPIRATORY (INHALATION) at 12:37

## 2024-09-08 RX ADMIN — AZITHROMYCIN DIHYDRATE 250 MG: 250 TABLET ORAL at 08:43

## 2024-09-08 RX ADMIN — SODIUM CHLORIDE 2000 MG: 900 INJECTION INTRAVENOUS at 17:13

## 2024-09-08 NOTE — PROGRESS NOTES
[facility] Medicine Services  PROGRESS NOTE    Patient Name: Beth Fregoso  : 1935  MRN: [id number]    Date of Admission: 2024  Primary Care Physician: Tripp Linder MD    Subjective   Subjective     CC:  Follow-up for community-acquired pneumonia    HPI:  Patient seen and examined this morning.  Patient is feeling much better.  I turned off the oxygen and her oxygen saturation remained around 92% on room air.  She is very nervous about going home today and was to wait till tomorrow to make some arrangement for someone to stay with her.  Minimal cough      Objective   Objective     Vital Signs:   Temp:  [98.5 °F (36.9 °C)-99.5 °F (37.5 °C)] 99 °F (37.2 °C)  Heart Rate:  [61-77] 62  Resp:  [18-19] 18  BP: (145-166)/(53-63) 163/56  Flow (L/min):  [2] 2     Physical Exam:  General: Comfortable, not in distress, conversant and cooperative  Head: Atraumatic and normocephalic  Eyes: No Icterus. No pallor  Ears:  Ears appear intact with no abnormalities noted  Throat: No oral lesions, no thrush  Neck: Supple, trachea midline  Lungs: Diminished air entry both lung fields.  Coarse crackles right lung base  Heart:  Normal S1 and S2, no murmur, no gallop, No JVD, no lower extremity swelling  Abdomen:  Soft, no tenderness, no organomegaly, normal bowel sounds, no organomegaly  Extremities: pulses equal bilaterally  Skin: No bleeding, bruising or rash, normal skin turgor and elasticity  Neurologic: Cranial nerves appear intact with no evidence of facial asymmetry, normal motor and sensory functions in all 4 extremities  Psych: Alert and oriented x 3, normal mood    Results Reviewed:  LAB RESULTS:      Lab 24  0451 24  1046 24  0348 24  0347 24  0432 24   WBC 9.56  --  11.83*  --  11.64* 13.73*   HEMOGLOBIN 11.4*  --  11.2*  --  11.2* 11.8*   HEMATOCRIT 34.5  --  33.8*  --  33.5* 34.6   PLATELETS 270  --  231  --  226 212   NEUTROS ABS  7.06*  --  9.33*  --   --  10.98*   IMMATURE GRANS (ABS) 0.05  --  0.05  --   --  0.05   LYMPHS ABS 1.17  --  1.04  --   --  1.22   MONOS ABS 1.06*  --  1.26*  --   --  1.40*   EOS ABS 0.20  --  0.14  --   --  0.06   MCV 87.8  --  86.2  --  86.1 86.1   PROCALCITONIN  --   --   --  0.07  --  0.10   LACTATE  --   --   --   --   --  0.9   D DIMER QUANT  --  1.65*  --   --   --   --          Lab 09/08/24  0452 09/07/24  1045 09/07/24  0347 09/06/24 0432 09/05/24 1959   SODIUM 134*  --  135* 133* 127*   POTASSIUM 4.3 3.9 3.3* 3.6 4.4   CHLORIDE 97*  --  96* 92* 90*   CO2 29.0  --  30.0* 27.0 27.0   ANION GAP 8.0  --  9.0 14.0 10.0   BUN 11  --  10 11 14   CREATININE 0.49*  --  0.52* 0.46* 0.57   EGFR 90.8  --  89.5 92.2 87.5   GLUCOSE 101*  --  111* 104* 112*   CALCIUM 8.4*  --  8.4* 8.5* 8.7   MAGNESIUM 2.3  --  2.3  --   --    PHOSPHORUS 3.2  --   --   --   --          Lab 09/07/24 0347 09/05/24 1959   TOTAL PROTEIN 5.7* 6.1   ALBUMIN 3.1* 3.6   GLOBULIN 2.6 2.5   ALT (SGPT) 9 8   AST (SGOT) 12 8   BILIRUBIN 0.4 0.8   ALK PHOS 60 68         Lab 09/05/24 1959   PROBNP 2,397.0*   HSTROP T 12                 Lab 09/05/24 2314   PH, ARTERIAL 7.504*   PCO2, ARTERIAL 38.1   PO2 ART 58.8*   FIO2 28   HCO3 ART 30.0*   BASE EXCESS ART 6.5*   CARBOXYHEMOGLOBIN 1.6     Brief Urine Lab Results       None            Microbiology Results Abnormal       Procedure Component Value - Date/Time    Blood Culture - Blood, Wrist, Right [[id number]]  (Normal) Collected: 09/05/24 2325    Lab Status: Preliminary result Specimen: Blood from Wrist, Right Updated: 09/07/24 2345     Blood Culture No growth at 2 days    Narrative:      Less than seven (7) mL's of blood was collected.  Insufficient quantity may yield false negative results.    Blood Culture - Blood, Arm, Right [[id number]]  (Normal) Collected: 09/05/24 2325    Lab Status: Preliminary result Specimen: Blood from Arm, Right Updated: 09/07/24 2345     Blood Culture No growth at 2  days    Narrative:      Less than seven (7) mL's of blood was collected.  Insufficient quantity may yield false negative results.    S. Pneumo Ag Urine or CSF - Urine, Urine, Clean Catch [[id number]]  (Normal) Collected: 09/06/24 0847    Lab Status: Final result Specimen: Urine, Clean Catch Updated: 09/06/24 1256     Strep Pneumo Ag Negative    Legionella Antigen, Urine - Urine, Urine, Random Void [[id number]]  (Normal) Collected: 09/06/24 0847    Lab Status: Final result Specimen: Urine, Random Void Updated: 09/06/24 1256     LEGIONELLA ANTIGEN, URINE Negative            Adult Transthoracic Echo Complete W/ Cont if Necessary Per Protocol    Result Date: 9/7/2024    Left ventricular systolic function is normal. Estimated left ventricular EF = 55%   Moderate aortic valve regurgitation is present.   Mild aortic valve stenosis is present.   Aortic valve maximum pressure gradient is 29 mmHg. Aortic valve mean pressure gradient is 18 mmHg.   Mild to moderate mitral valve regurgitation is present     CT Angiogram Chest Pulmonary Embolism    Result Date: 9/7/2024  CT ANGIOGRAM CHEST PULMONARY EMBOLISM Date of Exam: 9/7/2024 1:55 PM EDT Indication: Pulmonary embolism (PE) suspected, high prob Rule out PE. Comparison: Chest x-ray 9/5/2024 Technique: Axial CT images were obtained of the chest after the uneventful intravenous administration of 80 mL Isovue-370 utilizing pulmonary embolism protocol.  Reconstructed coronal and sagittal images were also obtained. Automated exposure control and  iterative construction methods were used. Findings: PULMONARY VASCULATURE: Pulmonary arteries are widely patent without evidence of embolus.Main pulmonary artery measures 4 cm prominent in size which can be seen with pulmonary hypertension. No evidence of right heart strain. MEDIASTINUM: The heart is prominent in size. The aorta is normal in caliber. No aortic dissection identified. No mass nor pericardial effusion. There is a 2 cm right  thyroid nodule. CORONARY ARTERIES: Mild calcified atherosclerotic disease. LUNGS: Evaluation of lung parenchyma demonstrates bibasilar airspace disease with more prominent areas of consolidation at the right lung base with air bronchograms. There our patchy groundglass infiltrates and septal prominence primarily in the lower lungs bilaterally. Linear atelectasis and/or scarring is noted within the lingula. PLEURAL SPACE: No effusion, mass, nor pneumothorax. LYMPH NODES: There are 1 cm mediastinal and hilar lymph nodes some of which are partially calcified. UPPER ABDOMEN: Unremarkable OSSEOUS STRUCTURES: There is multilevel thoracic spondylosis.     Impression: Impression: 1. No evidence for pulmonary embolus. 2. Bibasilar airspace disease with more prominent consolidation at the right lung base compatible with multifocal pneumonia. Electronically Signed: Sirisha Hargrove MD  9/7/2024 2:18 PM EDT  Workstation ID: [id number]     Results for orders placed during the hospital encounter of 09/05/24    Adult Transthoracic Echo Complete W/ Cont if Necessary Per Protocol    Interpretation Summary    Left ventricular systolic function is normal. Estimated left ventricular EF = 55%    Moderate aortic valve regurgitation is present.    Mild aortic valve stenosis is present.    Aortic valve maximum pressure gradient is 29 mmHg. Aortic valve mean pressure gradient is 18 mmHg.    Mild to moderate mitral valve regurgitation is present      Current medications:  Scheduled Meds:amLODIPine, 5 mg, Oral, Q24H  azithromycin, 250 mg, Oral, Q24H  budesonide-formoterol, 2 puff, Inhalation, BID - RT  cefTRIAXone, 2,000 mg, Intravenous, Q24H  enoxaparin, 40 mg, Subcutaneous, Daily  guaifenesin-dextromethorphan 600-30 mg, 2 tablet, Oral, Q12H  ipratropium-albuterol, 3 mL, Nebulization, 4x Daily - RT  losartan, 100 mg, Oral, Q24H  propranolol, 40 mg, Oral, BID  sodium chloride, 10 mL, Intravenous, Q12H      Continuous Infusions:   PRN Meds:.   acetaminophen    albuterol    ALPRAZolam    senna-docusate sodium **AND** polyethylene glycol **AND** bisacodyl **AND** bisacodyl    Calcium Replacement - Follow Nurse / BPA Driven Protocol    ipratropium-albuterol    Magnesium Standard Dose Replacement - Follow Nurse / BPA Driven Protocol    nitroglycerin    Phosphorus Replacement - Follow Nurse / BPA Driven Protocol    Potassium Replacement - Follow Nurse / BPA Driven Protocol    sodium chloride    sodium chloride    sodium chloride    Assessment & Plan   Assessment & Plan     Active Hospital Problems    Diagnosis  POA    **Pneumonia [J18.9]  Yes      Resolved Hospital Problems   No resolved problems to display.        Brief Hospital Course to date:  Beth Fregoso is a 88 y.o. female with history of essential hypertension, COPD, remote breast cancer who presented to the hospital with dyspnea and cough.  Found to have bilateral pneumonia    Bilateral community-acquired pneumonia  COPD with acute exacerbation  Rhinovirus infection  X-ray with bilateral multifocal pneumonia, worse on the right side  Respiratory panel positive for rhinovirus  Continue Rocephin and azithromycin  Continue Symbicort  DuoNebs PRN  D-dimer is elevated.  CTA chest negative for PE    Heart failure, ruled out   Echocardiogram with ejection fraction of 55%.    Moderate aortic regurg and mitral regurg     Essential hypertension  Continue amlodipine, losartan and propranolol    Hyponatremia  Mild and resolved.  Of no clinical significance        Expected Discharge Location and Transportation: home  Expected Discharge   Expected discharge date/ time has not been documented.     VTE Prophylaxis:  Pharmacologic VTE prophylaxis orders are present.         AM-PAC 6 Clicks Score (PT): 21 (09/07/24 0766)    CODE STATUS:   Code Status and Medical Interventions: CPR (Attempt to Resuscitate); Full Support   Ordered at: 09/06/24 0137     Level Of Support Discussed With:    Patient     Code Status  (Patient has no pulse and is not breathing):    CPR (Attempt to Resuscitate)     Medical Interventions (Patient has pulse or is breathing):    Full Support       Fanny Banegas MD  09/08/24

## 2024-09-08 NOTE — PLAN OF CARE
Goal Outcome Evaluation:  Plan of Care Reviewed With: patient        Progress: improving  Outcome Evaluation: Pt is A&O with VSS, NSR on the monitor, and on 2L NC. There are no complaints at this time.

## 2024-09-09 ENCOUNTER — APPOINTMENT (OUTPATIENT)
Dept: GENERAL RADIOLOGY | Facility: HOSPITAL | Age: 89
DRG: 194 | End: 2024-09-09
Payer: MEDICARE

## 2024-09-09 VITALS
HEIGHT: 61 IN | SYSTOLIC BLOOD PRESSURE: 142 MMHG | DIASTOLIC BLOOD PRESSURE: 57 MMHG | BODY MASS INDEX: 33.27 KG/M2 | TEMPERATURE: 97.8 F | WEIGHT: 176.2 LBS | HEART RATE: 64 BPM | OXYGEN SATURATION: 96 % | RESPIRATION RATE: 18 BRPM

## 2024-09-09 PROBLEM — J18.9 PNEUMONIA, UNSPECIFIED ORGANISM: Status: ACTIVE | Noted: 2024-09-09

## 2024-09-09 LAB
ANION GAP SERPL CALCULATED.3IONS-SCNC: 11 MMOL/L (ref 5–15)
BUN SERPL-MCNC: 9 MG/DL (ref 8–23)
BUN/CREAT SERPL: 20.9 (ref 7–25)
CALCIUM SPEC-SCNC: 9 MG/DL (ref 8.6–10.5)
CHLORIDE SERPL-SCNC: 98 MMOL/L (ref 98–107)
CO2 SERPL-SCNC: 26 MMOL/L (ref 22–29)
CREAT SERPL-MCNC: 0.43 MG/DL (ref 0.57–1)
DEPRECATED RDW RBC AUTO: 42.6 FL (ref 37–54)
EGFRCR SERPLBLD CKD-EPI 2021: 93.7 ML/MIN/1.73
ERYTHROCYTE [DISTWIDTH] IN BLOOD BY AUTOMATED COUNT: 13.6 % (ref 12.3–15.4)
GLUCOSE SERPL-MCNC: 96 MG/DL (ref 65–99)
HCT VFR BLD AUTO: 36 % (ref 34–46.6)
HGB BLD-MCNC: 11.9 G/DL (ref 12–15.9)
MCH RBC QN AUTO: 28.4 PG (ref 26.6–33)
MCHC RBC AUTO-ENTMCNC: 33.1 G/DL (ref 31.5–35.7)
MCV RBC AUTO: 85.9 FL (ref 79–97)
PLATELET # BLD AUTO: 303 10*3/MM3 (ref 140–450)
PMV BLD AUTO: 8.9 FL (ref 6–12)
POTASSIUM SERPL-SCNC: 4.6 MMOL/L (ref 3.5–5.2)
RBC # BLD AUTO: 4.19 10*6/MM3 (ref 3.77–5.28)
SODIUM SERPL-SCNC: 135 MMOL/L (ref 136–145)
WBC NRBC COR # BLD AUTO: 10.03 10*3/MM3 (ref 3.4–10.8)

## 2024-09-09 PROCEDURE — 99239 HOSP IP/OBS DSCHRG MGMT >30: CPT | Performed by: NURSE PRACTITIONER

## 2024-09-09 PROCEDURE — 25010000002 ENOXAPARIN PER 10 MG: Performed by: INTERNAL MEDICINE

## 2024-09-09 PROCEDURE — 85027 COMPLETE CBC AUTOMATED: CPT | Performed by: NURSE PRACTITIONER

## 2024-09-09 PROCEDURE — 80048 BASIC METABOLIC PNL TOTAL CA: CPT | Performed by: NURSE PRACTITIONER

## 2024-09-09 PROCEDURE — 71045 X-RAY EXAM CHEST 1 VIEW: CPT

## 2024-09-09 RX ORDER — AZITHROMYCIN 250 MG/1
250 TABLET, FILM COATED ORAL DAILY
Qty: 3 TABLET | Refills: 0 | Status: SHIPPED | OUTPATIENT
Start: 2024-09-09 | End: 2024-09-12

## 2024-09-09 RX ORDER — GUAIFENESIN AND DEXTROMETHORPHAN HYDROBROMIDE 600; 30 MG/1; MG/1
2 TABLET, EXTENDED RELEASE ORAL EVERY 12 HOURS SCHEDULED
Qty: 30 TABLET | Refills: 1 | Status: SHIPPED | OUTPATIENT
Start: 2024-09-09

## 2024-09-09 RX ORDER — ALBUTEROL SULFATE 0.83 MG/ML
2.5 SOLUTION RESPIRATORY (INHALATION) EVERY 6 HOURS PRN
Start: 2024-09-09

## 2024-09-09 RX ORDER — AMLODIPINE BESYLATE 5 MG/1
5 TABLET ORAL
Qty: 30 TABLET | Refills: 1 | Status: SHIPPED | OUTPATIENT
Start: 2024-09-10

## 2024-09-09 RX ORDER — IPRATROPIUM BROMIDE AND ALBUTEROL SULFATE 2.5; .5 MG/3ML; MG/3ML
3 SOLUTION RESPIRATORY (INHALATION) EVERY 4 HOURS PRN
Qty: 360 ML | Refills: 1 | Status: SHIPPED | OUTPATIENT
Start: 2024-09-09

## 2024-09-09 RX ORDER — CEFDINIR 300 MG/1
300 CAPSULE ORAL 2 TIMES DAILY
Qty: 6 CAPSULE | Refills: 0 | Status: SHIPPED | OUTPATIENT
Start: 2024-09-09

## 2024-09-09 RX ADMIN — ENOXAPARIN SODIUM 40 MG: 100 INJECTION SUBCUTANEOUS at 09:23

## 2024-09-09 RX ADMIN — GUAIFENESIN AND DEXTROMETHORPHAN HYDROBROMIDE 2 TABLET: 600; 30 TABLET, EXTENDED RELEASE ORAL at 09:23

## 2024-09-09 RX ADMIN — Medication 10 ML: at 09:24

## 2024-09-09 RX ADMIN — LOSARTAN POTASSIUM 100 MG: 50 TABLET, FILM COATED ORAL at 09:23

## 2024-09-09 RX ADMIN — AMLODIPINE BESYLATE 5 MG: 5 TABLET ORAL at 09:23

## 2024-09-09 RX ADMIN — PROPRANOLOL HYDROCHLORIDE 40 MG: 20 TABLET ORAL at 09:22

## 2024-09-09 RX ADMIN — AZITHROMYCIN DIHYDRATE 250 MG: 250 TABLET ORAL at 09:23

## 2024-09-09 NOTE — CASE MANAGEMENT/SOCIAL WORK
Continued Stay Note  [facility]     Patient Name: Beth Fregoso  MRN: [id number]  Today's Date: 9/9/2024    Admit Date: 9/5/2024    Plan: discharge plan   Discharge Plan       Row Name 09/09/24 1426       Plan    Plan discharge plan    Plan Comments Pt is requiring 2 L of O2 with exertion and does not use home O2 . PT is also recommending HH. Pt has no preference to a particular DME company for O2 and no preference to a perticular HH agency. A referral was made to [facility] for home O2 and spoke with Dhaval.  [facility] will deliver a portable O2 tank to pt's room prior to discharge.  A referral was made to [facility] for SN, PT and OT. CM is waiting to hear back from [facility] on referral.    Addendum: [facility] unable to see pt until Thurs. A referrals was made to [facility] and spoke with Kaitlin.    Final Discharge Disposition Code 06 - home with home health care                   Discharge Codes    No documentation.                 Expected Discharge Date and Time       Expected Discharge Date Expected Discharge Time    Sep 9, 2024               Mallorie Garber RN

## 2024-09-09 NOTE — CASE MANAGEMENT/SOCIAL WORK
Case Management Discharge Note      Final Note: Per medicall team, pt is medically ready for discharge today and the plan is home with HH arranged with [facility] for skilled nursing, PT and OT. Per Kaitlin(liason for [facility]), [facility] will be in contact with pt to arrange a HH visit tomorrow, Tues. Home O2 arranged through [facility] delivered a portable O2 tank to pt's room for transportation home. Once home, [facility] will set up home O2. Pt's family will provide transportation home. Pt is agreeable to dicharge plan and denies further discharge needs.         Selected Continued Care - Discharged on 9/9/2024 Admission date: 9/5/2024 - Discharge disposition: Home or Self Care      Destination    No services have been selected for the patient.                Durable Medical Equipment       Service Provider Selected Services Address Phone Fax Patient Preferred    [facility] [address] [id number] [phone] --              Dialysis/Infusion    No services have been selected for the patient.                Home Medical Care       Service Provider Selected Services Address Phone Fax Patient Preferred    [facility] [address], [address], [place] [id number] [phone] --              Therapy    No services have been selected for the patient.                Community Resources    No services have been selected for the patient.                Community & DME    No services have been selected for the patient.                         Final Discharge Disposition Code: 06 - home with home health care

## 2024-09-09 NOTE — DISCHARGE SUMMARY
[facility] Medicine Services  DISCHARGE SUMMARY    Patient Name: Beth Fregoso  : 1935  MRN: [id number]    Date of Admission: 2024 10:59 PM  Date of Discharge:  2024  Primary Care Physician: Tripp Linder MD    Consults       No orders found from 2024 to 2024.            Hospital Course     Active Hospital Problems    Diagnosis  POA    **Pneumonia [J18.9]  Yes    Pneumonia, unspecified organism [J18.9]  Yes      Resolved Hospital Problems   No resolved problems to display.        Hospital Course:  Beth Fregoso is a 88 y.o. female  with history of essential hypertension, COPD, remote breast cancer who presented to the hospital with dyspnea and cough.  Found to have bilateral pneumonia     Bilateral community-acquired pneumonia  COPD with acute exacerbation  Rhinovirus infection  X-ray with bilateral multifocal pneumonia, worse on the right side  Respiratory panel positive for rhinovirus  Continue Rocephin and azithromycin  Continue Symbicort  DuoNebs PRN  D-dimer is elevated.  CTA chest negative for PE  2024 Repeat CXR shows improvement  Walking sat ordered to evaluate need for oxygen at discharge  Keep pulmonary clinic appointment already scheduled later in the month  Make sure someone stays with patient over the next 3 nights     Heart failure, ruled out   Echocardiogram with ejection fraction of 55%.    Moderate aortic regurg and mitral regurg      Essential hypertension  Continue amlodipine, losartan and propranolol     Hyponatremia  Mild and resolved.  Of no clinical significance    Patient is medically ready for discharge home and will be transported by her daughter.  One of her 3 daughters will be staying with her for the next 3 nights.  Discharge follow-up recommendations and appointments are listed below and in the AVS.    Discharge Follow Up Recommendations for outpatient labs/diagnostics:  -Follow-up with your family doctor in 1  week  -Keep your pulmonary appointment scheduled for the end of September  -Keep your appointment with Dr. Isaacs that is scheduled for 9/16/2024  -Take azithromycin 250 mg daily until finished  -Take cefdinir 300 mg 2 times a day until gone  -Take Mucinex 1200 mg every 12 hours  -Use DuoNeb inhaler every 4 hours as needed for shortness of air or cough  -Use Albuterol inhaler as needed when you are not home  -Use Albuterol nebulizer as needed when you are home    Day of Discharge     HPI:   Patient sitting up in a chair playing games on iPad.  Patient states she lives alone, one of her daughters will be staying with her for the next 3 nights.  Explained to patient that she needs to take antibiotics as prescribed until gone.    Vital Signs:   Temp:  [97.8 °F (36.6 °C)-98.8 °F (37.1 °C)] 97.8 °F (36.6 °C)  Heart Rate:  [65-82] 82  Resp:  [16-20] 18  BP: (152-171)/(53-71) 171/71  Flow (L/min):  [1-2] 1    Physical Exam:  Constitutional: Alert, elderly appears younger than stated age female sitting up in a chair in NAD  ENT: Pink, moist mucous membranes   Respiratory: Nonlabored, symmetrical chest expansion, diminished in bases bilaterally (R>L), soft crackles in right base  Cardiovascular: RRR, no M/R/G  Gastrointestinal: Soft, NT, ND +BS  Musculoskeletal: HOFFMAN; no LE edema bilaterally  Neurologic: Oriented x4, strength symmetric in all extremities, follows all commands, CN II-XII intact, speech clear  Skin: No rashes on exposed skin  Psychiatric: Pleasant and cooperative; normal affect    Pertinent  and/or Most Recent Results     LAB RESULTS:      Lab 09/09/24  1034 09/08/24  0451 09/07/24  1046 09/07/24  0348 09/07/24  0347 09/06/24  0432 09/05/24 1959   WBC 10.03 9.56  --  11.83*  --  11.64* 13.73*   HEMOGLOBIN 11.9* 11.4*  --  11.2*  --  11.2* 11.8*   HEMATOCRIT 36.0 34.5  --  33.8*  --  33.5* 34.6   PLATELETS 303 270  --  231  --  226 212   NEUTROS ABS  --  7.06*  --  9.33*  --   --  10.98*   IMMATURE GRANS (ABS)   --  0.05  --  0.05  --   --  0.05   LYMPHS ABS  --  1.17  --  1.04  --   --  1.22   MONOS ABS  --  1.06*  --  1.26*  --   --  1.40*   EOS ABS  --  0.20  --  0.14  --   --  0.06   MCV 85.9 87.8  --  86.2  --  86.1 86.1   PROCALCITONIN  --   --   --   --  0.07  --  0.10   LACTATE  --   --   --   --   --   --  0.9   D DIMER QUANT  --   --  1.65*  --   --   --   --          Lab 09/09/24  1034 09/08/24  0452 09/07/24  1045 09/07/24 0347 09/06/24 0432 09/05/24 1959   SODIUM 135* 134*  --  135* 133* 127*   POTASSIUM 4.6 4.3 3.9 3.3* 3.6 4.4   CHLORIDE 98 97*  --  96* 92* 90*   CO2 26.0 29.0  --  30.0* 27.0 27.0   ANION GAP 11.0 8.0  --  9.0 14.0 10.0   BUN 9 11  --  10 11 14   CREATININE 0.43* 0.49*  --  0.52* 0.46* 0.57   EGFR 93.7 90.8  --  89.5 92.2 87.5   GLUCOSE 96 101*  --  111* 104* 112*   CALCIUM 9.0 8.4*  --  8.4* 8.5* 8.7   MAGNESIUM  --  2.3  --  2.3  --   --    PHOSPHORUS  --  3.2  --   --   --   --          Lab 09/07/24 0347 09/05/24 1959   TOTAL PROTEIN 5.7* 6.1   ALBUMIN 3.1* 3.6   GLOBULIN 2.6 2.5   ALT (SGPT) 9 8   AST (SGOT) 12 8   BILIRUBIN 0.4 0.8   ALK PHOS 60 68         Lab 09/05/24 1959   PROBNP 2,397.0*   HSTROP T 12                 Lab 09/05/24  2314   PH, ARTERIAL 7.504*   PCO2, ARTERIAL 38.1   PO2 ART 58.8*   FIO2 28   HCO3 ART 30.0*   BASE EXCESS ART 6.5*   CARBOXYHEMOGLOBIN 1.6     Brief Urine Lab Results       None          Microbiology Results (last 10 days)       Procedure Component Value - Date/Time    S. Pneumo Ag Urine or CSF - Urine, Urine, Clean Catch [[id number]]  (Normal) Collected: 09/06/24 0847    Lab Status: Final result Specimen: Urine, Clean Catch Updated: 09/06/24 1256     Strep Pneumo Ag Negative    Legionella Antigen, Urine - Urine, Urine, Random Void [[id number]]  (Normal) Collected: 09/06/24 0847    Lab Status: Final result Specimen: Urine, Random Void Updated: 09/06/24 1256     LEGIONELLA ANTIGEN, URINE Negative    Blood Culture - Blood, Wrist, Right [[id number]]   (Normal) Collected: 09/05/24 2325    Lab Status: Preliminary result Specimen: Blood from Wrist, Right Updated: 09/08/24 2345     Blood Culture No growth at 3 days    Narrative:      Less than seven (7) mL's of blood was collected.  Insufficient quantity may yield false negative results.    Blood Culture - Blood, Arm, Right [[id number]]  (Normal) Collected: 09/05/24 2325    Lab Status: Preliminary result Specimen: Blood from Arm, Right Updated: 09/08/24 2345     Blood Culture No growth at 3 days    Narrative:      Less than seven (7) mL's of blood was collected.  Insufficient quantity may yield false negative results.    Respiratory Panel PCR w/COVID-19(SARS-CoV-2) [facility] In-House, NP Swab in UTM/VTM, 2 HR TAT - Swab, Nasopharynx [[id number]]  (Abnormal) Collected: 09/05/24 2218    Lab Status: Final result Specimen: Swab from Nasopharynx Updated: 09/05/24 2320     ADENOVIRUS, PCR Not Detected     Coronavirus 229E Not Detected     Coronavirus HKU1 Not Detected     Coronavirus NL63 Not Detected     Coronavirus OC43 Not Detected     COVID19 Not Detected     Human Metapneumovirus Not Detected     Human Rhinovirus/Enterovirus Detected     Influenza A PCR Not Detected     Influenza B PCR Not Detected     Parainfluenza Virus 1 Not Detected     Parainfluenza Virus 2 Not Detected     Parainfluenza Virus 3 Not Detected     Parainfluenza Virus 4 Not Detected     RSV, PCR Not Detected     Bordetella pertussis pcr Not Detected     Bordetella parapertussis PCR Not Detected     Chlamydophila pneumoniae PCR Not Detected     Mycoplasma pneumo by PCR Not Detected    Narrative:      In the setting of a positive respiratory panel with a viral infection PLUS a negative procalcitonin without other underlying concern for bacterial infection, consider observing off antibiotics or discontinuation of antibiotics and continue supportive care. If the respiratory panel is positive for atypical bacterial infection (Bordetella  pertussis, Chlamydophila pneumoniae, or Mycoplasma pneumoniae), consider antibiotic de-escalation to target atypical bacterial infection.            XR Chest 1 View    Result Date: 9/9/2024  XR CHEST 1 VW Date of Exam: 9/9/2024 10:41 AM EDT Indication: increasing dyspnea Comparison: Chest CT 9/7/2024, chest radiograph 9/5/2024. Findings: Cardiomediastinal silhouette is unchanged. Bibasilar airspace disease appears slightly improved compared to prior radiograph. No sizable pleural effusion. No pneumothorax. Osseous structures are unchanged.     Impression: Slight radiographic improvement of patient's bibasilar pneumonia. No new or worsening findings. Electronically Signed: Evert Cantrell MD  9/9/2024 11:15 AM EDT  Workstation ID: [id number]    Adult Transthoracic Echo Complete W/ Cont if Necessary Per Protocol    Result Date: 9/7/2024    Left ventricular systolic function is normal. Estimated left ventricular EF = 55%   Moderate aortic valve regurgitation is present.   Mild aortic valve stenosis is present.   Aortic valve maximum pressure gradient is 29 mmHg. Aortic valve mean pressure gradient is 18 mmHg.   Mild to moderate mitral valve regurgitation is present     CT Angiogram Chest Pulmonary Embolism    Result Date: 9/7/2024  CT ANGIOGRAM CHEST PULMONARY EMBOLISM Date of Exam: 9/7/2024 1:55 PM EDT Indication: Pulmonary embolism (PE) suspected, high prob Rule out PE. Comparison: Chest x-ray 9/5/2024 Technique: Axial CT images were obtained of the chest after the uneventful intravenous administration of 80 mL Isovue-370 utilizing pulmonary embolism protocol.  Reconstructed coronal and sagittal images were also obtained. Automated exposure control and  iterative construction methods were used. Findings: PULMONARY VASCULATURE: Pulmonary arteries are widely patent without evidence of embolus.Main pulmonary artery measures 4 cm prominent in size which can be seen with pulmonary hypertension. No evidence of right heart  strain. MEDIASTINUM: The heart is prominent in size. The aorta is normal in caliber. No aortic dissection identified. No mass nor pericardial effusion. There is a 2 cm right thyroid nodule. CORONARY ARTERIES: Mild calcified atherosclerotic disease. LUNGS: Evaluation of lung parenchyma demonstrates bibasilar airspace disease with more prominent areas of consolidation at the right lung base with air bronchograms. There our patchy groundglass infiltrates and septal prominence primarily in the lower lungs bilaterally. Linear atelectasis and/or scarring is noted within the lingula. PLEURAL SPACE: No effusion, mass, nor pneumothorax. LYMPH NODES: There are 1 cm mediastinal and hilar lymph nodes some of which are partially calcified. UPPER ABDOMEN: Unremarkable OSSEOUS STRUCTURES: There is multilevel thoracic spondylosis.     Impression: 1. No evidence for pulmonary embolus. 2. Bibasilar airspace disease with more prominent consolidation at the right lung base compatible with multifocal pneumonia. Electronically Signed: Sirisha Hargrove MD  9/7/2024 2:18 PM EDT  Workstation ID: [id number]    XR Chest 1 View    Result Date: 9/5/2024  XR CHEST 1 VW Date of Exam: 9/5/2024 7:35 PM EDT Indication: SOA triage protocol Comparison: Chest radiograph 5/8/2017 Findings: Mild cardiomegaly. Aortic atherosclerotic disease. Patchy bibasilar airspace opacities which may also involve the middle lobe and lingula. Blunted costophrenic angles consistent with small effusions. Probable peribronchial thickening. No overt edema or pneumothorax. Degenerative related osseous changes.     Impression: Patchy multifocal airspace disease suspicious for pneumonia pneumonia or aspiration with small bilateral pleural effusions. Commend radiographic follow-up to resolution in 6 to 8 weeks. Electronically Signed: Korey Chanel MD  9/5/2024 7:57 PM EDT  Workstation ID: [id number]       Results for orders placed during the hospital encounter of  09/05/24    Adult Transthoracic Echo Complete W/ Cont if Necessary Per Protocol    Interpretation Summary    Left ventricular systolic function is normal. Estimated left ventricular EF = 55%    Moderate aortic valve regurgitation is present.    Mild aortic valve stenosis is present.    Aortic valve maximum pressure gradient is 29 mmHg. Aortic valve mean pressure gradient is 18 mmHg.    Mild to moderate mitral valve regurgitation is present      Plan for Follow-up of Pending Labs/Results:   Pending Labs       Order Current Status    Blood Culture - Blood, Arm, Right Preliminary result    Blood Culture - Blood, Wrist, Right Preliminary result          Discharge Details        Discharge Medications        New Medications        Instructions Start Date   azithromycin 250 MG tablet  Commonly known as: Zithromax   250 mg, Oral, Daily      cefdinir 300 MG capsule  Commonly known as: OMNICEF   300 mg, Oral, 2 Times Daily      guaifenesin-dextromethorphan 600-30 mg  MG tablet sustained-release 12 hour   2 tablets, Oral, Every 12 Hours Scheduled      ipratropium-albuterol 0.5-2.5 mg/3 ml nebulizer  Commonly known as: DUO-NEB   3 mL, Nebulization, Every 4 Hours PRN             Changes to Medications        Instructions Start Date   albuterol sulfate  (90 Base) MCG/ACT inhaler  Commonly known as: PROVENTIL HFA;VENTOLIN HFA;PROAIR HFA  What changed: Another medication with the same name was added. Make sure you understand how and when to take each.   2 puffs, Inhalation, Every 4 Hours PRN      albuterol (2.5 MG/3ML) 0.083% nebulizer solution  Commonly known as: PROVENTIL  What changed: You were already taking a medication with the same name, and this prescription was added. Make sure you understand how and when to take each.   2.5 mg, Nebulization, Every 6 Hours PRN      amLODIPine 5 MG tablet  Commonly known as: NORVASC  What changed: when to take this   5 mg, Oral, Every 24 Hours Scheduled   Start Date: September  10, 2024            Continue These Medications        Instructions Start Date   Breo Ellipta 200-25 MCG/ACT inhaler  Generic drug: Fluticasone Furoate-Vilanterol   No dose, route, or frequency recorded.      CALCIUM 1200 PO   calcium      CALCIUM PO   Oral      doxazosin 4 MG tablet  Commonly known as: CARDURA   4 mg, Oral, Nightly      FOLIC ACID PO   Oral      hydroCHLOROthiazide 25 MG tablet   25 mg, Oral, 3 Times Weekly, Half a tablet 3x weekly (Monday, Wednesday, & Friday)      multivitamin tablet tablet  Commonly known as: THERAGRAN   Oral      multivitamin with minerals tablet tablet   See Admin Instructions      olmesartan 40 MG tablet  Commonly known as: BENICAR   No dose, route, or frequency recorded.      propranolol 20 MG tablet  Commonly known as: INDERAL   40 mg, Oral, 2 Times Daily      VITAMIN D PO   Oral             Stop These Medications      ALPRAZolam 0.5 MG tablet  Commonly known as: XANAX     Dulera 100-5 MCG/ACT inhaler  Generic drug: mometasone-formoterol              Allergies   Allergen Reactions    Amoxicillin Rash         Discharge Disposition:  Home or Self Care    Diet:  Hospital:  Diet Order   Procedures    Diet: Regular/House; Fluid Consistency: Thin (IDDSI 0)       Diet Instructions       Diet: Regular/House Diet; Regular (IDDSI 7); Thin (IDDSI 0)      Discharge Diet: Regular/House Diet    Texture: Regular (IDDSI 7)    Fluid Consistency: Thin (IDDSI 0)             Activity:  Activity Instructions       Activity as Tolerated              Restrictions or Other Recommendations:  None       CODE STATUS:    Code Status and Medical Interventions: CPR (Attempt to Resuscitate); Full Support   Ordered at: 09/06/24 0133     Level Of Support Discussed With:    Patient     Code Status (Patient has no pulse and is not breathing):    CPR (Attempt to Resuscitate)     Medical Interventions (Patient has pulse or is breathing):    Full Support       Future Appointments   Date Time Provider Department  [place]   9/16/2024  8:40 AM Juan A Isaacs MD [facility]       Additional Instructions for the Follow-ups that You Need to Schedule       Discharge Follow-up with PCP   As directed       Currently Documented PCP:    Tripp Linder MD    PCP Phone Number:    [phone]     Follow Up Details: 1 week; please schedule appointment prior to patient's discharge        Discharge Follow-up with Specified Provider: Follow-up with Dr. Isaacs on 9/16/2024 as already scheduled   As directed      To: Follow-up with Dr. Isaacs on 9/16/2024 as already scheduled        Discharge Follow-up with Specified Provider: Keep your pulmonary appointment scheduled for the end of September   As directed      To: Keep your pulmonary appointment scheduled for the end of September                FELIX Sewell  09/09/24      Time Spent on Discharge:  I spent  45  minutes on this discharge activity which included: face-to-face encounter with the patient, reviewing the data in the system, coordination of the care with the nursing staff as well as consultants, documentation, and entering orders.

## 2024-09-10 ENCOUNTER — HOME HEALTH ADMISSION (OUTPATIENT)
Dept: HOME HEALTH SERVICES | Facility: HOME HEALTHCARE | Age: 89
End: 2024-09-10
Payer: MEDICARE

## 2024-09-10 LAB
BACTERIA SPEC AEROBE CULT: NORMAL
BACTERIA SPEC AEROBE CULT: NORMAL

## 2024-09-10 NOTE — DISCHARGE PLACEMENT REQUEST
"Belinda Llamas (89 y.o. Female)       Date of Birth   1935    Social Security Number       Address   [address] [place] [id number]    Home Phone   [phone]    MRN   [id number]       Restorationist   Scientology    Marital Status                               Admission Date   9/5/24    Admission Type   Emergency    Admitting Provider   Tonia Dunlap MD    Attending Provider       Department, Room/Bed   [facility] 6A, N607/1       Discharge Date   9/9/2024    Discharge Disposition   Home or Self Care    Discharge Destination                                 Attending Provider: (none)   Allergies: Amoxicillin    Isolation: None   Infection: Rhinovirus  (09/05/24)   Code Status: Prior    Ht: 154.9 cm (61\")   Wt: 79.9 kg (176 lb 3.2 oz)    Admission Cmt: None   Principal Problem: Pneumonia [J18.9]                   Active Insurance as of 9/5/2024       Primary Coverage       Payor Plan Insurance Group Employer/Plan Group    MEDICARE MEDICARE A & B        Payor Plan Address Payor Plan Phone Number Payor Plan Fax Number Effective Dates    [address] [phone]  9/1/2000 - None Entered    [facility] [id number]         Subscriber Name Subscriber Birth Date Member ID       BELINDA LLAMAS 1935 [id number]               Secondary Coverage       Payor Plan Insurance Group Employer/Plan Group    [id number]       Payor Plan Address Payor Plan Phone Number Payor Plan Fax Number Effective Dates    [address]   12/1/2016 - None Entered    [facility] [id number]         Subscriber Name Subscriber Birth Date Member ID       BELINDA LLAMAS 1935 [id number]                     Emergency Contacts        (Rel.) Home Phone Work Phone Mobile Phone    NAVEEN ALFONSO (Daughter) [phone] -- [phone]            Temp Pulse Resp BP Patient Position Device (Oxygen Therapy) Flow (L/min) SpO2    09/09/24 1400 -- -- -- -- -- room air " -- --   24 1210 -- -- -- -- -- room air -- 96    SpO2: After pt recovered from abulation, was able to remove O2 and be on room air at 24 1210   24 1205 97.8 (36.6) 64 18 142/57 Lying nasal cannula 2 94    SpO2: After ambulation pt required O2 vis nasal cannula to recover at 24 1205   24 1200 -- -- -- -- -- room air -- 88 Abnormal     SpO2: Pt resting right after ambulation at 24 1200   24 1158 -- -- -- -- -- room air -- 88 Abnormal     SpO2: During ambulation at 24 1158   24 1000 -- -- -- --              Discharge Summary        Faviola Naranjo APRN at 24 1209       Attestation signed by Tonia Dunlap MD at 09/10/24 1248    I have reviewed this documentation and agree.                      [facility] Medicine Services  DISCHARGE SUMMARY    Patient Name: Beth Fregoso  : 1935  MRN: [id number]    Date of Admission: 2024 10:59 PM  Date of Discharge:  2024  Primary Care Physician: Tripp Linder MD    Consults       No orders found from 2024 to 2024.            Hospital Course     Active Hospital Problems    Diagnosis  POA    **Pneumonia [J18.9]  Yes    Pneumonia, unspecified organism [J18.9]  Yes      Resolved Hospital Problems   No resolved problems to display.        Hospital Course:  Beth Fregoso is a 88 y.o. female  with history of essential hypertension, COPD, remote breast cancer who presented to the hospital with dyspnea and cough.  Found to have bilateral pneumonia     Bilateral community-acquired pneumonia  COPD with acute exacerbation  Rhinovirus infection  X-ray with bilateral multifocal pneumonia, worse on the right side  Respiratory panel positive for rhinovirus  Continue Rocephin and azithromycin  Continue Symbicort  DuoNebs PRN  D-dimer is elevated.  CTA chest negative for PE  2024 Repeat CXR shows improvement  Walking sat ordered to evaluate need for oxygen at  discharge  Keep pulmonary clinic appointment already scheduled later in the month  Make sure someone stays with patient over the next 3 nights     Heart failure, ruled out   Echocardiogram with ejection fraction of 55%.    Moderate aortic regurg and mitral regurg      Essential hypertension  Continue amlodipine, losartan and propranolol     Hyponatremia  Mild and resolved.  Of no clinical significance    Patient is medically ready for discharge home and will be transported by her daughter.  One of her 3 daughters will be staying with her for the next 3 nights.  Discharge follow-up recommendations and appointments are listed below and in the AVS.    Discharge Follow Up Recommendations for outpatient labs/diagnostics:  -Follow-up with your family doctor in 1 week  -Keep your pulmonary appointment scheduled for the end of September  -Keep your appointment with Dr. Isaacs that is scheduled for 9/16/2024  -Take azithromycin 250 mg daily until finished  -Take cefdinir 300 mg 2 times a day until gone  -Take Mucinex 1200 mg every 12 hours  -Use DuoNeb inhaler every 4 hours as needed for shortness of air or cough  -Use Albuterol inhaler as needed when you are not home  -Use Albuterol nebulizer as needed when you are home    Day of Discharge     HPI:   Patient sitting up in a chair playing games on iPad.  Patient states she lives alone, one of her daughters will be staying with her for the next 3 nights.  Explained to patient that she needs to take antibiotics as prescribed until gone.    Vital Signs:   Temp:  [97.8 °F (36.6 °C)-98.8 °F (37.1 °C)] 97.8 °F (36.6 °C)  Heart Rate:  [65-82] 82  Resp:  [16-20] 18  BP: (152-171)/(53-71) 171/71  Flow (L/min):  [1-2] 1    Physical Exam:  Constitutional: Alert, elderly appears younger than stated age female sitting up in a chair in NAD  ENT: Pink, moist mucous membranes   Respiratory: Nonlabored, symmetrical chest expansion, diminished in bases bilaterally (R>L), soft crackles in right  base  Cardiovascular: RRR, no M/R/G  Gastrointestinal: Soft, NT, ND +BS  Musculoskeletal: HOFFMAN; no LE edema bilaterally  Neurologic: Oriented x4, strength symmetric in all extremities, follows all commands, CN II-XII intact, speech clear  Skin: No rashes on exposed skin  Psychiatric: Pleasant and cooperative; normal affect    Pertinent  and/or Most Recent Results     LAB RESULTS:      Lab 09/09/24  1034 09/08/24  0451 09/07/24  1046 09/07/24  0348 09/07/24 0347 09/06/24 0432 09/05/24 1959   WBC 10.03 9.56  --  11.83*  --  11.64* 13.73*   HEMOGLOBIN 11.9* 11.4*  --  11.2*  --  11.2* 11.8*   HEMATOCRIT 36.0 34.5  --  33.8*  --  33.5* 34.6   PLATELETS 303 270  --  231  --  226 212   NEUTROS ABS  --  7.06*  --  9.33*  --   --  10.98*   IMMATURE GRANS (ABS)  --  0.05  --  0.05  --   --  0.05   LYMPHS ABS  --  1.17  --  1.04  --   --  1.22   MONOS ABS  --  1.06*  --  1.26*  --   --  1.40*   EOS ABS  --  0.20  --  0.14  --   --  0.06   MCV 85.9 87.8  --  86.2  --  86.1 86.1   PROCALCITONIN  --   --   --   --  0.07  --  0.10   LACTATE  --   --   --   --   --   --  0.9   D DIMER QUANT  --   --  1.65*  --   --   --   --          Lab 09/09/24  1034 09/08/24 0452 09/07/24  1045 09/07/24 0347 09/06/24 0432 09/05/24 1959   SODIUM 135* 134*  --  135* 133* 127*   POTASSIUM 4.6 4.3 3.9 3.3* 3.6 4.4   CHLORIDE 98 97*  --  96* 92* 90*   CO2 26.0 29.0  --  30.0* 27.0 27.0   ANION GAP 11.0 8.0  --  9.0 14.0 10.0   BUN 9 11  --  10 11 14   CREATININE 0.43* 0.49*  --  0.52* 0.46* 0.57   EGFR 93.7 90.8  --  89.5 92.2 87.5   GLUCOSE 96 101*  --  111* 104* 112*   CALCIUM 9.0 8.4*  --  8.4* 8.5* 8.7   MAGNESIUM  --  2.3  --  2.3  --   --    PHOSPHORUS  --  3.2  --   --   --   --          Lab 09/07/24  0347 09/05/24 1959   TOTAL PROTEIN 5.7* 6.1   ALBUMIN 3.1* 3.6   GLOBULIN 2.6 2.5   ALT (SGPT) 9 8   AST (SGOT) 12 8   BILIRUBIN 0.4 0.8   ALK PHOS 60 68         Lab 09/05/24 1959   PROBNP 2,397.0*   HSTROP T 12                 Lab  09/05/24 2314   PH, ARTERIAL 7.504*   PCO2, ARTERIAL 38.1   PO2 ART 58.8*   FIO2 28   HCO3 ART 30.0*   BASE EXCESS ART 6.5*   CARBOXYHEMOGLOBIN 1.6     Brief Urine Lab Results       None          Microbiology Results (last 10 days)       Procedure Component Value - Date/Time    S. Pneumo Ag Urine or CSF - Urine, Urine, Clean Catch [[id number]]  (Normal) Collected: 09/06/24 0847    Lab Status: Final result Specimen: Urine, Clean Catch Updated: 09/06/24 1256     Strep Pneumo Ag Negative    Legionella Antigen, Urine - Urine, Urine, Random Void [[id number]]  (Normal) Collected: 09/06/24 0847    Lab Status: Final result Specimen: Urine, Random Void Updated: 09/06/24 1256     LEGIONELLA ANTIGEN, URINE Negative    Blood Culture - Blood, Wrist, Right [[id number]]  (Normal) Collected: 09/05/24 2325    Lab Status: Preliminary result Specimen: Blood from Wrist, Right Updated: 09/08/24 2345     Blood Culture No growth at 3 days    Narrative:      Less than seven (7) mL's of blood was collected.  Insufficient quantity may yield false negative results.    Blood Culture - Blood, Arm, Right [[id number]]  (Normal) Collected: 09/05/24 2325    Lab Status: Preliminary result Specimen: Blood from Arm, Right Updated: 09/08/24 2345     Blood Culture No growth at 3 days    Narrative:      Less than seven (7) mL's of blood was collected.  Insufficient quantity may yield false negative results.    Respiratory Panel PCR w/COVID-19(SARS-CoV-2) [facility] In-House, NP Swab in [place], 2 HR TAT - Swab, Nasopharynx [[id number]]  (Abnormal) Collected: 09/05/24 2218    Lab Status: Final result Specimen: Swab from Nasopharynx Updated: 09/05/24 2320     ADENOVIRUS, PCR Not Detected     Coronavirus 229E Not Detected     Coronavirus HKU1 Not Detected     Coronavirus NL63 Not Detected     Coronavirus OC43 Not Detected     COVID19 Not Detected     Human Metapneumovirus Not Detected     Human Rhinovirus/Enterovirus Detected     Influenza A PCR  Not Detected     Influenza B PCR Not Detected     Parainfluenza Virus 1 Not Detected     Parainfluenza Virus 2 Not Detected     Parainfluenza Virus 3 Not Detected     Parainfluenza Virus 4 Not Detected     RSV, PCR Not Detected     Bordetella pertussis pcr Not Detected     Bordetella parapertussis PCR Not Detected     Chlamydophila pneumoniae PCR Not Detected     Mycoplasma pneumo by PCR Not Detected    Narrative:      In the setting of a positive respiratory panel with a viral infection PLUS a negative procalcitonin without other underlying concern for bacterial infection, consider observing off antibiotics or discontinuation of antibiotics and continue supportive care. If the respiratory panel is positive for atypical bacterial infection (Bordetella pertussis, Chlamydophila pneumoniae, or Mycoplasma pneumoniae), consider antibiotic de-escalation to target atypical bacterial infection.            XR Chest 1 View    Result Date: 9/9/2024  XR CHEST 1 VW Date of Exam: 9/9/2024 10:41 AM EDT Indication: increasing dyspnea Comparison: Chest CT 9/7/2024, chest radiograph 9/5/2024. Findings: Cardiomediastinal silhouette is unchanged. Bibasilar airspace disease appears slightly improved compared to prior radiograph. No sizable pleural effusion. No pneumothorax. Osseous structures are unchanged.     Impression: Slight radiographic improvement of patient's bibasilar pneumonia. No new or worsening findings. Electronically Signed: Evert Cantrell MD  9/9/2024 11:15 AM EDT  Workstation ID: [id number]    Adult Transthoracic Echo Complete W/ Cont if Necessary Per Protocol    Result Date: 9/7/2024    Left ventricular systolic function is normal. Estimated left ventricular EF = 55%   Moderate aortic valve regurgitation is present.   Mild aortic valve stenosis is present.   Aortic valve maximum pressure gradient is 29 mmHg. Aortic valve mean pressure gradient is 18 mmHg.   Mild to moderate mitral valve regurgitation is present     CT  Angiogram Chest Pulmonary Embolism    Result Date: 9/7/2024  CT ANGIOGRAM CHEST PULMONARY EMBOLISM Date of Exam: 9/7/2024 1:55 PM EDT Indication: Pulmonary embolism (PE) suspected, high prob Rule out PE. Comparison: Chest x-ray 9/5/2024 Technique: Axial CT images were obtained of the chest after the uneventful intravenous administration of 80 mL Isovue-370 utilizing pulmonary embolism protocol.  Reconstructed coronal and sagittal images were also obtained. Automated exposure control and  iterative construction methods were used. Findings: PULMONARY VASCULATURE: Pulmonary arteries are widely patent without evidence of embolus.Main pulmonary artery measures 4 cm prominent in size which can be seen with pulmonary hypertension. No evidence of right heart strain. MEDIASTINUM: The heart is prominent in size. The aorta is normal in caliber. No aortic dissection identified. No mass nor pericardial effusion. There is a 2 cm right thyroid nodule. CORONARY ARTERIES: Mild calcified atherosclerotic disease. LUNGS: Evaluation of lung parenchyma demonstrates bibasilar airspace disease with more prominent areas of consolidation at the right lung base with air bronchograms. There our patchy groundglass infiltrates and septal prominence primarily in the lower lungs bilaterally. Linear atelectasis and/or scarring is noted within the lingula. PLEURAL SPACE: No effusion, mass, nor pneumothorax. LYMPH NODES: There are 1 cm mediastinal and hilar lymph nodes some of which are partially calcified. UPPER ABDOMEN: Unremarkable OSSEOUS STRUCTURES: There is multilevel thoracic spondylosis.     Impression: 1. No evidence for pulmonary embolus. 2. Bibasilar airspace disease with more prominent consolidation at the right lung base compatible with multifocal pneumonia. Electronically Signed: Sirisha Hargrove MD  9/7/2024 2:18 PM EDT  Workstation ID: [id number]    XR Chest 1 View    Result Date: 9/5/2024  XR CHEST 1 VW Date of Exam: 9/5/2024 7:35 PM  EDT Indication: SOA triage protocol Comparison: Chest radiograph 5/8/2017 Findings: Mild cardiomegaly. Aortic atherosclerotic disease. Patchy bibasilar airspace opacities which may also involve the middle lobe and lingula. Blunted costophrenic angles consistent with small effusions. Probable peribronchial thickening. No overt edema or pneumothorax. Degenerative related osseous changes.     Impression: Patchy multifocal airspace disease suspicious for pneumonia pneumonia or aspiration with small bilateral pleural effusions. Commend radiographic follow-up to resolution in 6 to 8 weeks. Electronically Signed: Korey Chanel MD  9/5/2024 7:57 PM EDT  Workstation ID: [id number]       Results for orders placed during the hospital encounter of 09/05/24    Adult Transthoracic Echo Complete W/ Cont if Necessary Per Protocol    Interpretation Summary    Left ventricular systolic function is normal. Estimated left ventricular EF = 55%    Moderate aortic valve regurgitation is present.    Mild aortic valve stenosis is present.    Aortic valve maximum pressure gradient is 29 mmHg. Aortic valve mean pressure gradient is 18 mmHg.    Mild to moderate mitral valve regurgitation is present      Plan for Follow-up of Pending Labs/Results:   Pending Labs       Order Current Status    Blood Culture - Blood, Arm, Right Preliminary result    Blood Culture - Blood, Wrist, Right Preliminary result          Discharge Details        Discharge Medications        New Medications        Instructions Start Date   azithromycin 250 MG tablet  Commonly known as: Zithromax   250 mg, Oral, Daily      cefdinir 300 MG capsule  Commonly known as: OMNICEF   300 mg, Oral, 2 Times Daily      guaifenesin-dextromethorphan 600-30 mg  MG tablet sustained-release 12 hour   2 tablets, Oral, Every 12 Hours Scheduled      ipratropium-albuterol 0.5-2.5 mg/3 ml nebulizer  Commonly known as: DUO-NEB   3 mL, Nebulization, Every 4 Hours PRN             Changes to  Medications        Instructions Start Date   albuterol sulfate  (90 Base) MCG/ACT inhaler  Commonly known as: PROVENTIL HFA;VENTOLIN HFA;PROAIR HFA  What changed: Another medication with the same name was added. Make sure you understand how and when to take each.   2 puffs, Inhalation, Every 4 Hours PRN      albuterol (2.5 MG/3ML) 0.083% nebulizer solution  Commonly known as: PROVENTIL  What changed: You were already taking a medication with the same name, and this prescription was added. Make sure you understand how and when to take each.   2.5 mg, Nebulization, Every 6 Hours PRN      amLODIPine 5 MG tablet  Commonly known as: NORVASC  What changed: when to take this   5 mg, Oral, Every 24 Hours Scheduled   Start Date: September 10, 2024            Continue These Medications        Instructions Start Date   Breo Ellipta 200-25 MCG/ACT inhaler  Generic drug: Fluticasone Furoate-Vilanterol   No dose, route, or frequency recorded.      CALCIUM 1200 PO   calcium      CALCIUM PO   Oral      doxazosin 4 MG tablet  Commonly known as: CARDURA   4 mg, Oral, Nightly      FOLIC ACID PO   Oral      hydroCHLOROthiazide 25 MG tablet   25 mg, Oral, 3 Times Weekly, Half a tablet 3x weekly (Monday, Wednesday, & Friday)      multivitamin tablet tablet  Commonly known as: THERAGRAN   Oral      multivitamin with minerals tablet tablet   See Admin Instructions      olmesartan 40 MG tablet  Commonly known as: BENICAR   No dose, route, or frequency recorded.      propranolol 20 MG tablet  Commonly known as: INDERAL   40 mg, Oral, 2 Times Daily      VITAMIN D PO   Oral             Stop These Medications      ALPRAZolam 0.5 MG tablet  Commonly known as: XANAX     Dulera 100-5 MCG/ACT inhaler  Generic drug: mometasone-formoterol              Allergies   Allergen Reactions    Amoxicillin Rash         Discharge Disposition:  Home or Self Care    Diet:  Hospital:  Diet Order   Procedures    Diet: Regular/House; Fluid Consistency: Thin  (IDDSI 0)       Diet Instructions       Diet: Regular/House Diet; Regular (IDDSI 7); Thin (IDDSI 0)      Discharge Diet: Regular/House Diet    Texture: Regular (IDDSI 7)    Fluid Consistency: Thin (IDDSI 0)             Activity:  Activity Instructions       Activity as Tolerated              Restrictions or Other Recommendations:  None       CODE STATUS:    Code Status and Medical Interventions: CPR (Attempt to Resuscitate); Full Support   Ordered at: 09/06/24 0133     Level Of Support Discussed With:    Patient     Code Status (Patient has no pulse and is not breathing):    CPR (Attempt to Resuscitate)     Medical Interventions (Patient has pulse or is breathing):    Full Support       Future Appointments   Date Time Provider Department Center   9/16/2024  8:40 AM Juan A Isaacs MD [facility]       Additional Instructions for the Follow-ups that You Need to Schedule       Discharge Follow-up with PCP   As directed       Currently Documented PCP:    Tripp Linder MD    PCP Phone Number:    [phone]     Follow Up Details: 1 week; please schedule appointment prior to patient's discharge        Discharge Follow-up with Specified Provider: Follow-up with Dr. Isaacs on 9/16/2024 as already scheduled   As directed      To: Follow-up with Dr. Isaacs on 9/16/2024 as already scheduled        Discharge Follow-up with Specified Provider: Keep your pulmonary appointment scheduled for the end of September   As directed      To: Keep your pulmonary appointment scheduled for the end of September                FELIX Sewell  09/09/24      Time Spent on Discharge:  I spent  45  minutes on this discharge activity which included: face-to-face encounter with the patient, reviewing the data in the system, coordination of the care with the nursing staff as well as consultants, documentation, and entering orders.        Temp Pulse Resp BP Patient Position Device (Oxygen Therapy) Flow (L/min) SpO2    09/09/24 1400 -- --  -- -- -- room air -- --   09/09/24 1210 -- -- -- -- -- room air -- 96    SpO2: After pt recovered from abulation, was able to remove O2 and be on room air at 09/09/24 1210   09/09/24 1205 97.8 (36.6) 64 18 142/57 Lying nasal cannula 2 94    SpO2: After ambulation pt required O2 vis nasal cannula to recover at 09/09/24 1205   09/09/24 1200 -- -- -- -- -- room air -- 88 Abnormal     SpO2: Pt resting right after ambulation at 09/09/24 1200   09/09/24 1158 -- -- -- -- -- room air -- 88 Abnormal     SpO2: During ambulation at 09/09/24 1158   09/09/24 1000 -- -- -- --             Electronically signed by Tonia Dunlap MD at 09/10/24 8141

## 2024-10-21 ENCOUNTER — OFFICE VISIT (OUTPATIENT)
Dept: ORTHOPEDIC SURGERY | Facility: CLINIC | Age: 89
End: 2024-10-21
Payer: MEDICARE

## 2024-10-21 VITALS
HEIGHT: 61 IN | SYSTOLIC BLOOD PRESSURE: 126 MMHG | BODY MASS INDEX: 32.66 KG/M2 | WEIGHT: 173 LBS | DIASTOLIC BLOOD PRESSURE: 50 MMHG

## 2024-10-21 DIAGNOSIS — M16.12 PRIMARY OSTEOARTHRITIS OF LEFT HIP: Primary | ICD-10-CM

## 2024-10-21 PROCEDURE — 99212 OFFICE O/P EST SF 10 MIN: CPT | Performed by: ORTHOPAEDIC SURGERY

## 2024-10-21 NOTE — PROGRESS NOTES
[facility] [place] [facility] Note    Subjective     Chief Complaint   Patient presents with    Follow-up     2 month f/u; Left hip Fluro guided injection f/u         HPI    It has been 2  month(s) since Ms. Fregoso's last visit. She returns to clinic today for follow-up of left hip pain. The issue has been ongoing for 1 year(s). She rates her pain a 1/10 on the pain scale. Previous/current treatments: NSAIDS and steroid injection (last injection 8/8/24). Current symptoms: pain. The pain is worse with walking and standing; pain medication and/or NSAID improve the pain. Overall, she is doing better.  Good relief with the intra-articular hip injection, which is still providing relief.      I have reviewed the following portions of the patient's history and agree with: History of Present Illness and Review of Systems    Patient Active Problem List   Diagnosis    Pneumonia    Pneumonia, unspecified organism     Past Medical History:   Diagnosis Date    Breast cancer 2014    Left     Hx of radiation therapy 2014    Hypertension       Past Surgical History:   Procedure Laterality Date    BREAST BIOPSY Left 2014    BREAST LUMPECTOMY Left 2014    REDUCTION MAMMAPLASTY        Family History   Problem Relation Age of Onset    Breast cancer Neg Hx     Ovarian cancer Neg Hx      Social History     Socioeconomic History    Marital status:    Tobacco Use    Smoking status: Former     Current packs/day: 1.00     Average packs/day: 1 pack/day for 58.8 years (58.8 ttl pk-yrs)     Types: Cigarettes     Start date: 1966    Smokeless tobacco: Never   Vaping Use    Vaping status: Never Used   Substance and Sexual Activity    Alcohol use: Yes     Comment: SOCIAL    Drug use: No    Sexual activity: Defer      Current Outpatient Medications on File Prior to Visit   Medication Sig Dispense Refill    albuterol (PROVENTIL) (2.5 MG/3ML) 0.083% nebulizer solution Take 2.5 mg by nebulization Every 6 (Six) Hours As Needed for Wheezing  or Shortness of Air.      albuterol sulfate  (90 Base) MCG/ACT inhaler Inhale 2 puffs Every 4 (Four) Hours As Needed for Shortness of Air.      amLODIPine (NORVASC) 5 MG tablet Take 1 tablet by mouth Daily. 30 tablet 1    Breo Ellipta 200-25 MCG/ACT inhaler       Calcium Carbonate-Vit D-Min (CALCIUM 1200 PO) calcium      CALCIUM PO Take  by mouth.      cefdinir (OMNICEF) 300 MG capsule Take 1 capsule by mouth 2 (Two) Times a Day. 6 capsule 0    Cholecalciferol (VITAMIN D PO) Take  by mouth.      doxazosin (CARDURA) 4 MG tablet Take 1 tablet by mouth Every Night.      FOLIC ACID PO Take  by mouth.      guaifenesin-dextromethorphan 600-30 mg (MUCINEX DM)  MG tablet sustained-release 12 hour Take 2 tablets by mouth Every 12 (Twelve) Hours. 30 tablet 1    hydroCHLOROthiazide (HYDRODIURIL) 25 MG tablet Take 1 tablet by mouth 3 (Three) Times a Week. Half a tablet 3x weekly (Monday, Wednesday, & Friday)      ipratropium-albuterol (DUO-NEB) 0.5-2.5 mg/3 ml nebulizer Take 3 mL by nebulization Every 4 (Four) Hours As Needed for Shortness of Air. 360 mL 1    Multiple Vitamin (MULTI VITAMIN PO) Take  by mouth.      multivitamin with minerals tablet tablet See Admin Instructions.      olmesartan (BENICAR) 40 MG tablet       propranolol (INDERAL) 20 MG tablet Take 2 tablets by mouth 2 (Two) Times a Day.  3     No current facility-administered medications on file prior to visit.      Allergies   Allergen Reactions    Amoxicillin Rash    Penicillins Rash        Review of Systems   Constitutional:  Negative for activity change, appetite change, chills, diaphoresis, fatigue, fever and unexpected weight change.   HENT:  Negative for congestion, dental problem, drooling, ear discharge, ear pain, facial swelling, hearing loss, mouth sores, nosebleeds, postnasal drip, rhinorrhea, sinus pressure, sneezing, sore throat, tinnitus, trouble swallowing and voice change.    Eyes:  Negative for photophobia, pain, discharge, redness,  "itching and visual disturbance.   Respiratory:  Negative for apnea, cough, choking, chest tightness, shortness of breath, wheezing and stridor.    Cardiovascular:  Negative for chest pain, palpitations and leg swelling.   Gastrointestinal:  Negative for abdominal distention, abdominal pain, anal bleeding, blood in stool, constipation, diarrhea, nausea, rectal pain and vomiting.   Endocrine: Negative for cold intolerance, heat intolerance, polydipsia, polyphagia and polyuria.   Genitourinary:  Negative for decreased urine volume, difficulty urinating, dysuria, enuresis, flank pain, frequency, genital sores, hematuria and urgency.   Musculoskeletal:  Positive for arthralgias. Negative for back pain, gait problem, joint swelling, myalgias, neck pain and neck stiffness.   Skin:  Negative for color change, pallor, rash and wound.   Allergic/Immunologic: Negative for environmental allergies, food allergies and immunocompromised state.   Neurological:  Negative for dizziness, tremors, seizures, syncope, facial asymmetry, speech difficulty, weakness, light-headedness, numbness and headaches.   Hematological:  Negative for adenopathy. Does not bruise/bleed easily.   Psychiatric/Behavioral:  Negative for agitation, behavioral problems, confusion, decreased concentration, dysphoric mood, hallucinations, self-injury, sleep disturbance and suicidal ideas. The patient is not nervous/anxious and is not hyperactive.         Objective      Physical Exam  /50   Ht 154.9 cm (61\")   Wt 78.5 kg (173 lb)   BMI 32.69 kg/m²     Body mass index is 32.69 kg/m².         General:   Mental Status:  Alert   Appearance: Cooperative, in no acute distress   Build and Nutrition: Well-nourished well-developed female   Orientation: Alert and oriented to person, place and time   Posture: Normal   Gait: Nonantalgic    Lower Extremity:              Left Hip:                          Tenderness:    None                          Swelling:          " None                          Crepitus:          None                          Atrophy:           None                          Range of motion:        External Rotation:       30°                                                              Internal Rotation:        30°                                                              Flexion:                       100°                                                              Extension:                   0°           Instability:        None  Deformities:     None  Functional testing: Negative Stinchfield                          No leg length discrepancy    Imaging/Studies  Imaging Results (Last 24 Hours)       ** No results found for the last 24 hours. **          No new imaging today.    Assessment and Plan     Diagnoses and all orders for this visit:    1. Primary osteoarthritis of left hip (Primary)        1. Primary osteoarthritis of left hip          I reviewed my findings with the patient.  Her left hip responded well to the intra-articular hip injection, and she is doing well today.  I will see her back in 6 months, but I will be happy to see her back sooner for any problems.    Return in about 6 months (around 4/21/2025).      Juan A Isaacs MD  10/21/24  15:19 EDT    Dictated Utilizing Dragon Dictation

## 2024-10-24 ENCOUNTER — TRANSCRIBE ORDERS (OUTPATIENT)
Dept: ADMINISTRATIVE | Facility: HOSPITAL | Age: 89
End: 2024-10-24
Payer: MEDICARE

## 2024-10-24 DIAGNOSIS — Z12.31 VISIT FOR SCREENING MAMMOGRAM: Primary | ICD-10-CM

## 2024-11-22 ENCOUNTER — HOSPITAL ENCOUNTER (OUTPATIENT)
Dept: MAMMOGRAPHY | Facility: HOSPITAL | Age: 89
Discharge: HOME | End: 2024-11-22
Admitting: OBSTETRICS & GYNECOLOGY
Payer: MEDICARE

## 2024-11-22 DIAGNOSIS — Z12.31 VISIT FOR SCREENING MAMMOGRAM: ICD-10-CM

## 2024-11-22 PROCEDURE — 77067 SCR MAMMO BI INCL CAD: CPT

## 2024-11-22 PROCEDURE — 77063 BREAST TOMOSYNTHESIS BI: CPT

## 2025-03-18 PROBLEM — I67.4 ENCEPHALOPATHY, HYPERTENSIVE: Status: ACTIVE | Noted: 2025-03-18

## 2025-03-18 PROBLEM — C50.919 MALIGNANT TUMOR OF BREAST: Status: ACTIVE | Noted: 2022-04-18

## 2025-03-18 PROBLEM — I95.1 ORTHOSTATIC HYPOTENSION: Status: ACTIVE | Noted: 2023-12-27

## 2025-03-18 PROBLEM — F09 MILD COGNITIVE DISORDER: Status: ACTIVE | Noted: 2025-03-18

## 2025-03-18 PROBLEM — I10 HYPERTENSIVE DISORDER: Status: ACTIVE | Noted: 2023-12-27

## 2025-03-18 PROBLEM — R10.32 LEFT LOWER QUADRANT PAIN: Status: ACTIVE | Noted: 2023-12-27

## 2025-03-18 PROBLEM — H53.8 BLURRY VISION, BILATERAL: Status: ACTIVE | Noted: 2023-09-21

## 2025-03-18 PROBLEM — R10.2 SUPRAPUBIC PAIN: Status: ACTIVE | Noted: 2023-12-27

## 2025-03-18 PROBLEM — E87.8 DISEQUILIBRIUM SYNDROME: Status: ACTIVE | Noted: 2024-05-09

## 2025-03-18 PROBLEM — F41.9 ANXIETY DISORDER: Status: ACTIVE | Noted: 2022-04-18

## 2025-03-18 PROBLEM — H25.813 COMBINED FORMS OF AGE-RELATED CATARACT OF BOTH EYES: Status: ACTIVE | Noted: 2023-03-18

## 2025-03-18 PROBLEM — E78.5 HYPERLIPIDEMIA: Status: ACTIVE | Noted: 2022-04-18

## 2025-03-18 PROBLEM — H02.889 MEIBOMIAN GLAND DYSFUNCTION: Status: ACTIVE | Noted: 2022-08-08

## 2025-03-18 PROBLEM — H25.13 AGE-RELATED NUCLEAR CATARACT OF BOTH EYES: Status: ACTIVE | Noted: 2022-08-08

## 2025-03-18 PROBLEM — G25.0 ESSENTIAL TREMOR: Status: ACTIVE | Noted: 2022-04-18

## 2025-03-18 PROBLEM — R05.3 PERSISTENT COUGH: Status: ACTIVE | Noted: 2024-03-07

## 2025-03-18 PROBLEM — J44.9 CHRONIC OBSTRUCTIVE PULMONARY DISEASE: Status: ACTIVE | Noted: 2023-12-27

## 2025-03-18 PROBLEM — J45.909 ASTHMA: Status: ACTIVE | Noted: 2022-04-18

## 2025-03-18 PROBLEM — M48.061 SPINAL STENOSIS OF LUMBAR REGION: Status: ACTIVE | Noted: 2023-04-03

## 2025-03-18 PROBLEM — H52.213 IRREGULAR ASTIGMATISM OF BOTH EYES: Status: ACTIVE | Noted: 2023-09-21

## 2025-03-18 PROBLEM — I10 ESSENTIAL HYPERTENSION: Status: ACTIVE | Noted: 2022-04-18

## 2025-03-18 PROBLEM — K11.20 SIALOADENITIS OF SUBMANDIBULAR GLAND: Status: ACTIVE | Noted: 2022-07-20

## 2025-03-18 PROBLEM — K21.9 GASTROESOPHAGEAL REFLUX DISEASE WITHOUT ESOPHAGITIS: Status: ACTIVE | Noted: 2023-04-27

## 2025-03-18 PROBLEM — J44.1 CHRONIC ASTHMATIC BRONCHITIS WITH ACUTE EXACERBATION: Status: ACTIVE | Noted: 2022-04-18

## 2025-03-18 PROBLEM — H35.3132 INTERMEDIATE STAGE NONEXUDATIVE AGE-RELATED MACULAR DEGENERATION OF BOTH EYES: Status: ACTIVE | Noted: 2022-08-08

## 2025-03-26 PROBLEM — R91.8 MULTIPLE NODULES OF LUNG: Status: ACTIVE | Noted: 2025-02-26

## 2025-03-26 PROBLEM — Z01.419 NORMAL GYNECOLOGIC EXAMINATION: Status: ACTIVE | Noted: 2020-09-14

## 2025-03-26 PROBLEM — J84.9 INTERSTITIAL LUNG DISEASE: Status: ACTIVE | Noted: 2025-03-21

## 2025-03-26 PROBLEM — J45.909 REACTIVE AIRWAY DISEASE: Status: ACTIVE | Noted: 2025-02-26

## 2025-03-26 PROBLEM — I38 HEART VALVE DISORDER: Status: ACTIVE | Noted: 2025-03-05

## 2025-03-27 ENCOUNTER — LAB (OUTPATIENT)
Dept: LAB | Facility: HOSPITAL | Age: OVER 89
End: 2025-03-27
Payer: MEDICARE

## 2025-03-27 ENCOUNTER — HOSPITAL ENCOUNTER (OUTPATIENT)
Dept: GENERAL RADIOLOGY | Facility: HOSPITAL | Age: OVER 89
Discharge: HOME | End: 2025-03-27
Payer: MEDICARE

## 2025-03-27 ENCOUNTER — OFFICE VISIT (OUTPATIENT)
Dept: INTERNAL MEDICINE | Facility: CLINIC | Age: OVER 89
End: 2025-03-27
Payer: MEDICARE

## 2025-03-27 VITALS
TEMPERATURE: 97.9 F | SYSTOLIC BLOOD PRESSURE: 137 MMHG | HEIGHT: 59 IN | DIASTOLIC BLOOD PRESSURE: 64 MMHG | WEIGHT: 168.6 LBS | BODY MASS INDEX: 33.99 KG/M2 | OXYGEN SATURATION: 95 %

## 2025-03-27 DIAGNOSIS — I10 ESSENTIAL HYPERTENSION: ICD-10-CM

## 2025-03-27 DIAGNOSIS — R06.02 SOB (SHORTNESS OF BREATH): ICD-10-CM

## 2025-03-27 DIAGNOSIS — J44.9 CHRONIC OBSTRUCTIVE PULMONARY DISEASE, UNSPECIFIED COPD TYPE: ICD-10-CM

## 2025-03-27 DIAGNOSIS — E78.5 HYPERLIPIDEMIA LDL GOAL <100: ICD-10-CM

## 2025-03-27 DIAGNOSIS — R91.8 MULTIPLE NODULES OF LUNG: ICD-10-CM

## 2025-03-27 DIAGNOSIS — J84.9 INTERSTITIAL LUNG DISEASE: ICD-10-CM

## 2025-03-27 DIAGNOSIS — I10 ESSENTIAL HYPERTENSION: Primary | ICD-10-CM

## 2025-03-27 PROCEDURE — 71046 X-RAY EXAM CHEST 2 VIEWS: CPT

## 2025-03-27 PROCEDURE — 80053 COMPREHEN METABOLIC PANEL: CPT

## 2025-03-27 PROCEDURE — 80061 LIPID PANEL: CPT

## 2025-03-27 NOTE — PROGRESS NOTES
Subjective   Beth Fregoso is a 89 y.o. female.   Chief Complaint   Patient presents with    Hypertension    Hyperlipidemia    Anxiety    Establish Care    Shortness of Breath       History of Present Illness   Here to establish care. Chronic conditions: HTN, HLP, and COPD. HTN controlled with Norvasc, Cardura, Benicar and HCTZ.  HLP controlled with diet. COPD controlled with Breo.     Has RSV vaccine Feb and after this she had wheezing and SOA. Went to her PCP  was given steroid siena and antibiotic.  Continue With SOA.  Had CT of Chest in Jan  The following portions of the patient's history were reviewed and updated as appropriate: allergies, current medications, past family history, past medical history, past social history, past surgical history, and problem list.  Past Medical History:   Diagnosis Date    Breast cancer 2014    Left     Hx of radiation therapy 2014    Hypertension      Past Surgical History:   Procedure Laterality Date    BREAST BIOPSY Left 2014    BREAST LUMPECTOMY Left 2014    REDUCTION MAMMAPLASTY       Family History   Problem Relation Age of Onset    Breast cancer Neg Hx     Ovarian cancer Neg Hx      Social History     Socioeconomic History    Marital status:    Tobacco Use    Smoking status: Former     Current packs/day: 1.00     Average packs/day: 1 pack/day for 59.2 years (59.2 ttl pk-yrs)     Types: Cigarettes     Start date: 1966    Smokeless tobacco: Never   Vaping Use    Vaping status: Never Used   Substance and Sexual Activity    Alcohol use: Yes     Comment: SOCIAL    Drug use: No    Sexual activity: Not Currently     Partners: Male     Comment:  passed away       Review of Systems   Constitutional:  Negative for fatigue and fever.   Respiratory:  Positive for shortness of breath. Negative for cough.    Cardiovascular:  Negative for chest pain and leg swelling.   Gastrointestinal:  Negative for abdominal pain.   Genitourinary:  Negative for dysuria.  "  Psychiatric/Behavioral:  Negative for confusion.      /64   Temp 97.9 °F (36.6 °C) (Temporal)   Ht 151 cm (59.45\")   Wt 76.5 kg (168 lb 9.6 oz)   SpO2 95%   BMI 33.54 kg/m²     Objective   Physical Exam  Vitals reviewed.   Cardiovascular:      Rate and Rhythm: Normal rate and regular rhythm.   Pulmonary:      Effort: No respiratory distress.      Breath sounds: No wheezing.   Neurological:      Mental Status: She is alert.         Assessment & Plan   Diagnoses and all orders for this visit:    1. Essential hypertension (Primary)  -     Comprehensive Metabolic Panel; Future  Continue Cardura 4 mg po qd, and Norvasc 5 mg po qd. Takes HCTZ 3 times a week. No longer on Benicar.  2. Hyperlipidemia LDL goal <100  -     Lipid Panel; Future  Diet controlled  3. Chronic obstructive pulmonary disease, unspecified COPD type  Continue Breo and prn albuterol  -     Ambulatory Referral to Pulmonology    4. Interstitial lung disease  -     Cancel: Ambulatory Referral to Pulmonology  -     Ambulatory Referral to Pulmonology    5. Multiple nodules of lung  -     Cancel: Ambulatory Referral to Pulmonology  -     Ambulatory Referral to Pulmonology    6. SOB (shortness of breath)  -     XR Chest PA & Lateral; Future                 "

## 2025-03-28 ENCOUNTER — RESULTS FOLLOW-UP (OUTPATIENT)
Dept: INTERNAL MEDICINE | Facility: CLINIC | Age: OVER 89
End: 2025-03-28
Payer: MEDICARE

## 2025-03-28 ENCOUNTER — LAB (OUTPATIENT)
Dept: LAB | Facility: HOSPITAL | Age: OVER 89
End: 2025-03-28
Payer: MEDICARE

## 2025-03-28 ENCOUNTER — TELEPHONE (OUTPATIENT)
Dept: INTERNAL MEDICINE | Facility: CLINIC | Age: OVER 89
End: 2025-03-28
Payer: MEDICARE

## 2025-03-28 DIAGNOSIS — R06.02 SOB (SHORTNESS OF BREATH): Primary | ICD-10-CM

## 2025-03-28 DIAGNOSIS — R06.02 SOB (SHORTNESS OF BREATH): ICD-10-CM

## 2025-03-28 LAB
ALBUMIN SERPL-MCNC: 4 G/DL (ref 3.5–5.2)
ALBUMIN/GLOB SERPL: 1.8 G/DL
ALP SERPL-CCNC: 59 U/L (ref 39–117)
ALT SERPL W P-5'-P-CCNC: 17 U/L (ref 1–33)
ANION GAP SERPL CALCULATED.3IONS-SCNC: 13 MMOL/L (ref 5–15)
AST SERPL-CCNC: 19 U/L (ref 1–32)
BILIRUB SERPL-MCNC: 0.5 MG/DL (ref 0–1.2)
BUN SERPL-MCNC: 16 MG/DL (ref 8–23)
BUN/CREAT SERPL: 28.6 (ref 7–25)
CALCIUM SPEC-SCNC: 9 MG/DL (ref 8.6–10.5)
CHLORIDE SERPL-SCNC: 97 MMOL/L (ref 98–107)
CHOLEST SERPL-MCNC: 190 MG/DL (ref 0–200)
CO2 SERPL-SCNC: 26 MMOL/L (ref 22–29)
CREAT SERPL-MCNC: 0.56 MG/DL (ref 0.57–1)
EGFRCR SERPLBLD CKD-EPI 2021: 87.4 ML/MIN/1.73
GLOBULIN UR ELPH-MCNC: 2.2 GM/DL
GLUCOSE SERPL-MCNC: 97 MG/DL (ref 65–99)
HDLC SERPL-MCNC: 54 MG/DL (ref 40–60)
LDLC SERPL CALC-MCNC: 125 MG/DL (ref 0–100)
LDLC/HDLC SERPL: 2.29 {RATIO}
POTASSIUM SERPL-SCNC: 4.3 MMOL/L (ref 3.5–5.2)
PROT SERPL-MCNC: 6.2 G/DL (ref 6–8.5)
SODIUM SERPL-SCNC: 136 MMOL/L (ref 136–145)
TRIGL SERPL-MCNC: 61 MG/DL (ref 0–150)
VLDLC SERPL-MCNC: 11 MG/DL (ref 5–40)

## 2025-03-28 PROCEDURE — 83880 ASSAY OF NATRIURETIC PEPTIDE: CPT

## 2025-03-28 RX ORDER — FUROSEMIDE 20 MG/1
20 TABLET ORAL DAILY PRN
Qty: 30 TABLET | Refills: 0 | Status: CANCELLED | OUTPATIENT
Start: 2025-03-28

## 2025-03-28 RX ORDER — DOXYCYCLINE 100 MG/1
100 CAPSULE ORAL 2 TIMES DAILY
Qty: 20 CAPSULE | Refills: 0 | Status: SHIPPED | OUTPATIENT
Start: 2025-03-28

## 2025-03-28 RX ORDER — DOXYCYCLINE 100 MG/1
100 CAPSULE ORAL 2 TIMES DAILY
Qty: 180 CAPSULE | Refills: 0 | Status: CANCELLED | OUTPATIENT
Start: 2025-03-28

## 2025-03-28 RX ORDER — FUROSEMIDE 20 MG/1
20 TABLET ORAL DAILY PRN
Qty: 30 TABLET | Refills: 0 | Status: SHIPPED | OUTPATIENT
Start: 2025-03-28

## 2025-03-28 NOTE — TELEPHONE ENCOUNTER
Caller: Beth Fregoso    Relationship: Self    Best call back number: [phone]     Which medication are you concerned about: furosemide (Lasix) 20 MG tablet     What are your concerns: PATIENT STATES SHE SPOKE WITH DR. MCGOVERN NURSE REGARDING THIS MEDICATION THIS MORNING BUT SHE WOULD LIKE ADDITIONAL CLARIFICATION ON THE DIRECTIONS FOR THIS MEDICATION.               
Returned call to patient who had picked up the lasix this morning  Patient was concerned with the amount sent in being 30 when she had been advised to take one a day for the next three days and then to follow up with Dr Watkins  Advised patient to take one per day for the next three days and then to follow up with Dr Watkins as directed, and to at the follow up appointment ask if this is a medication that Dr Watkins wants patient to continue  Patient understanding and appreciative   
No

## 2025-03-28 NOTE — TELEPHONE ENCOUNTER
Spoke with patient and relayed both messages from provider  Scheduled patient for follow up appointment on Monday  Patient understanding and appreciative

## 2025-03-29 LAB — NT-PROBNP SERPL-MCNC: 3256 PG/ML (ref 0–1800)

## 2025-03-31 ENCOUNTER — LAB (OUTPATIENT)
Dept: LAB | Facility: HOSPITAL | Age: OVER 89
End: 2025-03-31
Payer: MEDICARE

## 2025-03-31 ENCOUNTER — OFFICE VISIT (OUTPATIENT)
Dept: INTERNAL MEDICINE | Facility: CLINIC | Age: OVER 89
End: 2025-03-31
Payer: MEDICARE

## 2025-03-31 VITALS
DIASTOLIC BLOOD PRESSURE: 50 MMHG | SYSTOLIC BLOOD PRESSURE: 144 MMHG | HEIGHT: 59 IN | TEMPERATURE: 98 F | WEIGHT: 167.6 LBS | BODY MASS INDEX: 33.79 KG/M2 | OXYGEN SATURATION: 95 %

## 2025-03-31 DIAGNOSIS — R60.0 LOWER EXTREMITY EDEMA: ICD-10-CM

## 2025-03-31 DIAGNOSIS — I34.0 MITRAL VALVE INSUFFICIENCY, UNSPECIFIED ETIOLOGY: ICD-10-CM

## 2025-03-31 DIAGNOSIS — R06.02 SOB (SHORTNESS OF BREATH): Primary | ICD-10-CM

## 2025-03-31 DIAGNOSIS — I35.1 AORTIC VALVE INSUFFICIENCY, ETIOLOGY OF CARDIAC VALVE DISEASE UNSPECIFIED: ICD-10-CM

## 2025-03-31 DIAGNOSIS — R06.02 SOB (SHORTNESS OF BREATH): ICD-10-CM

## 2025-03-31 PROCEDURE — 83880 ASSAY OF NATRIURETIC PEPTIDE: CPT

## 2025-03-31 PROCEDURE — 80053 COMPREHEN METABOLIC PANEL: CPT

## 2025-04-01 LAB
ALBUMIN SERPL-MCNC: 4 G/DL (ref 3.5–5.2)
ALBUMIN/GLOB SERPL: 2 G/DL
ALP SERPL-CCNC: 56 U/L (ref 39–117)
ALT SERPL W P-5'-P-CCNC: 12 U/L (ref 1–33)
ANION GAP SERPL CALCULATED.3IONS-SCNC: 9 MMOL/L (ref 5–15)
AST SERPL-CCNC: 17 U/L (ref 1–32)
BILIRUB SERPL-MCNC: 0.5 MG/DL (ref 0–1.2)
BUN SERPL-MCNC: 11 MG/DL (ref 8–23)
BUN/CREAT SERPL: 15.7 (ref 7–25)
CALCIUM SPEC-SCNC: 8.7 MG/DL (ref 8.6–10.5)
CHLORIDE SERPL-SCNC: 99 MMOL/L (ref 98–107)
CO2 SERPL-SCNC: 27 MMOL/L (ref 22–29)
CREAT SERPL-MCNC: 0.7 MG/DL (ref 0.57–1)
EGFRCR SERPLBLD CKD-EPI 2021: 82.8 ML/MIN/1.73
GLOBULIN UR ELPH-MCNC: 2 GM/DL
GLUCOSE SERPL-MCNC: 184 MG/DL (ref 65–99)
NT-PROBNP SERPL-MCNC: 4804 PG/ML (ref 0–1800)
POTASSIUM SERPL-SCNC: 4.1 MMOL/L (ref 3.5–5.2)
PROT SERPL-MCNC: 6 G/DL (ref 6–8.5)
SODIUM SERPL-SCNC: 135 MMOL/L (ref 136–145)

## 2025-04-03 ENCOUNTER — OFFICE VISIT (OUTPATIENT)
Dept: CARDIOLOGY | Facility: CLINIC | Age: OVER 89
End: 2025-04-03
Payer: MEDICARE

## 2025-04-03 ENCOUNTER — PRIOR AUTHORIZATION (OUTPATIENT)
Dept: INTERNAL MEDICINE | Facility: CLINIC | Age: OVER 89
End: 2025-04-03
Payer: MEDICARE

## 2025-04-03 VITALS
HEIGHT: 59 IN | DIASTOLIC BLOOD PRESSURE: 40 MMHG | SYSTOLIC BLOOD PRESSURE: 142 MMHG | BODY MASS INDEX: 33.85 KG/M2 | OXYGEN SATURATION: 94 % | HEART RATE: 65 BPM

## 2025-04-03 DIAGNOSIS — I35.0 NONRHEUMATIC AORTIC VALVE STENOSIS: ICD-10-CM

## 2025-04-03 DIAGNOSIS — I50.30 DIASTOLIC CONGESTIVE HEART FAILURE, UNSPECIFIED HF CHRONICITY: Primary | ICD-10-CM

## 2025-04-03 RX ORDER — ALBUTEROL SULFATE 0.83 MG/ML
SOLUTION RESPIRATORY (INHALATION)
Qty: 75 ML | Refills: 2 | Status: SHIPPED | OUTPATIENT
Start: 2025-04-03

## 2025-04-03 NOTE — PROGRESS NOTES
New Cardiology Patient Office Visit      Date: 2025  Patient Name: Beth Fregoso  : 1935   MRN: [id number]   PCP: Nakia Watkins DO   Referring Provider: Nakia Watkins DO     Chief Complaint:    Chief Complaint   Patient presents with    Aortic valve insufficiency     NP    Mitral valve insufficiency     NP       History of Present Illness: Beth Fregoso is a 89 y.o. female who is here today for multiple medical problems.    Her main complaint is shortness of breath.  She is starting having this problem in the last few days to the past few months.  She also starting having some swelling of her feet.  She has been hurting in her back also.  She is also worried why her diastolic blood pressure is low.  She tries to do everything on her own and mostly is able to do activities for her.    She denies any cardiac problem of any other sort.  She denies any snoring.  She denies any claudication.      Problem List   CARDIAC  Coronary Artery Disease:   High risk     Myocardium:   Echo, 2024: LVEF 55%  HFpEF     Valvular:   Moderate AI, mild AS, mild MR     Electrical:   NSR     Pericardium:   Normal     CARDIAC RISK FACTORS  Hypertension  Dyslipidemia  Tobacco Use: Former Smoker  Menopausal state - Post-menopausal    NON-CARDIAC  History of breast cancer  Arthritis  Back pain  Bilateral cataract  COPD    SURGERIES  Left breast lumpectomy  Mammoplasty    Subjective      Review of Systems:   Review of Systems   Respiratory:  Positive for cough, shortness of breath, wheezing and stridor. Negative for apnea, choking and chest tightness.    Cardiovascular:  Positive for leg swelling. Negative for chest pain and palpitations.       Medications:   Current Outpatient Medications   Medication Sig Dispense Refill    albuterol sulfate  (90 Base) MCG/ACT inhaler Inhale 2 puffs Every 4 (Four) Hours As Needed for Shortness of Air.      amLODIPine (NORVASC) 5 MG tablet Take 1 tablet by mouth  "Daily. 30 tablet 1    Breo Ellipta 200-25 MCG/ACT inhaler       doxazosin (CARDURA) 4 MG tablet Take 1 tablet by mouth Every Night.      doxycycline (VIBRAMYCIN) 100 MG capsule Take 1 capsule by mouth 2 (Two) Times a Day. 20 capsule 0    furosemide (Lasix) 20 MG tablet Take 1 tablet by mouth Daily As Needed (swelling). 30 tablet 0    guaifenesin-dextromethorphan 600-30 mg (MUCINEX DM)  MG tablet sustained-release 12 hour Take 2 tablets by mouth Every 12 (Twelve) Hours. (Patient taking differently: Take 2 tablets by mouth Every Night.) 30 tablet 1    hydroCHLOROthiazide (HYDRODIURIL) 25 MG tablet Take 1 tablet by mouth 3 (Three) Times a Week. Half a tablet 3x weekly (Monday, Wednesday, & Friday) (Patient taking differently: Take 1 tablet by mouth As Needed. Half a tablet 3x weekly (Monday, Wednesday, & Friday))      ipratropium-albuterol (DUO-NEB) 0.5-2.5 mg/3 ml nebulizer Take 3 mL by nebulization Every 4 (Four) Hours As Needed for Shortness of Air. 360 mL 1    propranolol (INDERAL) 20 MG tablet Take 2 tablets by mouth 2 (Two) Times a Day.  3    albuterol (PROVENTIL) (2.5 MG/3ML) 0.083% nebulizer solution INHALE 3 ML EVERY 4-6 HOURS BY NEBULIZATION ROUTE. 75 mL 2     No current facility-administered medications for this visit.       The following portions of the patient's history were reviewed and updated as appropriate: allergies, current medications, past family history, past medical history, past social history, past surgical history and problem list.    Objective   Vital Signs:   Vitals:    04/03/25 1424   BP: 142/40   BP Location: Right arm   Patient Position: Sitting   Cuff Size: Adult   Pulse: 65   SpO2: 94%   Height: 149.9 cm (59\")     Body mass index is 33.85 kg/m².     Physical Exam:  Constitutional:       General: Not in acute distress.     Appearance: Healthy appearance. Not in distress.     Neck:     JVP:Not elevated     Carotid artery: Normal    Pulmonary:      Effort: Pulmonary effort is normal. "      Breath sounds: Normal breath sounds. No wheezing. No rhonchi. No rales.     Cardiovascular:      Normal rate. Regular rhythm. Normal S1. Normal S2.      Murmurs: There is 3/6 systolic murmur.      No gallop. No click. No rub.     Abdominal:      General: Bowel sounds are normal.      Palpations: Abdomen is soft.      Tenderness: There is no abdominal tenderness.    Extremities:     Pulses:Normal radial and pedal pulses     Edema:no edema      Labs:  Lab Results   Component Value Date    GLUCOSE 184 (H) 03/31/2025    BUN 11 03/31/2025    CREATININE 0.70 03/31/2025    EGFRIFNONA 90 08/12/2019    BCR 15.7 03/31/2025    K 4.1 03/31/2025    CO2 27.0 03/31/2025    CALCIUM 8.7 03/31/2025    ALBUMIN 4.0 03/31/2025    AST 17 03/31/2025    ALT 12 03/31/2025     Lab Results   Component Value Date    WBC 10.03 09/09/2024    HGB 11.9 (L) 09/09/2024    HCT 36.0 09/09/2024    MCV 85.9 09/09/2024     09/09/2024     Lab Results   Component Value Date    CHOL 190 03/27/2025    TRIG 61 03/27/2025    HDL 54 03/27/2025     (H) 03/27/2025       ECG 12 Lead    Date/Time: 4/3/2025 3:05 PM  Performed by: Hanna Alanis MD    Authorized by: Hanna Alanis MD  Comparison: compared with previous ECG from 11/5/2024  Similar to previous ECG  Rhythm: sinus rhythm  Other findings: left ventricular hypertrophy with strain    Clinical impression: abnormal EKG          Smoking Cessation:   Tobacco Product History : Patient quit smoking in 1970 10:06 pack years     Advance Care Planning   ACP discussion was declined by the patient. Patient does not have an advance directive, declines further assistance.            Assessment / Plan      Assessment:   Diagnosis Plan   1. Diastolic congestive heart failure, unspecified HF chronicity        2. Nonrheumatic aortic valve stenosis             Plan:  I think her major problem at this time is a diastolic heart failure with fluid overload.  Have advised her to increase her Lasix to 60 mg  for 5 days.  I have also advised her to check her BMP in few days.  She has moderate aortic stenosis and regurgitation.  We will go ahead and check his aortic valve on her next visit.        Follow Up:   Return in about 6 weeks (around 5/15/2025).    Hanna Alanis MD

## 2025-04-03 NOTE — TELEPHONE ENCOUNTER
Last appointment: 3/31/2025  Next appointment: 8/11/2025    Last Refill: last filled by hospitalist. Ok to fill?          PATIENT WAITING FOR LAB ORDERS FROM DR. ALVA SCHULZ - PATIENT IS THERE NOW WAITING FOR ORDERS TO BE FAX     [phone]

## 2025-04-09 ENCOUNTER — LAB (OUTPATIENT)
Dept: LAB | Facility: HOSPITAL | Age: OVER 89
End: 2025-04-09
Payer: MEDICARE

## 2025-04-09 DIAGNOSIS — I50.30 DIASTOLIC CONGESTIVE HEART FAILURE, UNSPECIFIED HF CHRONICITY: ICD-10-CM

## 2025-04-09 LAB
ANION GAP SERPL CALCULATED.3IONS-SCNC: 10.6 MMOL/L (ref 5–15)
BUN SERPL-MCNC: 11 MG/DL (ref 8–23)
BUN/CREAT SERPL: 17.5 (ref 7–25)
CALCIUM SPEC-SCNC: 8.8 MG/DL (ref 8.6–10.5)
CHLORIDE SERPL-SCNC: 99 MMOL/L (ref 98–107)
CO2 SERPL-SCNC: 28.4 MMOL/L (ref 22–29)
CREAT SERPL-MCNC: 0.63 MG/DL (ref 0.57–1)
EGFRCR SERPLBLD CKD-EPI 2021: 84.9 ML/MIN/1.73
GLUCOSE SERPL-MCNC: 121 MG/DL (ref 65–99)
NT-PROBNP SERPL-MCNC: 2291 PG/ML (ref 0–1800)
POTASSIUM SERPL-SCNC: 3.6 MMOL/L (ref 3.5–5.2)
SODIUM SERPL-SCNC: 138 MMOL/L (ref 136–145)

## 2025-04-09 PROCEDURE — 83880 ASSAY OF NATRIURETIC PEPTIDE: CPT

## 2025-04-09 PROCEDURE — 80048 BASIC METABOLIC PNL TOTAL CA: CPT | Performed by: INTERNAL MEDICINE

## 2025-04-10 ENCOUNTER — RESULTS FOLLOW-UP (OUTPATIENT)
Dept: CARDIOLOGY | Facility: CLINIC | Age: OVER 89
End: 2025-04-10
Payer: MEDICARE

## 2025-04-10 NOTE — TELEPHONE ENCOUNTER
Spoke with patient regarding lab work, pt states she is doing much better and has very little swelling.

## 2025-04-10 NOTE — TELEPHONE ENCOUNTER
----- Message from Hanna Alanis sent at 4/10/2025 11:58 AM EDT -----  Is she feeling any better. She can continue her increase dose of lasix if still symptomatic  ----- Message -----  From: Lab, Background User  Sent: 4/9/2025   3:23 PM EDT  To: Hanna Alanis MD

## 2025-04-14 ENCOUNTER — TELEPHONE (OUTPATIENT)
Dept: INTERNAL MEDICINE | Facility: CLINIC | Age: OVER 89
End: 2025-04-14
Payer: MEDICARE

## 2025-04-14 DIAGNOSIS — J44.9 CHRONIC OBSTRUCTIVE PULMONARY DISEASE, UNSPECIFIED COPD TYPE: Primary | ICD-10-CM

## 2025-04-14 NOTE — TELEPHONE ENCOUNTER
[facility] called the office requesting a refill for the Breo-Ellipta 200-25MCG/ACT Inhaler for this patient.     Requested Pharmacy: [address]                                         [place], [place]                                              [id number]

## 2025-04-15 RX ORDER — FLUTICASONE FUROATE AND VILANTEROL TRIFENATATE 200; 25 UG/1; UG/1
1 POWDER RESPIRATORY (INHALATION)
Qty: 60 EACH | Refills: 1 | Status: SHIPPED | OUTPATIENT
Start: 2025-04-15

## 2025-04-24 RX ORDER — FUROSEMIDE 20 MG/1
20 TABLET ORAL DAILY PRN
Qty: 30 TABLET | Refills: 1 | Status: SHIPPED | OUTPATIENT
Start: 2025-04-24

## 2025-04-28 RX ORDER — DOXAZOSIN 4 MG/1
4 TABLET ORAL DAILY
Qty: 90 TABLET | Refills: 7 | OUTPATIENT
Start: 2025-04-28

## 2025-04-30 ENCOUNTER — TELEPHONE (OUTPATIENT)
Dept: INTERNAL MEDICINE | Facility: CLINIC | Age: OVER 89
End: 2025-04-30
Payer: MEDICARE

## 2025-04-30 RX ORDER — DOXAZOSIN 4 MG/1
4 TABLET ORAL DAILY
Qty: 90 TABLET | Refills: 7 | Status: SHIPPED | OUTPATIENT
Start: 2025-04-30

## 2025-04-30 NOTE — TELEPHONE ENCOUNTER
Patient called to confirm doxazosin (CARDURA) 4 MG had been sent in to pharmacy  Relayed that it was sent with 7 refill  Patient appreciative

## 2025-05-08 RX ORDER — FLUTICASONE FUROATE AND VILANTEROL TRIFENATATE 200; 25 UG/1; UG/1
1 POWDER RESPIRATORY (INHALATION)
Qty: 60 EACH | Refills: 1 | Status: CANCELLED | OUTPATIENT
Start: 2025-05-08

## 2025-05-08 RX ORDER — DOXAZOSIN 4 MG/1
4 TABLET ORAL DAILY
Qty: 90 TABLET | Refills: 0 | Status: CANCELLED | OUTPATIENT
Start: 2025-05-08

## 2025-05-08 RX ORDER — DOXYCYCLINE 100 MG/1
100 CAPSULE ORAL 2 TIMES DAILY
Qty: 180 CAPSULE | Refills: 0 | Status: CANCELLED | OUTPATIENT
Start: 2025-05-08

## 2025-05-08 RX ORDER — ALBUTEROL SULFATE 0.83 MG/ML
2.5 SOLUTION RESPIRATORY (INHALATION) EVERY 4 HOURS PRN
Qty: 75 ML | Refills: 2 | Status: CANCELLED | OUTPATIENT
Start: 2025-05-08

## 2025-05-08 RX ORDER — FUROSEMIDE 20 MG/1
20 TABLET ORAL DAILY PRN
Qty: 30 TABLET | Refills: 1 | Status: CANCELLED | OUTPATIENT
Start: 2025-05-08

## 2025-05-09 ENCOUNTER — OFFICE VISIT (OUTPATIENT)
Dept: INTERNAL MEDICINE | Facility: CLINIC | Age: OVER 89
End: 2025-05-09
Payer: MEDICARE

## 2025-05-09 VITALS
HEART RATE: 72 BPM | OXYGEN SATURATION: 95 % | SYSTOLIC BLOOD PRESSURE: 128 MMHG | HEIGHT: 59 IN | TEMPERATURE: 97.9 F | DIASTOLIC BLOOD PRESSURE: 46 MMHG | WEIGHT: 164.4 LBS | BODY MASS INDEX: 33.14 KG/M2

## 2025-05-09 DIAGNOSIS — J02.9 SORE THROAT: ICD-10-CM

## 2025-05-09 DIAGNOSIS — R05.1 ACUTE COUGH: Primary | ICD-10-CM

## 2025-05-09 LAB
EXPIRATION DATE: NORMAL
EXPIRATION DATE: NORMAL
FLUAV AG UPPER RESP QL IA.RAPID: NOT DETECTED
FLUBV AG UPPER RESP QL IA.RAPID: NOT DETECTED
INTERNAL CONTROL: NORMAL
INTERNAL CONTROL: NORMAL
Lab: NORMAL
Lab: NORMAL
S PYO AG THROAT QL: NEGATIVE
SARS-COV-2 AG UPPER RESP QL IA.RAPID: NOT DETECTED

## 2025-05-19 RX ORDER — PROPRANOLOL HYDROCHLORIDE 40 MG/1
40 TABLET ORAL EVERY 12 HOURS SCHEDULED
Qty: 180 TABLET | Refills: 1 | OUTPATIENT
Start: 2025-05-19

## 2025-05-20 ENCOUNTER — OFFICE VISIT (OUTPATIENT)
Dept: PULMONOLOGY | Facility: CLINIC | Age: OVER 89
End: 2025-05-20
Payer: MEDICARE

## 2025-05-20 VITALS
DIASTOLIC BLOOD PRESSURE: 68 MMHG | SYSTOLIC BLOOD PRESSURE: 124 MMHG | WEIGHT: 164 LBS | HEART RATE: 68 BPM | HEIGHT: 59 IN | BODY MASS INDEX: 33.06 KG/M2 | TEMPERATURE: 98 F | OXYGEN SATURATION: 94 % | RESPIRATION RATE: 16 BRPM

## 2025-05-20 DIAGNOSIS — R91.8 GROUND GLASS OPACITY PRESENT ON IMAGING OF LUNG: ICD-10-CM

## 2025-05-20 DIAGNOSIS — J45.909 IDIOPATHIC ASTHMA: ICD-10-CM

## 2025-05-20 DIAGNOSIS — J84.9 INTERSTITIAL LUNG DISEASE: ICD-10-CM

## 2025-05-20 DIAGNOSIS — R06.09 DOE (DYSPNEA ON EXERTION): Primary | ICD-10-CM

## 2025-05-20 DIAGNOSIS — I38 HEART VALVE DISORDER: ICD-10-CM

## 2025-05-20 RX ORDER — PROPRANOLOL HYDROCHLORIDE 40 MG/1
40 TABLET ORAL 2 TIMES DAILY
COMMUNITY
Start: 2025-05-19

## 2025-05-20 NOTE — PROGRESS NOTES
PULMONARY  NOTE    Chief Complaint     Abnormal chest CT, dyspnea on exertion, history of asthma, valvular heart disease    History of Present Illness     89-year-old female referred for abnormal chest imaging    She has a minimal/remote smoking history  She Mao that she has a lifelong history of asthma    She has dyspnea on exertion  She would have difficulty going up a flight of stairs without having to stop due to shortness of breath    She has been on Breo with albuterol for her dyspnea  She feels that the inhalers help    Recently saw Dr. Dockery at the [facility] in the pulmonary department  He obtained a CT scan of the chest in January  Those findings are as noted below    She has an intermittent cough, nonproductive   She has not had hemoptysis  No regular reflux symptoms or dysphagia symptoms  She is on no acid reducing medicine    Patient Active Problem List   Diagnosis    Age-related nuclear cataract of both eyes    Combined forms of age-related cataract of both eyes    Anxiety disorder    Blurry vision, bilateral    Chronic asthmatic bronchitis with acute exacerbation    Disequilibrium syndrome    Encephalopathy, hypertensive    Essential hypertension    Hypertensive disorder    Essential tremor    Gastroesophageal reflux disease without esophagitis    Hyperlipidemia LDL goal <100    Intermediate stage nonexudative age-related macular degeneration of both eyes    Irregular astigmatism of both eyes    Left lower quadrant pain    Malignant tumor of breast    Meibomian gland dysfunction    Mild cognitive disorder    Orthostatic hypotension    Persistent cough    Sialoadenitis of submandibular gland    Spinal stenosis of lumbar region    Suprapubic pain    Heart valve disorder (AS/AR/MR)    Asthma    Interstitial lung disease (Reticulation; no UIP)    Normal gynecologic examination    Personal history of malignant neoplasm of breast    SHEIKH (dyspnea on exertion) - multifactorial    Ground glass  nodules      Allergies   Allergen Reactions    Amoxicillin Rash    Penicillins Rash       Current Outpatient Medications:     albuterol (PROVENTIL) (2.5 MG/3ML) 0.083% nebulizer solution, INHALE 3 ML EVERY 4-6 HOURS BY NEBULIZATION ROUTE., Disp: 75 mL, Rfl: 2    albuterol sulfate  (90 Base) MCG/ACT inhaler, Inhale 2 puffs Every 4 (Four) Hours As Needed for Shortness of Air., Disp: , Rfl:     amLODIPine (NORVASC) 5 MG tablet, Take 1 tablet by mouth Daily., Disp: 30 tablet, Rfl: 1    Breo Ellipta 200-25 MCG/ACT inhaler, Inhale 1 puff Daily., Disp: 60 each, Rfl: 1    doxazosin (CARDURA) 4 MG tablet, TAKE 1 TABLET EVERY DAY BY MOUTH, Disp: 90 tablet, Rfl: 7    doxycycline (VIBRAMYCIN) 100 MG capsule, Take 1 capsule by mouth 2 (Two) Times a Day., Disp: 20 capsule, Rfl: 0    furosemide (LASIX) 20 MG tablet, TAKE 1 TABLET BY MOUTH DAILY AS NEEDED (SWELLING)., Disp: 30 tablet, Rfl: 1    guaifenesin-dextromethorphan 600-30 mg (MUCINEX DM)  MG tablet sustained-release 12 hour, Take 2 tablets by mouth Every 12 (Twelve) Hours. (Patient taking differently: Take 2 tablets by mouth Every Night.), Disp: 30 tablet, Rfl: 1    hydroCHLOROthiazide (HYDRODIURIL) 25 MG tablet, Take 1 tablet by mouth 3 (Three) Times a Week. Half a tablet 3x weekly (Monday, Wednesday, & Friday) (Patient taking differently: Take 1 tablet by mouth As Needed. Half a tablet 3x weekly (Monday, Wednesday, & Friday)), Disp: , Rfl:     ipratropium-albuterol (DUO-NEB) 0.5-2.5 mg/3 ml nebulizer, Take 3 mL by nebulization Every 4 (Four) Hours As Needed for Shortness of Air., Disp: 360 mL, Rfl: 1    propranolol (INDERAL) 20 MG tablet, Take 2 tablets by mouth 2 (Two) Times a Day., Disp: , Rfl: 3    propranolol (INDERAL) 40 MG tablet, Take 1 tablet by mouth 2 (Two) Times a Day., Disp: , Rfl:   MEDICATION LIST AND ALLERGIES REVIEWED.    Family History   Problem Relation Age of Onset    Heart attack Father          in his 60s    Breast cancer Neg Hx      "Ovarian cancer Neg Hx      Social History     Tobacco Use    Smoking status: Former     Current packs/day: 0.00     Average packs/day: 1 pack/day for 10.0 years (10.0 ttl pk-yrs)     Types: Cigarettes     Start date:      Quit date:      Years since quittin.4     Passive exposure: Past    Smokeless tobacco: Never   Vaping Use    Vaping status: Never Used   Substance Use Topics    Alcohol use: Yes     Comment: SOCIAL    Drug use: No     Social History     Social History Narrative    Minimal/remote smoking history     FAMILY AND SOCIAL HISTORY REVIEWED.    Review of Systems  IF PRESENT REFER TO SCANNED ROS SHEET FROM SAME DATE  OTHERWISE ROS OBTAINED AND NON-CONTRIBUTORY OVER HPI.    /68 (BP Location: Left arm, Patient Position: Sitting, Cuff Size: Adult)   Pulse 68   Temp 98 °F (36.7 °C)   Resp 16   Ht 149 cm (58.66\")   Wt 74.4 kg (164 lb)   SpO2 94% Comment: room air at resting  BMI 33.51 kg/m²   Physical Exam  Vitals and nursing note reviewed.   Constitutional:       General: She is not in acute distress.     Appearance: She is well-developed. She is not diaphoretic.   HENT:      Head: Normocephalic and atraumatic.   Neck:      Thyroid: No thyromegaly.   Cardiovascular:      Rate and Rhythm: Normal rate and regular rhythm.      Heart sounds: Murmur heard.   Pulmonary:      Effort: Pulmonary effort is normal.      Breath sounds: Normal breath sounds. No stridor.   Lymphadenopathy:      Cervical: No cervical adenopathy.      Upper Body:      Right upper body: No supraclavicular or epitrochlear adenopathy.      Left upper body: No supraclavicular or epitrochlear adenopathy.   Skin:     General: Skin is warm and dry.   Neurological:      Mental Status: She is alert.   Psychiatric:         Behavior: Behavior normal.         Results     CT scan [facility] the chest from [facility] reviewed on PACS  A right upper lobe and left lower lobe groundglass nodule  Some reticulation particularly in the " bases but no honeycombing or traction bronchiectasis    PFTs reveal preserved ratio impaired spirometry with no restriction and a reduced diffusion capacity the corrects to normal when adjusted for alveolar volume    Immunization History   Administered Date(s) Administered    Arexvy (RSV, Adults 60+ yrs) 02/26/2025    COVID-19 (PFIZER) BIVALENT 12+YRS 09/16/2022, 05/20/2023    COVID-19 (PFIZER) Purple Cap Monovalent 01/28/2021, 02/18/2021, 03/08/2021, 10/02/2021, 10/04/2021    COVID-19 (UNSPECIFIED) 11/22/2024    Covid-19 (Pfizer) Gray Cap Monovalent 04/16/2022    Fluzone High-Dose 65+YRS 09/26/2024    Fluzone High-Dose 65+yrs 10/28/2022, 10/14/2023    Influenza, Unspecified 10/01/2022    Pneumococcal Conjugate 13-Valent (PCV13) 08/01/2016    Pneumococcal Conjugate 20-Valent (PCV20) 04/03/2023    Pneumococcal Polysaccharide (PPSV23) 10/01/2014    Shingrix 04/27/2023, 09/14/2023    TD Preservative Free (Tenivac) 06/07/2022     Problem List       ICD-10-CM ICD-9-CM   1. SHEIKH (dyspnea on exertion) - multifactorial  R06.09 786.09   2. Asthma  J45.909 493.90   3. Ground glass nodules  R91.8 793.19   4. Interstitial lung disease (Reticulation; no UIP)  J84.9 515   5. Heart valve disorder (AS/AR/MR)  I38 424.90       Discussion     Reviewed her test results  We reviewed her chest imaging on PACS  She has some reticulation but no overt evidence of UIP/IPF or other ILD  She is not restricted on PFTs    She has a couple of groundglass nodules  I gave her literature on pulmonary nodules and management guidelines  Based on Fleischner Society guidelines I did recommend a 6-month follow-up CT scan of the chest  Will schedule that for July 2025    We reviewed her PFTs  I reassured her she does not have COPD  She could have asthma, however  I recommended that she remain on Breo with albuterol as needed    We reviewed her echocardiogram from last fall  She does have some valvular heart disease  I suspect that this is also  contributing factor to her dyspnea on exertion    She may benefit from pulmonary and/or cardiac rehabilitation    I will plan to see her back after her follow-up CT scan of the chest    Level of service justified based on 65 minutes spent in patient care on this date of service including, but not limited to: preparing to see the patient, obtaining and/or reviewing history, performing medically appropriate examination, ordering tests/medicine/procedures, independently interpreting results, documenting clinical information in EHR, and counseling/education of patient/family/caregiver (excluding time spent on other separate services such as performing procedures or test interpretation, if applicable). (Level 4 45-59 minutes; Level 5 60-74 minutes)      Watson Conway MD  Note electronically signed    CC: Nakia Watkins, DO

## 2025-05-21 DIAGNOSIS — R91.8 GROUND GLASS OPACITY PRESENT ON IMAGING OF LUNG: Primary | ICD-10-CM

## 2025-05-21 NOTE — PROGRESS NOTES
Follow-up Visit      Date: 2025  Patient Name: Beth Fregoso  : 1935   MRN: [id number]     Chief Complaint:    Chief Complaint   Patient presents with    Shortness of Breath       History of Present Illness: Beth Fregoso is a 89 y.o. female who is here today for follow-up on her shortness of breath.    We have increased her Lasix last time and she did not respond to that.  She only takes it when she needs to be taken.  Her swelling of the legs has improved a lot but her shortness of breath is still a little bit there.  She is able to do all the activities she wants to.  She denies any lower extremity edema.  She denies any paroxysmal nocturnal dyspnea.  She denies any other significant symptoms.      Problem List     CARDIAC  Coronary Artery Disease:   High risk     Myocardium:   Echo, 2024: LVEF 55%  HFpEF     Valvular:   Moderate AI, mild AS, mild MR     Electrical:   NSR     Pericardium:   Normal     CARDIAC RISK FACTORS  Hypertension  Dyslipidemia  3/2025  TG 61 HDL 54   Tobacco Use: Former Smoker  Menopausal state - Post-menopausal    NON-CARDIAC  History of breast cancer  Arthritis  Back pain  Bilateral cataract  COPD    SURGERIES  Left breast lumpectomy  Mammoplasty        Subjective      Review of Systems:   Review of Systems   Respiratory:  Positive for shortness of breath.    Cardiovascular: Negative.        Medications:     Current Outpatient Medications:     albuterol (PROVENTIL) (2.5 MG/3ML) 0.083% nebulizer solution, INHALE 3 ML EVERY 4-6 HOURS BY NEBULIZATION ROUTE., Disp: 75 mL, Rfl: 2    albuterol sulfate  (90 Base) MCG/ACT inhaler, Inhale 2 puffs Every 4 (Four) Hours As Needed for Shortness of Air., Disp: , Rfl:     amLODIPine (NORVASC) 5 MG tablet, Take 1 tablet by mouth Daily., Disp: 30 tablet, Rfl: 1    Breo Ellipta 200-25 MCG/ACT inhaler, Inhale 1 puff Daily., Disp: 60 each, Rfl: 1    doxazosin (CARDURA) 4 MG tablet, TAKE 1 TABLET EVERY DAY BY  "MOUTH, Disp: 90 tablet, Rfl: 7    doxycycline (VIBRAMYCIN) 100 MG capsule, Take 1 capsule by mouth 2 (Two) Times a Day., Disp: 20 capsule, Rfl: 0    furosemide (LASIX) 20 MG tablet, Take 1 tablet by mouth 2 (Two) Times a Day., Disp: 30 tablet, Rfl: 1    guaifenesin-dextromethorphan 600-30 mg (MUCINEX DM)  MG tablet sustained-release 12 hour, Take 2 tablets by mouth Every 12 (Twelve) Hours. (Patient taking differently: Take 2 tablets by mouth Every Night.), Disp: 30 tablet, Rfl: 1    ipratropium-albuterol (DUO-NEB) 0.5-2.5 mg/3 ml nebulizer, Take 3 mL by nebulization Every 4 (Four) Hours As Needed for Shortness of Air., Disp: 360 mL, Rfl: 1    propranolol (INDERAL) 40 MG tablet, Take 1 tablet by mouth 2 (Two) Times a Day., Disp: , Rfl:     Allergies:   Allergies   Allergen Reactions    Amoxicillin Rash    Penicillins Rash       Objective     Physical Exam:  Vitals:    05/22/25 1401   BP: 140/48   BP Location: Right arm   Patient Position: Sitting   Cuff Size: Adult   Pulse: 70   SpO2: 90%   Weight: 74.6 kg (164 lb 6.4 oz)   Height: 152.4 cm (60\")     Body mass index is 32.11 kg/m².    Constitutional:       General: Not in acute distress.     Appearance: Healthy appearance. Not in distress.     Neck:     JVP:Not elevated     Carotid artery: Normal    Pulmonary:      Effort: Pulmonary effort is normal.      Breath sounds: Normal breath sounds. No wheezing. No rhonchi. No rales.     Cardiovascular:      Normal rate. Regular rhythm. Normal S1. Normal S2.      Murmurs: There is no significant murmur.      No gallop. No click. No rub.     Abdominal:      General: Bowel sounds are normal.      Palpations: Abdomen is soft.      Tenderness: There is no abdominal tenderness.    Extremities:     Pulses:Normal radial and pedal pulses     Edema:no edema    Smoking Cessation:   Tobacco Product History : Patient quit smoking  years in 1970.    Lab Review:   Lab Results   Component Value Date    GLUCOSE 121 (H) 04/09/2025    " BUN 11 04/09/2025    CREATININE 0.63 04/09/2025    EGFRIFNONA 90 08/12/2019    BCR 17.5 04/09/2025    K 3.6 04/09/2025    CO2 28.4 04/09/2025    CALCIUM 8.8 04/09/2025    ALBUMIN 4.0 03/31/2025    AST 17 03/31/2025    ALT 12 03/31/2025     Lab Results   Component Value Date    WBC 10.03 09/09/2024    HGB 11.9 (L) 09/09/2024    HCT 36.0 09/09/2024    MCV 85.9 09/09/2024     09/09/2024           Assessment / Plan      Assessment:   Diagnosis Plan   1. Diastolic congestive heart failure, unspecified HF chronicity        2. Nonrheumatic aortic valve insufficiency        3. Mixed hyperlipidemia             Plan:  Patient still having symptoms of diastolic heart failure. I have advised her to start taking Lasix 20 mg daily.  If she does not get better I have advised her to take it twice a day.  Patient valvular disease of aortic stenosis and regurgitation has been stable and she is going to continue with her current medications.      Follow Up:       Return in about 6 months (around 11/22/2025).    Hanna Alanis MD

## 2025-05-22 ENCOUNTER — OFFICE VISIT (OUTPATIENT)
Dept: CARDIOLOGY | Facility: CLINIC | Age: OVER 89
End: 2025-05-22
Payer: MEDICARE

## 2025-05-22 VITALS
SYSTOLIC BLOOD PRESSURE: 140 MMHG | WEIGHT: 164.4 LBS | DIASTOLIC BLOOD PRESSURE: 48 MMHG | HEIGHT: 60 IN | BODY MASS INDEX: 32.28 KG/M2 | HEART RATE: 70 BPM | OXYGEN SATURATION: 90 %

## 2025-05-22 DIAGNOSIS — I50.30 DIASTOLIC CONGESTIVE HEART FAILURE, UNSPECIFIED HF CHRONICITY: Primary | ICD-10-CM

## 2025-05-22 DIAGNOSIS — E78.2 MIXED HYPERLIPIDEMIA: ICD-10-CM

## 2025-05-22 DIAGNOSIS — I35.1 NONRHEUMATIC AORTIC VALVE INSUFFICIENCY: ICD-10-CM

## 2025-05-22 PROCEDURE — 99214 OFFICE O/P EST MOD 30 MIN: CPT | Performed by: INTERNAL MEDICINE

## 2025-05-22 RX ORDER — FUROSEMIDE 20 MG/1
20 TABLET ORAL 2 TIMES DAILY
Qty: 30 TABLET | Refills: 1 | Status: SHIPPED | OUTPATIENT
Start: 2025-05-22

## 2025-05-27 DIAGNOSIS — J84.9 INTERSTITIAL LUNG DISEASE: Primary | ICD-10-CM

## 2025-06-03 ENCOUNTER — TELEPHONE (OUTPATIENT)
Dept: INTERNAL MEDICINE | Facility: CLINIC | Age: OVER 89
End: 2025-06-03

## 2025-06-06 RX ORDER — FUROSEMIDE 20 MG/1
20 TABLET ORAL DAILY PRN
Qty: 30 TABLET | Refills: 1 | OUTPATIENT
Start: 2025-06-06

## 2025-06-11 ENCOUNTER — TELEPHONE (OUTPATIENT)
Dept: INTERNAL MEDICINE | Facility: CLINIC | Age: OVER 89
End: 2025-06-11

## 2025-06-11 NOTE — TELEPHONE ENCOUNTER
Notified patient that she was up to date on her TDAP vaccine and she does not need a new one. She verbalized understanding.

## 2025-06-11 NOTE — TELEPHONE ENCOUNTER
PATIENT HAS CALLED REQUESTING A CALL BACK TO SEE IF IT IS OK FOR HER TO HAVE THE TDAP VACCINE. PATIENT WILL BE AROUND [facility]  IN JULY AND IS WANTING TO KNOW IF PCP THINKS IT IS OK FOR PATIENT TO HAVE THE TDAP VACCINE. PATIENT ALSO WANTS TO KNOW IF IT IS OK FOR HER TO GET THE SHOT AT THE OFFICE IF PCP APPROVES.    CALL BACK NUMBER [phone]

## 2025-06-12 RX ORDER — DOXAZOSIN 4 MG/1
4 TABLET ORAL DAILY
Qty: 90 TABLET | Refills: 0 | Status: CANCELLED | OUTPATIENT
Start: 2025-06-12

## 2025-06-12 RX ORDER — DOXYCYCLINE 100 MG/1
100 CAPSULE ORAL 2 TIMES DAILY
Qty: 180 CAPSULE | Refills: 0 | Status: CANCELLED | OUTPATIENT
Start: 2025-06-12

## 2025-06-13 ENCOUNTER — LAB (OUTPATIENT)
Dept: LAB | Facility: HOSPITAL | Age: OVER 89
End: 2025-06-13
Payer: MEDICARE

## 2025-06-13 ENCOUNTER — OFFICE VISIT (OUTPATIENT)
Dept: INTERNAL MEDICINE | Facility: CLINIC | Age: OVER 89
End: 2025-06-13
Payer: MEDICARE

## 2025-06-13 VITALS
BODY MASS INDEX: 33.02 KG/M2 | SYSTOLIC BLOOD PRESSURE: 134 MMHG | WEIGHT: 168.2 LBS | OXYGEN SATURATION: 98 % | TEMPERATURE: 98.2 F | HEIGHT: 60 IN | HEART RATE: 74 BPM | DIASTOLIC BLOOD PRESSURE: 56 MMHG

## 2025-06-13 DIAGNOSIS — I50.30 DIASTOLIC CONGESTIVE HEART FAILURE, UNSPECIFIED HF CHRONICITY: ICD-10-CM

## 2025-06-13 DIAGNOSIS — R05.9 COUGH IN ADULT: ICD-10-CM

## 2025-06-13 DIAGNOSIS — R06.09 DOE (DYSPNEA ON EXERTION): ICD-10-CM

## 2025-06-13 DIAGNOSIS — R06.09 DOE (DYSPNEA ON EXERTION): Primary | ICD-10-CM

## 2025-06-13 LAB
ALBUMIN SERPL-MCNC: 4.1 G/DL (ref 3.5–5.2)
ALBUMIN/GLOB SERPL: 1.6 G/DL
ALP SERPL-CCNC: 55 U/L (ref 39–117)
ALT SERPL W P-5'-P-CCNC: 15 U/L (ref 1–33)
ANION GAP SERPL CALCULATED.3IONS-SCNC: 10.7 MMOL/L (ref 5–15)
AST SERPL-CCNC: 20 U/L (ref 1–32)
BILIRUB SERPL-MCNC: 0.5 MG/DL (ref 0–1.2)
BUN SERPL-MCNC: 15 MG/DL (ref 8–23)
BUN/CREAT SERPL: 21.1 (ref 7–25)
CALCIUM SPEC-SCNC: 9.2 MG/DL (ref 8.6–10.5)
CHLORIDE SERPL-SCNC: 101 MMOL/L (ref 98–107)
CO2 SERPL-SCNC: 26.3 MMOL/L (ref 22–29)
CREAT SERPL-MCNC: 0.71 MG/DL (ref 0.57–1)
EGFRCR SERPLBLD CKD-EPI 2021: 81.4 ML/MIN/1.73
EXPIRATION DATE: NORMAL
EXPIRATION DATE: NORMAL
FLUAV AG UPPER RESP QL IA.RAPID: NOT DETECTED
FLUBV AG UPPER RESP QL IA.RAPID: NOT DETECTED
GLOBULIN UR ELPH-MCNC: 2.5 GM/DL
GLUCOSE SERPL-MCNC: 96 MG/DL (ref 65–99)
INTERNAL CONTROL: NORMAL
INTERNAL CONTROL: NORMAL
Lab: NORMAL
Lab: NORMAL
NT-PROBNP SERPL-MCNC: 3906 PG/ML (ref 0–1800)
POTASSIUM SERPL-SCNC: 4.4 MMOL/L (ref 3.5–5.2)
PROT SERPL-MCNC: 6.6 G/DL (ref 6–8.5)
S PYO AG THROAT QL: NEGATIVE
SARS-COV-2 AG UPPER RESP QL IA.RAPID: NOT DETECTED
SODIUM SERPL-SCNC: 138 MMOL/L (ref 136–145)

## 2025-06-13 PROCEDURE — 83880 ASSAY OF NATRIURETIC PEPTIDE: CPT

## 2025-06-13 PROCEDURE — 80053 COMPREHEN METABOLIC PANEL: CPT

## 2025-06-13 PROCEDURE — 36415 COLL VENOUS BLD VENIPUNCTURE: CPT

## 2025-06-13 NOTE — PROGRESS NOTES
"Subjective   Beth Fregoso is a 89 y.o. female.     History of Present Illness   SOA for over 6 months. Recent dx of CHF. On lasix which has helped some with the swelling.   The following portions of the patient's history were reviewed and updated as appropriate: allergies, current medications, past family history, past medical history, past social history, past surgical history, and problem list.    Review of Systems   Constitutional:  Negative for fever.   Respiratory:  Positive for cough and shortness of breath.    Cardiovascular:  Negative for chest pain and leg swelling.   Gastrointestinal:  Negative for abdominal pain.   Psychiatric/Behavioral:  Negative for dysphoric mood.    /56 (BP Location: Left arm, Patient Position: Sitting, Cuff Size: Adult)   Pulse 74   Temp 98.2 °F (36.8 °C) (Temporal)   Ht 152 cm (59.84\")   Wt 76.3 kg (168 lb 3.2 oz)   SpO2 98%   BMI 33.02 kg/m²     Objective   Physical Exam  Vitals reviewed.   Cardiovascular:      Rate and Rhythm: Normal rate and regular rhythm.   Pulmonary:      Effort: No respiratory distress.      Breath sounds: No wheezing.   Neurological:      Mental Status: She is alert.     Assessment & Plan   Diagnoses and all orders for this visit:    1. SHEIKH (dyspnea on exertion) - multifactorial (Primary)  -     Comprehensive Metabolic Panel; Future  -     proBNP; Future  Likely related to her CHF. Increase lsix to 2 tabs po qd x 5 days then one tab  2. Diastolic congestive heart failure, unspecified HF chronicity  -     Comprehensive Metabolic Panel; Future  -     proBNP; Future  3.Cough  Likely related CHF, Covid and flu neg               "

## 2025-06-16 ENCOUNTER — RESULTS FOLLOW-UP (OUTPATIENT)
Dept: INTERNAL MEDICINE | Facility: CLINIC | Age: OVER 89
End: 2025-06-16
Payer: MEDICARE

## 2025-06-18 RX ORDER — AMLODIPINE BESYLATE 5 MG/1
5 TABLET ORAL DAILY
Qty: 90 TABLET | Refills: 0 | OUTPATIENT
Start: 2025-06-18

## 2025-06-18 NOTE — TELEPHONE ENCOUNTER
Last filled by Faviola Naranjo APRN  amLODIPine (NORVASC) 5 MG  LOV:6/13/2025  NOV:08/11/2025 at 3:00 PM

## 2025-06-30 ENCOUNTER — CLINICAL SUPPORT (OUTPATIENT)
Dept: PULMONOLOGY | Facility: CLINIC | Age: OVER 89
End: 2025-06-30
Payer: MEDICARE

## 2025-06-30 DIAGNOSIS — J84.9 INTERSTITIAL LUNG DISEASE: Primary | ICD-10-CM

## 2025-06-30 PROCEDURE — G0239 OTH RESP PROC, GROUP: HCPCS | Performed by: INTERNAL MEDICINE

## 2025-06-30 NOTE — PROGRESS NOTES
[facility]  Pulmonary Rehabilitation  Daily Treatment Log    Name: Beth Fregoso : 1935 Date: 2025     Session: / approved: 18     Time In/Out: 1:30    Non-COPD Charges: S6232r1    Physician :Dr. Conway    Dx: J84.9   Non-COPD:     Primary Insurance: Medicare     Secondary Insurance: [place]     /64 / HR 67 / RR 20 / SpO2% 95    Auscultation:  []Clear   [x]Clear and Decreased  []Insp. Wheeze  []Exp. Wheeze     []Rhonchi   []Rales    Target Heart Rate Zone:   Max HR: 99    Exercise Sp02% Blood Pressure Heart Rate QUINN Scale/Fatigue   Treadmill: Speed:       Min:   % Grade:        Distance:            Bands:#:        Reps:    Min:   Color Band:            6MWT:Min: 6 Distance: 250  MET: 1.4  90  124/66  99  3   Step-ups:#:   Reps:     Min:             Cybex/Vision/Schwinn Bike:  Level:  Speed:  RPM:   Min:   Distance:        Seat:             Hand Weights: Pounds:5   Min:0.5   Curls#  16       Sets:1   Presses#    Sets:   Fly#            Sets:   Shoulder Shrugs#     Sets: Wings#       Sets:   Wrist Curls#   Sets:                      93  120/62  74  0   NuStep: Level:  METS:  Min:       SPM:   Total Steps:   Seat:      Arms:            Arm Ergometer: Level:  Min:   RPM:       Fwd:      Back:   Total Turns:            PLB / DB Spinner: Min:   P-Flex: Level:         Min:   IS: Volume:            Min:   Warm-up Stretches: Min:               Education and Training: [x]QUINN Scale  [x]PLB  [x]DB  []IMT  []IS  [x]Vital Signs   [x]Target HR Zone  [x]Exercise Vital Signs [] Safe Vital Signs  []Pulmonary Anatomy  []Lung Function  []Lung Disease Process  []Oxygen Use  []Oxygen Safety  []Pulmonary Meds  []Med Instruction  []Cough Technique  []Pulmonary Hygeine  []Infection Prevention  []Signs of Infection  []When to Call MD  []Nutrition  []Weight Management  []Advance Directives  []Posture  []Body Mechanics [x]Energy Conservation  [x]Pacing ADL's  []Stress/Anxiety Management   [x]Stretching  [x]Home Exercise  []Smoking Cessation   []MS Progress  []Maintenance Exercise Program    []Pulmonary Knowledge Test/Education  []Review PFT  []Review Specific Disease  [x]Review Home Follow-through Compliance  [x]Safe Exercise [x]Exercise Progression Strategies    Progress Report: Patient was here for initial evaluation for pulmonary rehabilitation. Patient has mild SOA.Patient was monitored 15 minutes for the 6MWT and 20 minutes for the exercise test assessments. Her gait and balance are normal. Patient goals are to improve her overall health. Clinical evaluation tests were performed along with questionnaires. Pulmonary rehab guidelines were explained and a welcome packet was given. An incentive spirometer and inspiratory muscle  were given along with instructions and a demonstration for use. Home exercise was discussed and a home exercise packet was given to the patient.                 Treating Clinicians: [x] Miroslava Heath RRT MD in Office: []BOLIVAR Maravilla  []VALENTINA García  []PAIGE Olmstead  []JR Conway    []ALVERTO Cook []DEIRDRE Stone  []VALENTINA Fong  []VALENTINA Chew  []LILIAN Lara []TATYANA Diaz   []ALVERTO Galvan  []BOLIVAR Prado []PAIGE Nice []PAIGE Santo [x]SHAWN Betts [x]TATYANA Baires []ALVERTO Valenzuela

## 2025-07-01 DIAGNOSIS — J44.9 CHRONIC OBSTRUCTIVE PULMONARY DISEASE, UNSPECIFIED COPD TYPE: ICD-10-CM

## 2025-07-01 RX ORDER — FLUTICASONE FUROATE AND VILANTEROL TRIFENATATE 200; 25 UG/1; UG/1
1 POWDER RESPIRATORY (INHALATION) DAILY
Qty: 60 EACH | Refills: 1 | Status: SHIPPED | OUTPATIENT
Start: 2025-07-01

## 2025-07-07 ENCOUNTER — CLINICAL SUPPORT (OUTPATIENT)
Dept: PULMONOLOGY | Facility: CLINIC | Age: OVER 89
End: 2025-07-07
Payer: MEDICARE

## 2025-07-07 DIAGNOSIS — J84.9 INTERSTITIAL LUNG DISEASE: Primary | ICD-10-CM

## 2025-07-07 PROCEDURE — G0239 OTH RESP PROC, GROUP: HCPCS | Performed by: INTERNAL MEDICINE

## 2025-07-08 NOTE — PROGRESS NOTES
[facility]  Pulmonary Rehabilitation  Daily Treatment Log    Name: Beth Fregoso : 1935 Date: 2025     Session:2 / approved: 18     Time In/Out:2 3:30    Non-COPD Charges: I2064k9    Physician :Dr. Conway    Dx: J84.9   Non-COPD:     Primary Insurance: Medicare     Secondary Insurance: [place]     /62 / HR 75 / RR 18 / SpO2% 92    Auscultation:  []Clear   [x]Clear and Decreased  []Insp. Wheeze  []Exp. Wheeze     []Rhonchi   []Rales    Target Heart Rate Zone:   Max HR: 75    Exercise Sp02% Blood Pressure Heart Rate QUINN Scale/Fatigue   Treadmill: Speed:       Min:   % Grade:        Distance:            Bands:#:        Reps:    Min:   Color Band:            6MWT:Min:  Distance:   MET:            Step-ups:#:   Reps:     Min:             Cybex/Vision/Schwinn Bike:  Level: Speed:  RPM:   Min:   Distance:       Seat:             Hand Weights: Pounds:   Min:   Curls#         Sets:   Presses#    Sets:   Fly#            Sets:   Shoulder Shrugs#     Sets:   Wings#       Sets:   Wrist Curls#   Sets:                               NuStep: Level:1  METS:1.8  Min:15       SPM:59   Total Steps:965   Seat: 8     Arms:10   92  126/60  73  0.5   Arm Ergometer: Level:  Min:   RPM:       Fwd:      Back:   Total Turns:            PLB / DB : Min:10   P-Flex: Level:         Min:   IS: Volume:             Min:   Warm-up Stretches: Min: 10              Education and Training: [x]QUINN Scale  [x]PLB  [x]DB  []IMT  []IS  [x]Vital Signs   [x]Target HR Zone  [x]Exercise Vital Signs [x] Safe Vital Signs  []Pulmonary Anatomy  []Lung Function  []Lung Disease Process  []Oxygen Use  []Oxygen Safety  []Pulmonary Meds  []Med Instruction  []Cough Technique  []Pulmonary Hygeine  []Infection Prevention  []Signs of Infection  []When to Call MD  []Nutrition  []Weight Management  []Advance Directives  []Posture  []Body Mechanics [x]Energy Conservation  [x]Pacing ADL's  []Stress/Anxiety Management  [x]Stretching   [x]Home Exercise  []Smoking Cessation   [x]AR Progress  []Maintenance Exercise Program    [x]Pulmonary Knowledge Test/Education  []Review PFT  []Review Specific Disease  [x]Review Home Follow-through Compliance  [x]Safe Exercise [x]Exercise Progression Strategies    Progress Report: Patient was here for continuation of pulmonary rehabilitation. Patient was able to maintain a safe oxygen saturation throughout exercise.Patient was able to maintain SpO2% greater than or equal to 90 on RA. . Pursed lip breathing, pacing and energy conservation were practiced throughout exercise.  Patient received information on each machine that was used including safety. The education topic today was Intro to Pulmonary Rehab. The pulmonary rehab program was explained including guidelines, purpose and expectations. Pulmonary knowledge test was given. This session was completed in a group of two or more individuals.               Treating Clinicians: [x] Miroslava Heath RRT MD in Office: []BOLIVAR Maravilla  []VALENTINA García  []PAIGE Olmstead  []JR Conway    []ALVERTO Cook []DEIRDRE Stone  []VALENTINA Fong  []VALENTINA Chew  []LILIAN Lara []TATYANA Diaz   []ALVERTO Galvan  []BOLIVAR Prado []PAIGE Nice []PAIGE Santo []SHAWN Betts []TATYANA Baires []ALVERTO Valenzuela

## 2025-07-09 ENCOUNTER — CLINICAL SUPPORT (OUTPATIENT)
Dept: PULMONOLOGY | Facility: CLINIC | Age: OVER 89
End: 2025-07-09
Payer: MEDICARE

## 2025-07-11 ENCOUNTER — CLINICAL SUPPORT (OUTPATIENT)
Dept: PULMONOLOGY | Facility: CLINIC | Age: OVER 89
End: 2025-07-11
Payer: MEDICARE

## 2025-07-11 DIAGNOSIS — J84.9 INTERSTITIAL LUNG DISEASE: Primary | ICD-10-CM

## 2025-07-11 PROCEDURE — G0239 OTH RESP PROC, GROUP: HCPCS | Performed by: INTERNAL MEDICINE

## 2025-07-11 NOTE — PROGRESS NOTES
[facility]  Pulmonary Rehabilitation  Daily Treatment Log    Name: Beth Fregoso : 1935 Date: 2025     Session: 3/ approved: 18     Time In/Out::30   Non-COPD Charges: [id number]    Physician :Dr. Conway    Dx:J84.9    Non-COPD:     Primary Insurance: Medicare     Secondary Insurance: [facility]     /78 / HR 68 / RR 18 / SpO2% 93    Auscultation:  []Clear   [x]Clear and Decreased  []Insp. Wheeze  []Exp. Wheeze     []Rhonchi   []Rales    Target Heart Rate Zone:   Max HR: 73    Exercise Sp02% Blood Pressure Heart Rate QUINN Scale/Fatigue   Treadmill: Speed:       Min:   % Grade:        Distance:            Bands:#:        Reps:    Min:   Color Band:            6MWT:Min:  Distance:   MET:            Step-ups:#:   Reps:     Min:             Cybex/Vision/Schwinn Bike:  Level:  Speed:  RPM:   Min:   Distance:        Seat:             Hand Weights: Pounds: Min: Curls#         Sets:   Presses#    Sets:   Fly#            Sets:   Shoulder Shrugs#     Sets:   Wings#       Sets:   Wrist Curls#   Sets:                               NuStep: Level: 1 METS1.6:  Min:15       SPM:67   Total Steps:899   Seat:6      Arms: 10  93  120/76  73  0   Arm Ergometer: Level:  Min:   RPM:       Fwd:      Back:   Total Turns:            PLB / DB : Min: 10  P-Flex: Level:         Min:   IS: Volume:             Min:   Warm-up Stretches: Min:10               Education and Training: [x]QUINN Scale  [x]PLB  [x]DB  []IMT  []IS  [x]Vital Signs   [x]Target HR Zone  [x]Exercise Vital Signs [x] Safe Vital Signs  []Pulmonary Anatomy  [x]Lung Function  []Lung Disease Process  []Oxygen Use  []Oxygen Safety  []Pulmonary Meds  []Med Instruction  []Cough Technique  []Pulmonary Hygeine  []Infection Prevention  []Signs of Infection  []When to Call MD  []Nutrition  []Weight Management  []Advance Directives  []Posture  []Body Mechanics [x]Energy Conservation  [x]Pacing ADL's  []Stress/Anxiety Management  [x]Stretching   "[x]Home Exercise  []Smoking Cessation   [x]VT Progress  []Maintenance Exercise Program    []Pulmonary Knowledge Test/Education  []Review PFT  []Review Specific Disease  []Review Home Follow-through Compliance  [x]Safe Exercise [x]Exercise Progression Strategies    Progress Report:  Patient was here for continuation of pulmonary rehabilitation maintenance. She was able to maintain an SpO2% greater than 90 throughout exercise. Pursed lip breathing, pacing, and energy conservation were practiced and encouraged throughout exercise.The education topic for the day was \"What Do the Lungs Do?\" and \"Coping with Shortness of Breath.\" Pulmonary anatomy, the role of alveoli, and gas exchange were discussed. Breathing techniques and relaxation techniques were discussed for breaking the dyspnea cycle. This session was completed in a group of two or more individuals.         Treating Clinicians: [x] Miroslava Heath RRT MD in Office: []BOLIVAR Maravilla  []VALENTINA García  []PAIGE Olmstead  []JR Conway    []ALVERTO Cook []DEIRDRE Stone  []VALENTINA Fong  []VALENTINA Chew  []LILIAN Lara []TATYANA Diaz   []ALVERTO Galvan  []BOLIVAR Prado []PAIGE Nice []PAIGE Santo []SHAWN Betts []TATYANA Baires []ALVERTO Valenzuela  "

## 2025-07-14 ENCOUNTER — CLINICAL SUPPORT (OUTPATIENT)
Dept: PULMONOLOGY | Facility: CLINIC | Age: OVER 89
End: 2025-07-14
Payer: MEDICARE

## 2025-07-14 DIAGNOSIS — J84.9 INTERSTITIAL LUNG DISEASE: Primary | ICD-10-CM

## 2025-07-14 PROCEDURE — G0239 OTH RESP PROC, GROUP: HCPCS | Performed by: INTERNAL MEDICINE

## 2025-07-15 NOTE — PROGRESS NOTES
[facility]  Pulmonary Rehabilitation  Daily Treatment Log    Name: Beth Fregoso : 1935 Date: 2025     Session:4 / approved: 18     Time In/Out:2 /3:30    Non-COPD Charges: [id number]    Physician :Dr. Conway    Dx:J84.9    Non-COPD:     Primary Insurance: Medicare     Secondary Insurance: [place]    /60 / HR 71 / RR 18 / SpO2% 92    Auscultation:  []Clear   [x]Clear and Decreased  []Insp. Wheeze  []Exp. Wheeze     []Rhonchi   []Rales    Target Heart Rate Zone:   Max HR: 71    Exercise Sp02% Blood Pressure Heart Rate QUINN Scale/Fatigue   Treadmill: Speed:       Min:   % Grade:        Distance:            Bands:#:        Reps:    Min:   Color Band:            6MWT:Min:  Distance:   MET:            Step-ups:#:   Reps:     Min:             Cybex/Vision/Schwinn Bike:  Level:  Speed:  RPM:   Min:   Distance:        Seat:             Hand Weights: Pounds:   Min:   Curls#         Sets:   Presses#    Sets:   Fly#            Sets:   Shoulder Shrugs#     Sets:   Wings#       Sets:   Wrist Curls#   Sets:                               NuStep: Level:1  METS:1.5  Min:20       SPM:70   Total Steps:1139   Seat: 7     Arms:9   92  120/64  69  0   Arm Ergometer: Level:  Min:   RPM:       Fwd:      Back:   Total Turns:            PLB / DB : Min: 10  P-Flex: Level:         Min:   IS: Volume:             Min:   Warm-up Stretches: Min: 10              Education and Training: [x]QUINN Scale  [x]PLB  [x]DB  []IMT  []IS  [x]Vital Signs   [x]Target HR Zone  [x]Exercise Vital Signs [x] Safe Vital Signs  []Pulmonary Anatomy  []Lung Function  [x]Lung Disease Process  []Oxygen Use  []Oxygen Safety  []Pulmonary Meds  []Med Instruction  []Cough Technique  []Pulmonary Hygeine  []Infection Prevention  []Signs of Infection  []When to Call MD  []Nutrition  []Weight Management  []Advance Directives  []Posture  []Body Mechanics [x]Energy Conservation  [x]Pacing ADL's  []Stress/Anxiety Management  [x]Stretching   [x]Home Exercise  []Smoking Cessation   []KS Progress  []Maintenance Exercise Program    []Pulmonary Knowledge Test/Education  []Review PFT  []Review Specific Disease  [x]Review Home Follow-through Compliance  [x]Safe Exercise [x]Exercise Progression Strategies    Progress Report:  Patient was here for continuation of pulmonary rehabilitation. Pursed lip breathing, pacing, and energy conservation were practiced throughout exercise.The education topic for today was COPD and Restrictive Lung Disease. A definition for each disease process was given and explained. The different COPD conditions were explained as well as the different restrictive lung disease conditions. We discussed the the different tests used to diagnose each disease process. Treatments for COPD and Restrictive Lung Disease were explained. The types of medications used for these diseases were discussed and the importance of taking them as directed was emphasized. Slowing the progression and management of these diseases was also covered. Ways to prevent infection was included.                   Treating Clinicians: [x] Miroslava Heath RRT MD in Office: []BOLIVAR Maravilla  []VALENTINA García  []PAIGE Olmstead  []JR Conway    []ALVERTO Cook []DEIRDRE Stone  []VALENTINA Fong  [x]VALENTINA Chew  []LILIAN Lara []TATYANA Diaz   []ALVERTO Galvan  [x]BOLIVAR Prado []PAIGE Nice []PAIGE Santo []SHAWN Betts []TATYANA Baires []ALVERTO Valenzuela

## 2025-07-16 ENCOUNTER — CLINICAL SUPPORT (OUTPATIENT)
Dept: PULMONOLOGY | Facility: CLINIC | Age: OVER 89
End: 2025-07-16
Payer: MEDICARE

## 2025-07-16 ENCOUNTER — HOSPITAL ENCOUNTER (OUTPATIENT)
Dept: CT IMAGING | Facility: HOSPITAL | Age: OVER 89
Discharge: HOME | End: 2025-07-16
Admitting: INTERNAL MEDICINE
Payer: MEDICARE

## 2025-07-16 DIAGNOSIS — R91.8 GROUND GLASS OPACITY PRESENT ON IMAGING OF LUNG: ICD-10-CM

## 2025-07-16 DIAGNOSIS — J84.9 INTERSTITIAL LUNG DISEASE: Primary | ICD-10-CM

## 2025-07-16 PROCEDURE — G0239 OTH RESP PROC, GROUP: HCPCS | Performed by: INTERNAL MEDICINE

## 2025-07-16 PROCEDURE — 71250 CT THORAX DX C-: CPT

## 2025-07-17 ENCOUNTER — TELEPHONE (OUTPATIENT)
Dept: PULMONOLOGY | Facility: CLINIC | Age: OVER 89
End: 2025-07-17
Payer: MEDICARE

## 2025-07-17 RX ORDER — AMLODIPINE BESYLATE 5 MG/1
5 TABLET ORAL
Qty: 30 TABLET | Refills: 1 | OUTPATIENT
Start: 2025-07-17

## 2025-07-17 NOTE — PROGRESS NOTES
[facility]  Pulmonary Rehabilitation  Daily Treatment Log    Name: Beth Fregoso : 1935 Date: 2025     Session:5 / approved: 18          Time In/Out:2  3:30      Physician :Dr. Conway       Dx:J84.9           Primary Insurance: Medicare Secondary Insurance:[place]    /76/ HR 18 / RR 18 / SpO2% 93     Auscultation:  []Clear   [x]Clear and Decreased   []Insp. Wheeze   []Exp. Wheeze     []Rhonchi   []Rales    Target Heart Rate Zone:   Max HR: 91    Exercise Sp02% Blood Pressure Heart Rate QUINN Scale/Fatigue   Treadmill: Speed:    Min:   % Grade:        Distance:            Bands:#:    Reps:      Min:   Color Band:             6MWT:Min:6   Distance: 600 MET: 1.9   85  120/80  91  3   Step-ups:#:    Reps:     Min:            Cybex/Vision/Schwinn Bike:  Level:   Speed:  RPM:   Min:   Distance:           Seat:             Hand Weights: Pounds:   Min:   Curls#      Sets:   Presses#    Sets:   Fly#            Sets:   Shoulder Shrugs#     Sets:   Wings#      Sets:   Wrist Curls#   Sets:                            NuStep: Level:   METS:  Min:     SPM:     Total Steps:   Seat:      Arms:            Arm Ergometer: Level:     Min:   RPM:       Fwd:     Back:   Total Turns:             PLB / DB : Min: 10  P-Flex: Level:        Min:   IS: Volume:             Min:   Warm-up Stretches: Min: 10               Education and Training: [x]QUINN Scale  [x]PLB  [x]DB  []IMT  []IS  [x]Vital Signs   [x]Target HR Zone  [x]Exercise Vital Signs [x] Safe Vital Signs  []Pulmonary Anatomy  []Lung Function  []Lung Disease Process  []Oxygen Use  []Oxygen Safety  []Pulmonary Meds  []Med Instruction  []Cough Technique  []Pulmonary Hygeine  []Infection Prevention  []Signs of Infection  []When to Call MD  []Nutrition  []Weight Management  []Advance Directives  []Posture  []Body Mechanics [x]Energy Conservation  [x]Pacing ADL's  []Stress/Anxiety Management  [x]Stretching  [x]Home Exercise  []Smoking Cessation    [x]AR Progress  []Maintenance Exercise Program    []Pulmonary Knowledge Test/Education  []Review PFT  []Review Specific Disease  []Review Home Follow-through Compliance  [x]Safe Exercise [x]Exercise Progression Strategies    Progress Report:Patient was here today for continuation of pulmonary rehabilitation. She reported that she has been more short of breath and that her oxygen levels have been dropping when she is walking. 6MWT was performed and she required 3L to maintain an O2 level greater than 88% while walking. Pursed lip breathing, pacing, and energy conservation were practiced throughout exercise.The education topic for today was Activities of Daily Living. [facility] was watched. Breathing techniques, pacing, and energy conservation were demonstrated. The villarreal cough and airway clearance were also demonstrated. Ways to manage every day activities was discussed and focusing on planning the day was encouraged. Planning a trip with a lung disease and oxygen needs were covered.             Treating Clinicians: [x] Miroslava Heath RRT MD in Office: []BOLIVAR Maravilla  []VALENTINA García  []PAIGE Olmstead  []JR Conway    []ALVERTO Cook []DEIRDRE Stone  [x]VALENTINA Chew  []LILIAN Lara   []ALVERTO Galvan  [x]BOLIVAR Prado []PAIGE Nice []TATYANA Diaz []PAIGE Santo []SHAWN Betts []TATYANA Baires []ALVERTO Valenzuela

## 2025-07-17 NOTE — TELEPHONE ENCOUNTER
Patient called complaining of shortness of breath, wheezing, coughing, and swelling of legs since yesterday. Patient denies all other symptoms. Her O2 goes down to 88% while walking. She is using all medications, in rehab, and taking Mucinex DM. She prefers to see Dr. Conway and her apt is on 07/23. Notified her to contact her cardiologist as she feels her symptoms are due to her water pill, and go to ED or [facility] if symptoms worsen. Notified her to contact office if she has other concerns or problems.

## 2025-07-18 ENCOUNTER — TELEPHONE (OUTPATIENT)
Dept: CARDIOLOGY | Facility: CLINIC | Age: OVER 89
End: 2025-07-18
Payer: MEDICARE

## 2025-07-18 ENCOUNTER — TELEPHONE (OUTPATIENT)
Dept: INTERNAL MEDICINE | Facility: CLINIC | Age: OVER 89
End: 2025-07-18
Payer: MEDICARE

## 2025-07-18 DIAGNOSIS — R06.09 DOE (DYSPNEA ON EXERTION): Primary | ICD-10-CM

## 2025-07-18 DIAGNOSIS — I38 HEART VALVE DISORDER: ICD-10-CM

## 2025-07-18 RX ORDER — FUROSEMIDE 20 MG/1
20 TABLET ORAL 2 TIMES DAILY
Qty: 180 TABLET | Refills: 1 | Status: SHIPPED | OUTPATIENT
Start: 2025-07-18

## 2025-07-18 RX ORDER — AMLODIPINE BESYLATE 5 MG/1
5 TABLET ORAL
Qty: 30 TABLET | Refills: 1 | Status: SHIPPED | OUTPATIENT
Start: 2025-07-18

## 2025-07-18 NOTE — TELEPHONE ENCOUNTER
Spoke with patient regarding farxiga, pt said she does have problems with genital irritation and UTIs.

## 2025-07-18 NOTE — TELEPHONE ENCOUNTER
"    Caller: Beth Fregoso W \"Michelle\"     Relationship: SELF    Best call back number: [phone]    What is your medical concern? FOR THE LAST THREE DAYS THE PT HAS HAS DEVELOPED SWELLING IN HER FEET AND LEGS. SHE HAS ALSO DEVELOPED SHORTNESS OF BREATH. SHE WAS NOT TAKING HER FUROSEMIDE BECAUSE SHE THOUGHT SHE WAS ONLY SUPPOSE TO TAKE IT AS NEEDED. SHE DID START TAKING HER MEDICATION YESTERDAY AND SHE INCREASED HER DOSE TO 3 PILLS. SHE HAS HAD 3 OF THEM TODAY AS WELL. SHE SAID THE SWELLING WAS STARTING TO SUBSIDE A LITTLE BUT SHE IS CONFUSED ON HOW SHE SHOULD TAKE HER MEDICATION FROM NOW ON. PLEASE REACH OUT TO THE PT TO ADDRESS HER CONCERNS AND GO OVER HER MEDICATION INSTRUCTIONS.       HUB TRANSFERRED PT TO OFFICE FOR ACTIVE SYMPTOMS.       "

## 2025-07-18 NOTE — TELEPHONE ENCOUNTER
"  Caller: Beth Fregoso \"Michelle\"    Relationship: Self    Best call back number: [phone]     What was the call regarding: PATIENT WOULD LIKE TO SPEAK TO SOMEONE REGARDING THE REFILL FOR AMLODIPINE THAT WAS DENIED. SHE CHANGED PROVIDERS TO DR MCGOVERN AND NOW SHE WON'T FILL THE MEDICATION STATING IT'S FROM ANOTHER PROVIDER.    Is it okay if the provider responds through MyChart: NO  "

## 2025-07-18 NOTE — TELEPHONE ENCOUNTER
Spoke with patient regarding lasix. She stated she didn't know that she was suppose to be taking this mediation daily as discussed at her appointment on 5/22/2025. Patient started having swelling and shortness of breath so took 60 mg yesterday and 60 mg today. Instructed patient she is only suppose to be taking lasix 20 mg BID per prescription. She stated she feels a lot better regarding her shortness of breath but still has some swelling. Instructed patient to continue with lasix 20 mg BID and get lab work done on Monday when she comes for pulmonary rehab. She also needs refill of her lasix, how would you like to refill it?    Please advise,

## 2025-07-18 NOTE — TELEPHONE ENCOUNTER
amLODIPine (NORVASC) 5 MG tablet [Pharmacy Med Name: AMLODIPINE 5 MG TAB 5 Tablet] 30 tablet 1 7/17/2025   Sig - Route: TAKE 1 TABLET BY MOUTH DAILY. - Oral  Class: Normal  ARAM: No  Reason for Refusal: Prescriber not at this practice  Refused By: Marcella Dai [facility] - [place], [place] - [address] - [phone]  - [phone] [facility] [address] [facility] [id number]        Returned call to patient who is requesting prescription be changed to Dr Watkins name

## 2025-07-18 NOTE — TELEPHONE ENCOUNTER
Spoke with patient and let her know med was sent in. Patient verbalized understanding and appreciation

## 2025-07-21 ENCOUNTER — TELEPHONE (OUTPATIENT)
Dept: CARDIOLOGY | Facility: CLINIC | Age: OVER 89
End: 2025-07-21
Payer: MEDICARE

## 2025-07-21 ENCOUNTER — OFFICE VISIT (OUTPATIENT)
Dept: CARDIOLOGY | Facility: HOSPITAL | Age: OVER 89
End: 2025-07-21
Payer: MEDICARE

## 2025-07-21 VITALS
BODY MASS INDEX: 32.62 KG/M2 | SYSTOLIC BLOOD PRESSURE: 144 MMHG | RESPIRATION RATE: 18 BRPM | WEIGHT: 166.13 LBS | HEIGHT: 60 IN | DIASTOLIC BLOOD PRESSURE: 65 MMHG | HEART RATE: 69 BPM | OXYGEN SATURATION: 95 %

## 2025-07-21 DIAGNOSIS — I50.33 ACUTE ON CHRONIC HEART FAILURE WITH PRESERVED EJECTION FRACTION (HFPEF): Primary | ICD-10-CM

## 2025-07-21 DIAGNOSIS — I35.1 NONRHEUMATIC AORTIC VALVE INSUFFICIENCY: ICD-10-CM

## 2025-07-21 DIAGNOSIS — E78.2 MIXED HYPERLIPIDEMIA: ICD-10-CM

## 2025-07-21 LAB
ABSOLUTE LUNG FLUID CONTENT: 35 % (ref 20–35)
ANION GAP SERPL CALCULATED.3IONS-SCNC: 10 MMOL/L (ref 5–15)
BUN SERPL-MCNC: 17 MG/DL (ref 8–23)
BUN/CREAT SERPL: 32.7 (ref 7–25)
CALCIUM SPEC-SCNC: 8.7 MG/DL (ref 8.6–10.5)
CHLORIDE SERPL-SCNC: 101 MMOL/L (ref 98–107)
CO2 SERPL-SCNC: 26 MMOL/L (ref 22–29)
CREAT SERPL-MCNC: 0.52 MG/DL (ref 0.57–1)
EGFRCR SERPLBLD CKD-EPI 2021: 88.9 ML/MIN/1.73
GLUCOSE SERPL-MCNC: 98 MG/DL (ref 65–99)
NT-PROBNP SERPL-MCNC: 4713 PG/ML (ref 0–1800)
POTASSIUM SERPL-SCNC: 4.4 MMOL/L (ref 3.5–5.2)
SODIUM SERPL-SCNC: 137 MMOL/L (ref 136–145)

## 2025-07-21 PROCEDURE — 83880 ASSAY OF NATRIURETIC PEPTIDE: CPT | Performed by: NURSE PRACTITIONER

## 2025-07-21 PROCEDURE — 80048 BASIC METABOLIC PNL TOTAL CA: CPT | Performed by: NURSE PRACTITIONER

## 2025-07-21 RX ORDER — SPIRONOLACTONE 25 MG/1
25 TABLET ORAL DAILY
Qty: 30 TABLET | Refills: 11 | Status: SHIPPED | OUTPATIENT
Start: 2025-07-21

## 2025-07-21 NOTE — PATIENT INSTRUCTIONS
Begin aldactone 25 mg once a day   Continue lasix 20 mg twice a day   Lesa will call you tomorrow

## 2025-07-21 NOTE — TELEPHONE ENCOUNTER
Patient called and said she is still swelling and has shortness of breath despite lasix. Reached out to Melissa WASHINGTON and they are going to see her in clinic today at 12. Patient verbalizes understanding.

## 2025-07-21 NOTE — PROGRESS NOTES
Heart Failure [facility]    Date:  07/21/25     Vitals:    07/21/25 1156   BP: 144/65   Pulse: 69   Resp: 18   SpO2: 95%        Indication:  Heart Failure     Procedure:  ReDS device sensor unit applied to right side of chest and right side of back.  Appropriate positioning confirmed based off of the unit's calculation.  Chest measurement obtained with the chest size ruler.  Measurement session performed over 45 seconds.      Method of arrival:  Ambulatory with walker    Weighing self daily:  Yes    Taking medications as prescribed:  Yes    Edema:  2+    Shortness of Air:  Yes    Number of pillows used at night:  <2    Results:  ReDS Value=      35                    Interpretation:  25-35% - normal/ideal lung fluid content    Melissa Velasco, FELIX 07/21/25 12:27 EDT

## 2025-07-21 NOTE — PROGRESS NOTES
"Chief Complaint  Establish Care and Edema    Subjective    History of Present Illness {CC  Problem List  Visit  Diagnosis   Encounters  Notes  Medications  Labs  Result Review Imaging  Media :23}       History of Present Illness   89-year-old female presents to the office today as a same-day appointment for ongoing evaluation of her acute on chronic HFpEF.  She reports that she has been experiencing problems with fluid over the last few weeks.  Has taken increased doses of Lasix with very little improvement in symptoms.  She does report dyspnea on exertion as well as abdominal fullness and fatigue which are new symptoms for her.  she has a history of hypertension, hyperlipidemia, valvular heart disease, disequilibrium syndrome, bilateral cataracts, GERD, personal history of breast cancer, anxiety, essential tremor, spinal stenosis, asthma, interstitial lung disease. Currently denies cp, dizziness, palpitations, presyncope and syncope.   Objective     Vital Signs:   Vitals:    07/21/25 1153 07/21/25 1156   BP: 159/67 144/65   BP Location: Right arm Right arm   Patient Position: Standing Sitting   Cuff Size: Adult Adult   Pulse: 70 69   Resp:  18   SpO2: 95% 95%   Weight:  75.4 kg (166 lb 2 oz)   Height:  152 cm (59.84\")     Body mass index is 32.62 kg/m².  Physical Exam  Vitals and nursing note reviewed.   Constitutional:       Appearance: Normal appearance.   HENT:      Head: Normocephalic.   Eyes:      Pupils: Pupils are equal, round, and reactive to light.   Cardiovascular:      Rate and Rhythm: Normal rate and regular rhythm.      Pulses: Normal pulses.      Heart sounds: Normal heart sounds. No murmur heard.  Pulmonary:      Effort: Pulmonary effort is normal.      Breath sounds: Rales present.   Abdominal:      General: Bowel sounds are normal. There is distension.   Musculoskeletal:         General: Normal range of motion.      Cervical back: Normal range of motion.      Right lower leg: Edema (2+ " pititng ankle to knee) present.      Left lower leg: Edema (2+ pitting ankle to knee) present.   Skin:     General: Skin is warm and dry.      Capillary Refill: Capillary refill takes less than 2 seconds.   Neurological:      Mental Status: She is alert and oriented to person, place, and time.   Psychiatric:         Mood and Affect: Mood normal.         Thought Content: Thought content normal.              Result Review  Data Reviewed:{ Labs  Result Review  Imaging  Med Tab  Media :23}     Labs pending from today  Comprehensive Metabolic Panel (06/13/2025 14:13)  proBNP (06/13/2025 14:13)    Adult Transthoracic Echo Complete W/ Cont if Necessary Per Protocol (09/07/2024 10:10)     I have reviewed this documentation, made edits where appropriate, and agree with the final report of ReDS data/interpretation. In addition, I have the following to add:    Lung fluid content by ReDS assessment correlates to pulmonary capillary wedge pressure (Pascual et al. OUSMANE 2018). In patients with LEFT-sided heart failure, elevated readings suggest that the patient MAY benefit from additional diuresis while low readings suggest that the patient MAY benefit from a reduction in diuretics; clinical correlation is recommended. Low normal and mildly elevated readings may be appropriate for some patients. In patients with RIGHT-sided heart failure, lung fluid content may be a less reliable marker for guiding diuresis.           Assessment and Plan {CC Problem List  Visit Diagnosis  ROS  Review (Popup)  [employer] Maintenance  Quality  BestPractice  Medications  SmartSets  SnapShot Encounters  Media :23}   1. Acute on chronic heart failure with preserved ejection fraction (HFpEF)  Nyha III  - ReDs Vest 35  - Basic Metabolic Panel; Future  - proBNP; Future  - Basic Metabolic Panel  - proBNP  Patient received 80 mg of IV Lasix in office today.  Vitals were monitored and stable.  Patient did void x 2 prior to discharge with the office  with greater than 500 mL in the first void.  Begin Aldactone 25 mg daily and continue Lasix 20 mg twice daily until follow-up visit next week  2. Nonrheumatic aortic valve insufficiency  Stable     3. Mixed hyperlipidemia  Diet controlled       Follow Up {Instructions Charge Capture  Follow-up Communications :23}   Return in about 1 week (around 7/28/2025) for Office visit, HF.    Patient was given instructions and counseling regarding her condition or for health maintenance advice. Please see specific information pulled into the AVS if appropriate.  Patient was instructed to call the [facility] with any questions, concerns, or worsening symptoms.

## 2025-07-22 ENCOUNTER — TELEPHONE (OUTPATIENT)
Dept: CARDIOLOGY | Facility: HOSPITAL | Age: OVER 89
End: 2025-07-22
Payer: MEDICARE

## 2025-07-22 NOTE — TELEPHONE ENCOUNTER
RN called to follow up on IV diuretics given in office yesterday     Pt reports she is down 3.5 pounds and feeling better  RN re-enforced when to call us for symptom management and pt verbalized understanding

## 2025-07-23 ENCOUNTER — CLINICAL SUPPORT (OUTPATIENT)
Dept: PULMONOLOGY | Facility: CLINIC | Age: OVER 89
End: 2025-07-23
Payer: MEDICARE

## 2025-07-23 ENCOUNTER — OFFICE VISIT (OUTPATIENT)
Dept: PULMONOLOGY | Facility: CLINIC | Age: OVER 89
End: 2025-07-23
Payer: MEDICARE

## 2025-07-23 VITALS
BODY MASS INDEX: 32.27 KG/M2 | OXYGEN SATURATION: 95 % | TEMPERATURE: 98 F | HEIGHT: 60 IN | HEART RATE: 74 BPM | WEIGHT: 164.38 LBS | DIASTOLIC BLOOD PRESSURE: 68 MMHG | SYSTOLIC BLOOD PRESSURE: 144 MMHG | RESPIRATION RATE: 16 BRPM

## 2025-07-23 DIAGNOSIS — R06.09 DOE (DYSPNEA ON EXERTION): ICD-10-CM

## 2025-07-23 DIAGNOSIS — R91.8 GROUND GLASS OPACITY PRESENT ON IMAGING OF LUNG: ICD-10-CM

## 2025-07-23 DIAGNOSIS — I38 HEART VALVE DISORDER: ICD-10-CM

## 2025-07-23 DIAGNOSIS — I50.32 CHRONIC DIASTOLIC CHF (CONGESTIVE HEART FAILURE): ICD-10-CM

## 2025-07-23 DIAGNOSIS — J45.909 IDIOPATHIC ASTHMA: Primary | ICD-10-CM

## 2025-07-23 NOTE — PROGRESS NOTES
PULMONARY  NOTE    Chief Complaint     Groundglass nodule, history of asthma, valvular heart disease, chronic congestive heart failure    History of Present Illness     89-year-old female returns today for follow-up she was originally referred for abnormal chest imaging  She has a groundglass opacity with plans for serial chest imaging    She has a history of valvular heart disease with chronic lower extremity edema    Since I last saw her she has noted increasing lower extremity edema and increasing shortness of breath  She has been trying to drink extra fluid and drink 64 ounces of water yesterday    She is followed in the [facility] heart failure clinic  She was seen there Monday and given an IV dose of furosemide and spironolactone was added to her medical regimen  She is chronically on furosemide 20 mg which she takes twice a day    Patient Active Problem List   Diagnosis    Age-related nuclear cataract of both eyes    Combined forms of age-related cataract of both eyes    Anxiety disorder    Blurry vision, bilateral    Disequilibrium syndrome    Encephalopathy, hypertensive    Essential hypertension    Hypertensive disorder    Essential tremor    Gastroesophageal reflux disease without esophagitis    Hyperlipidemia LDL goal <100    Intermediate stage nonexudative age-related macular degeneration of both eyes    Irregular astigmatism of both eyes    Left lower quadrant pain    Malignant tumor of breast    Meibomian gland dysfunction    Mild cognitive disorder    Orthostatic hypotension    Persistent cough    Sialoadenitis of submandibular gland    Spinal stenosis of lumbar region    Suprapubic pain    Heart valve disorder (AS/AR/MR)    Asthma    Interstitial lung disease (Reticulation; no UIP)    Normal gynecologic examination    Personal history of malignant neoplasm of breast    SHEIKH (dyspnea on exertion) - multifactorial    Ground glass nodules    Chronic diastolic CHF (congestive heart failure)      Allergies    Allergen Reactions    Amoxicillin Rash    Penicillins Rash       Current Outpatient Medications:     albuterol (PROVENTIL) (2.5 MG/3ML) 0.083% nebulizer solution, INHALE 3 ML EVERY 4-6 HOURS BY NEBULIZATION ROUTE., Disp: 75 mL, Rfl: 2    albuterol sulfate  (90 Base) MCG/ACT inhaler, Inhale 2 puffs Every 4 (Four) Hours As Needed for Shortness of Air., Disp: , Rfl:     amLODIPine (NORVASC) 5 MG tablet, Take 1 tablet by mouth Daily., Disp: 30 tablet, Rfl: 1    Breo Ellipta 200-25 MCG/ACT inhaler, INHALE 1 PUFF BY MOUTH ONCE DAILY, Disp: 60 each, Rfl: 1    doxazosin (CARDURA) 4 MG tablet, TAKE 1 TABLET EVERY DAY BY MOUTH, Disp: 90 tablet, Rfl: 7    furosemide (LASIX) 20 MG tablet, Take 1 tablet by mouth 2 (Two) Times a Day., Disp: 180 tablet, Rfl: 1    ipratropium-albuterol (DUO-NEB) 0.5-2.5 mg/3 ml nebulizer, Take 3 mL by nebulization Every 4 (Four) Hours As Needed for Shortness of Air., Disp: 360 mL, Rfl: 1    propranolol (INDERAL) 40 MG tablet, Take 1 tablet by mouth 2 (Two) Times a Day., Disp: , Rfl:     spironolactone (ALDACTONE) 25 MG tablet, Take 1 tablet by mouth Daily., Disp: 30 tablet, Rfl: 11  MEDICATION LIST AND ALLERGIES REVIEWED.    Family History   Problem Relation Age of Onset    Heart attack Father          in his 60s    Breast cancer Neg Hx     Ovarian cancer Neg Hx      Social History     Tobacco Use    Smoking status: Former     Current packs/day: 0.00     Average packs/day: 1 pack/day for 10.0 years (10.0 ttl pk-yrs)     Types: Cigarettes     Start date:      Quit date:      Years since quittin.5     Passive exposure: Past    Smokeless tobacco: Never   Vaping Use    Vaping status: Never Used   Substance Use Topics    Alcohol use: Yes     Comment: occasionally    Drug use: No     Social History     Social History Narrative    Minimal/remote smoking history     FAMILY AND SOCIAL HISTORY REVIEWED.    Review of Systems  IF PRESENT REFER TO SCANNED ROS SHEET FROM SAME  "DATE  OTHERWISE ROS OBTAINED AND NON-CONTRIBUTORY OVER HPI.    /68 (BP Location: Left arm, Patient Position: Sitting, Cuff Size: Adult)   Pulse 74   Temp 98 °F (36.7 °C)   Resp 16   Ht 152 cm (59.84\")   Wt 74.6 kg (164 lb 6 oz)   SpO2 95% Comment: room air at resting  BMI 32.27 kg/m²   Physical Exam  Vitals and nursing note reviewed.   Constitutional:       General: She is not in acute distress.     Appearance: She is well-developed. She is not diaphoretic.   HENT:      Head: Normocephalic and atraumatic.   Neck:      Thyroid: No thyromegaly.   Cardiovascular:      Rate and Rhythm: Normal rate and regular rhythm.      Heart sounds: Normal heart sounds. No murmur heard.  Pulmonary:      Effort: Pulmonary effort is normal.      Breath sounds: Normal breath sounds. No stridor.   Abdominal:      General: Bowel sounds are normal.   Musculoskeletal:      Comments: Pitting bilateral lower extremity edema up to her knees   Lymphadenopathy:      Cervical: No cervical adenopathy.      Upper Body:      Right upper body: No supraclavicular or epitrochlear adenopathy.      Left upper body: No supraclavicular or epitrochlear adenopathy.   Skin:     General: Skin is warm and dry.   Neurological:      Mental Status: She is alert.   Psychiatric:         Behavior: Behavior normal.         Results     CT scan of the chest reviewed on PACS from 7/16/2025 stable or improved right apical groundglass nodule but no new changes of congestive heart failure with bilateral pleural effusions and parenchymal edema    Immunization History   Administered Date(s) Administered    Arexvy (RSV, Adults 60+ yrs) 02/26/2025    COVID-19 (PFIZER) BIVALENT 12+YRS 09/16/2022, 05/20/2023    COVID-19 (PFIZER) Purple Cap Monovalent 01/28/2021, 02/18/2021, 03/08/2021, 10/02/2021, 10/04/2021    COVID-19 (UNSPECIFIED) 11/22/2024    Covid-19 (Pfizer) Gray Cap Monovalent 04/16/2022    Fluzone High-Dose 65+YRS 09/26/2024    Fluzone High-Dose 65+yrs " 10/28/2022, 10/14/2023    Influenza, Unspecified 10/01/2022    Pneumococcal Conjugate 13-Valent (PCV13) 08/01/2016    Pneumococcal Conjugate 20-Valent (PCV20) 04/03/2023    Pneumococcal Polysaccharide (PPSV23) 10/01/2014    Shingrix 04/27/2023, 09/14/2023    TD Preservative Free (Tenivac) 06/07/2022     Problem List       ICD-10-CM ICD-9-CM   1. Asthma  J45.909 493.90   2. Heart valve disorder (AS/AR/MR)  I38 424.90   3. Ground glass nodules  R91.8 793.19   4. SHEIKH (dyspnea on exertion) - multifactorial  R06.09 786.09   5. Chronic diastolic CHF (congestive heart failure)  I50.32 428.32     428.0       Discussion     We reviewed her chest imaging together on PACS  The right upper lobe groundglass nodule is at least the same and possibly a little bit smaller  However, she has new/worse radiographic changes of congestive heart failure  This goes along with her clinical picture of increasing weight and lower extremity edema    She did lose a little bit of weight since she was in the heart failure clinic on Monday where she got an IV dose of furosemide  Also spironolactone was added  However her weight has stabilized    We discussed salt and water moderation  She has apparently been forcing herself to drink large volumes of fluid  She is taking furosemide twice a day but I think she would be better served by taking both furosemide tablets at the same time  So I have recommended that she continue to take the spironolactone and take both of her Lasix every morning  Of asked her to weigh herself on a daily basis  If her weight continues to go up she needs to contact us or the heart failure clinic  If her weight slowly declines back to her baseline then continue the current medical regimen    She meets criteria for oxygen supplementation  I have asked her to, at the very least, use supplemental oxygen every night when she sleeps  This is being arranged through [employer] company    Would probably suggest a 6-month follow-up CT scan  of the chest for her groundglass nodule but we will decide that when she comes back for follow-up in 1-2 months    This visit represents an established relationship with whom the provider is providing ongoing longitudinal care related to serious and/or complex conditions    Level of service justified based on 42 minutes spent in patient care on this date of service including, but not limited to: preparing to see the patient, obtaining and/or reviewing history, performing medically appropriate examination, ordering tests/medicine/procedures, independently interpreting results, documenting clinical information in EHR, and counseling/education of patient/family/caregiver (excluding time spent on other separate services such as performing procedures or test interpretation, if applicable). (Level 4 30-39 minutes; Level 5 40-54 minutes)    Watson Conway MD  Note electronically signed    CC: Nakia Watkins, DO

## 2025-07-24 ENCOUNTER — TELEPHONE (OUTPATIENT)
Dept: CARDIOLOGY | Facility: HOSPITAL | Age: OVER 89
End: 2025-07-24
Payer: MEDICARE

## 2025-07-24 NOTE — TELEPHONE ENCOUNTER
Pt called to report that she saw Pulmonary yesterday and he started her on oxygen at bedtime as well as instructed her to start taking all three fluid pills in the morning (1 spirolactone 25mg tablet and 2 lasix 20mg tablets) she wanted to make sure Melissa was ok with this     Please advise

## 2025-07-24 NOTE — PROGRESS NOTES
[facility]  Pulmonary Rehabilitation  Daily Treatment Log    Name: Beth Fregoso : 1935 Date: 2025     Session:6 / approved: 18     Time In/Out:3:30    Non-COPD Charges: [id number]    Physician : Dr. Conway   Dx: J84.9   Non-COPD:     Primary Insurance: Medicare     Secondary Insurance: [place]    /60 / HR 72 / RR 18 / SpO2% 93    Auscultation:  []Clear   [x]Clear and Decreased  []Insp. Wheeze  []Exp. Wheeze     []Rhonchi   []Rales    Target Heart Rate Zone:   Max HR:     Exercise Sp02% Blood Pressure Heart Rate QUINN Scale/Fatigue   Treadmill: Speed:       Min:   % Grade:        Distance:            Bands:#:        Reps:    Min:   Color Band:            6MWT:Min:  Distance:   MET:            Step-ups:#:   Reps:     Min:             Cybex/Vision/Schwinn Bike:  Level:  Speed:  RPM:   Min:   Distance:        Seat:             Hand Weights: Pounds:   Min:   Curls#         Sets:   Presses#    Sets:   Fly#            Sets:   Shoulder Shrugs#     Sets:   Wings#       Sets:   Wrist Curls#   Sets:                               NuStep: Level:  METS:  Min:       SPM:  Total Steps:   Seat:      Arms:            Arm Ergometer: Level:  Min:   RPM:       Fwd:      Back:   Total Turns:            PLB / DB Spinner: Min:   P-Flex: Level:         Min:   IS: Volume:             Min:   Warm-up Stretches: Min:               Education and Training: [x]QUINN Scale  [x]PLB  []DB  []IMT  []IS  [x]Vital Signs   []Target HR Zone  []Exercise Vital Signs [] Safe Vital Signs  []Pulmonary Anatomy  []Lung Function  []Lung Disease Process  []Oxygen Use  []Oxygen Safety  []Pulmonary Meds  []Med Instruction  []Cough Technique  []Pulmonary Hygeine  []Infection Prevention  []Signs of Infection  []When to Call MD  [x]Nutrition  [x]Weight Management  []Advance Directives  []Posture  []Body Mechanics []Energy Conservation  []Pacing ADL's  []Stress/Anxiety Management  []Stretching  []Home Exercise  []Smoking  Cessation   []WA Progress  []Maintenance Exercise Program    []Pulmonary Knowledge Test/Education  []Review PFT  []Review Specific Disease  []Review Home Follow-through Compliance  [x]Safe Exercise [x]Exercise Progression Strategies    Progress Report: Patient was here for continuation of pulmonary rehabilitation..The education topic for today was Nutrition and Weight Management. A healthy diet was explained and the special dietary concerns for people with lung disease was discussed. Why weight matters and strategies for improvement were given. Healthy eating tips were reiterated. The Nutrition and Diet video from the [facility] was watched. Hand outs on what to eat with COPD and Pulmonary Fibrosis were given to the patient. She had to leave to see the Dr. No exercise was performed so there is no charge for today.             Treating Clinicians: [x] Miroslava Heath RRT MD in Office: []BOLIVAR Maravilla  []VALENTINA García  []PAIGE Olmstead  [x]JR Conway    []ALVERTO Cook []DEIRDRE Stone  []VALENTINA Fong  []VALENTINA Chew  []LILIAN Lara []TATYANA Diaz   []ALVERTO Galvan  []BOLIVAR Prado []PAIGE Nice []PAIGE Santo []SHAWN Betts []TATYANA Baires []ALVERTO Valenzuela

## 2025-07-28 ENCOUNTER — OFFICE VISIT (OUTPATIENT)
Dept: CARDIOLOGY | Facility: HOSPITAL | Age: OVER 89
End: 2025-07-28
Payer: MEDICARE

## 2025-07-28 VITALS
RESPIRATION RATE: 18 BRPM | HEIGHT: 60 IN | HEART RATE: 70 BPM | DIASTOLIC BLOOD PRESSURE: 63 MMHG | BODY MASS INDEX: 31.86 KG/M2 | WEIGHT: 162.31 LBS | SYSTOLIC BLOOD PRESSURE: 146 MMHG | OXYGEN SATURATION: 94 %

## 2025-07-28 DIAGNOSIS — E78.2 MIXED HYPERLIPIDEMIA: ICD-10-CM

## 2025-07-28 DIAGNOSIS — I35.1 NONRHEUMATIC AORTIC VALVE INSUFFICIENCY: ICD-10-CM

## 2025-07-28 DIAGNOSIS — I50.33 ACUTE ON CHRONIC HEART FAILURE WITH PRESERVED EJECTION FRACTION (HFPEF): Primary | ICD-10-CM

## 2025-07-28 LAB — ABSOLUTE LUNG FLUID CONTENT: 37 % (ref 20–35)

## 2025-07-28 PROCEDURE — 1159F MED LIST DOCD IN RCRD: CPT | Performed by: NURSE PRACTITIONER

## 2025-07-28 PROCEDURE — 99214 OFFICE O/P EST MOD 30 MIN: CPT | Performed by: NURSE PRACTITIONER

## 2025-07-28 PROCEDURE — 1160F RVW MEDS BY RX/DR IN RCRD: CPT | Performed by: NURSE PRACTITIONER

## 2025-07-28 NOTE — PROGRESS NOTES
"Chief Complaint  Congestive Heart Failure and Follow-up    Subjective    History of Present Illness {CC  Problem List  Visit  Diagnosis   Encounters  Notes  Medications  Labs  Result Review Imaging  Media :23}       History of Present Illness   89-year-old female presents to the office today for ongoing evaluation of her acute on chronic HFpEF.  She reports that she has been experiencing problems with fluid over the last few weeks.  Has taken increased doses of Lasix with very little improvement in symptoms.  She does report dyspnea on exertion as well as abdominal fullness and fatigue which are new symptoms for her.  she has a history of hypertension, hyperlipidemia, valvular heart disease, disequilibrium syndrome, bilateral cataracts, GERD, personal history of breast cancer, anxiety, essential tremor, spinal stenosis, asthma, interstitial lung disease. Currently denies cp, dizziness, palpitations, presyncope and syncope. Was seen last week in HF clinic and received iv lasix. She presents today with home weight log which shows a weight of 166 lbs on 7/21 and a home weight today of 159lbs. Notes that she is breathing much easier and pedal edema has improved but has not resolved. Did recently start pulmonary rehab.   Objective     Vital Signs:   Vitals:    07/28/25 1017   BP: 146/63   BP Location: Right arm   Patient Position: Sitting   Cuff Size: Adult   Pulse: 70   Resp: 18   SpO2: 94%   Weight: 73.6 kg (162 lb 5 oz)   Height: 152 cm (59.84\")     Body mass index is 31.87 kg/m².  Physical Exam  Vitals and nursing note reviewed.   Constitutional:       Appearance: Normal appearance.   HENT:      Head: Normocephalic.   Eyes:      Pupils: Pupils are equal, round, and reactive to light.   Cardiovascular:      Rate and Rhythm: Normal rate and regular rhythm.      Pulses: Normal pulses.      Heart sounds: Normal heart sounds. No murmur heard.  Pulmonary:      Effort: Pulmonary effort is normal.      Breath " sounds: Rales present.   Abdominal:      General: Bowel sounds are normal. There is distension.   Musculoskeletal:         General: Normal range of motion.      Cervical back: Normal range of motion.      Right lower leg: Edema (2+ pititng ankle to knee) present.      Left lower leg: Edema (2+ pitting ankle to knee) present.   Skin:     General: Skin is warm and dry.      Capillary Refill: Capillary refill takes less than 2 seconds.   Neurological:      Mental Status: She is alert and oriented to person, place, and time.   Psychiatric:         Mood and Affect: Mood normal.         Thought Content: Thought content normal.              Result Review  Data Reviewed:{ Labs  Result Review  Imaging  Med Tab  Media :23}   Basic Metabolic Panel (07/21/2025 12:29)  proBNP (07/21/2025 12:29)  Comprehensive Metabolic Panel (06/13/2025 14:13)  proBNP (06/13/2025 14:13)    Adult Transthoracic Echo Complete W/ Cont if Necessary Per Protocol (09/07/2024 10:10)     I have reviewed this documentation, made edits where appropriate, and agree with the final report of ReDS data/interpretation. In addition, I have the following to add:    Lung fluid content by ReDS assessment correlates to pulmonary capillary wedge pressure (Pascual et al. JAHA 2018). In patients with LEFT-sided heart failure, elevated readings suggest that the patient MAY benefit from additional diuresis while low readings suggest that the patient MAY benefit from a reduction in diuretics; clinical correlation is recommended. Low normal and mildly elevated readings may be appropriate for some patients. In patients with RIGHT-sided heart failure, lung fluid content may be a less reliable marker for guiding diuresis.           Assessment and Plan {CC Problem List  Visit Diagnosis  ROS  Review (Popup)  [facility] Maintenance  Quality  BestPractice  Medications  SmartSets  SnapShot Encounters  Media :23}   1. Acute on chronic heart failure with preserved ejection  fraction (HFpEF)  Nyha II  - ReDs Vest 37  Begin Aldactone 25 mg daily   Increase lasix to 40 mg qam and 20 mg qpm for next 3 days   Heart failure education today including signs and symptoms, the role of the heart failure center, daily weights, low sodium diet (less than 1500 mg per day), and daily physical activity. Reviewed HF Zones with patient and family.  Patient to continue current medications as previously ordered.   2. Nonrheumatic aortic valve insufficiency  Stable     3. Mixed hyperlipidemia  Diet controlled       Follow Up {Instructions Charge Capture  Follow-up Communications :23}   Return in 1 week (on 8/4/2025), or if symptoms worsen or fail to improve, for Office visit, HF.    Patient was given instructions and counseling regarding her condition or for health maintenance advice. Please see specific information pulled into the AVS if appropriate.  Patient was instructed to call the [facility] with any questions, concerns, or worsening symptoms.

## 2025-07-28 NOTE — PROGRESS NOTES
Heart Failure Clinic    Date:  07/28/25     Vitals:    07/28/25 1017   BP: 146/63   Pulse: 70   Resp: 18   SpO2: 94%        Indication:  Heart Failure     Procedure:  ReDS device sensor unit applied to right side of chest and right side of back.  Appropriate positioning confirmed based off of the unit's calculation.  Chest measurement obtained with the chest size ruler.  Measurement session performed over 45 seconds.      Method of arrival:  Ambulatory with walker    Weighing self daily:  Yes    Taking medications as prescribed:  Yes    Edema:  1+    Shortness of Air:  Yes    Number of pillows used at night:  <2    Results:  ReDS Value=    37                      Interpretation:  36-38% - mildly elevated lung fluid content    Melissa Velasco, FELIX 07/28/25 12:51 EDT

## 2025-07-28 NOTE — PATIENT INSTRUCTIONS
Please continue lasix 2 tablets in am and 1 again around 1/2 pm in the afternoon   Melissa or Eunice will call you on Thursday

## 2025-07-30 ENCOUNTER — CLINICAL SUPPORT (OUTPATIENT)
Dept: PULMONOLOGY | Facility: CLINIC | Age: OVER 89
End: 2025-07-30
Payer: MEDICARE

## 2025-07-30 DIAGNOSIS — J84.9 INTERSTITIAL LUNG DISEASE: Primary | ICD-10-CM

## 2025-07-30 PROCEDURE — G0239 OTH RESP PROC, GROUP: HCPCS | Performed by: INTERNAL MEDICINE

## 2025-07-31 ENCOUNTER — TELEPHONE (OUTPATIENT)
Dept: CARDIOLOGY | Facility: HOSPITAL | Age: OVER 89
End: 2025-07-31
Payer: MEDICARE

## 2025-07-31 NOTE — TELEPHONE ENCOUNTER
Patient down to 156 lbs and no shortness or edema. Wears same clothing every morning when she weighs.

## 2025-07-31 NOTE — PROGRESS NOTES
[facility]  Pulmonary Rehabilitation  Daily Treatment Log    Name: Beth Fregoso : 1935 Date: 2025     Session:7 / approved: 18     Time In/Out:2 / 3:30   Non-COPD Charges: [id number]    Physician :Dr. Conway    Dx:J84.9    Non-COPD:     Primary Insurance:Medicare     Secondary Insurance: [place]     /80 / HR 73 / RR 18 / SpO2% 94    Auscultation:  []Clear   [x]Clear and Decreased  []Insp. Wheeze  []Exp. Wheeze     []Rhonchi   []Rales    Target Heart Rate Zone:   Max HR:73     Exercise Sp02% Blood Pressure Heart Rate QUINN Scale/Fatigue   Treadmill: Speed:       Min:   % Grade:        Distance:            Bands:#:        Reps:    Min:   Color Band:            6MWT:Min:  Distance:   MET:            Step-ups:#:   Reps:     Min:             Cybex/Vision/Schwinn Bike:  Level:  Speed:  RPM:   Min:   Distance:        Seat:             Hand Weights: Pounds:3   Min:10   Curls#  10       Sets:3   Presses# 10   Sets: 3  Fly#            Sets:   Shoulder Shrugs# 10    Sets: 3  Wings#       Sets:   Wrist Curls#   Sets:                      93  118/64  68  0   NuStep: Level:2  METS:1.8  Min:20       SPM: 68  Total Steps:1225   Seat:4      Arms: 9  94  118/76  69  0   Arm Ergometer: Level:  Min:   RPM:       Fwd:      Back:   Total Turns:            PLB / DB : Min:10   P-Flex: Level:         Min:   IS: Volume:             Min:   Warm-up Stretches: Min:10               Education and Training: [x]QUINN Scale  [x]PLB  [x]DB  []IMT  []IS  [x]Vital Signs   [x]Target HR Zone  [x]Exercise Vital Signs [x] Safe Vital Signs  []Pulmonary Anatomy  []Lung Function  []Lung Disease Process  []Oxygen Use  []Oxygen Safety  []Pulmonary Meds  []Med Instruction  []Cough Technique  []Pulmonary Hygeine  []Infection Prevention  []Signs of Infection  []When to Call MD  []Nutrition  []Weight Management  []Advance Directives  []Posture  []Body Mechanics [x]Energy Conservation  [x]Pacing ADL's  [x]Stress/Anxiety  Management  [x]Stretching  [x]Home Exercise  []Smoking Cessation   [x]IN Progress  []Maintenance Exercise Program    []Pulmonary Knowledge Test/Education  []Review PFT  []Review Specific Disease  []Review Home Follow-through Compliance  [x]Safe Exercise [x]Exercise Progression Strategies    Progress Report: Patient was here for continuation of pulmonary rehabilitation. She was able to maintain an SpO2% greater than or equal to 90 throughout exercise.She practiced pursed lip breathing, pacing, and energy conservation throughout testing and exercise.She is utilizing the skills that she has learned outside of class. The education topic for today was Mental Health. A video from the [facility] was watched. Topics covered were stress management, relaxation techniques, and managing depression.The importance of exercise with a lung condition was emphasized.            Treating Clinicians: [x] Miroslava Heath RRT MD in Office: []BOLIVAR Maravilla  []VALENTINA García  []PAIGE Olmstead  []JR Conway    []ALVERTO Cook []DEIRDRE Stone  []VALENTINA Fong  []VALENTINA Chew  []LILIAN Lara []TATYANA Diaz   []ALVERTO Galvan  [x]BOLIVAR Prado []PAIGE Nice []PAIGE Santo []SHAWN Betts []TATYANA Baires []ALVERTO Valenzuela

## 2025-08-01 ENCOUNTER — CLINICAL SUPPORT (OUTPATIENT)
Dept: PULMONOLOGY | Facility: CLINIC | Age: OVER 89
End: 2025-08-01
Payer: MEDICARE

## 2025-08-01 DIAGNOSIS — J84.9 INTERSTITIAL LUNG DISEASE: Primary | ICD-10-CM

## 2025-08-01 DIAGNOSIS — J44.9 CHRONIC OBSTRUCTIVE PULMONARY DISEASE, UNSPECIFIED COPD TYPE: ICD-10-CM

## 2025-08-01 PROCEDURE — 94625 PHY/QHP OP PULM RHB W/O MNTR: CPT | Performed by: INTERNAL MEDICINE

## 2025-08-01 RX ORDER — FLUTICASONE FUROATE AND VILANTEROL TRIFENATATE 200; 25 UG/1; UG/1
1 POWDER RESPIRATORY (INHALATION) DAILY
Qty: 60 EACH | Refills: 2 | Status: SHIPPED | OUTPATIENT
Start: 2025-08-01

## 2025-08-01 NOTE — PROGRESS NOTES
[facility]  Pulmonary Rehabilitation  Daily Treatment Log    Name: Beth Fregoso : 1935 Date: 2025     Session:8 / approved: 18     Time In/Out:2 /3:30    Non-COPD Charges: [id number]    Physician :Dr. Conway    Dx: J84.9   Non-COPD:     Primary Insurance:Medicare     Secondary Insurance: [employer]    /62 / HR 70 / RR 16 /SpO2% 94    Auscultation:  []Clear   [x]Clear and Decreased  []Insp. Wheeze  []Exp. Wheeze     []Rhonchi   []Rales    Target Heart Rate Zone:   Max HR: 70    Exercise Sp02% Blood Pressure Heart Rate QUINN Scale/Fatigue   Treadmill: Speed:       Min:   % Grade:        Distance:            Bands:#:        Reps:    Min:   Color Band:            6MWT:Min:  Distance:   MET:            Step-ups:#:   Reps:     Min:             Cybex/Vision/Schwinn Bike:  Level:  Speed:  RPM:   Min:   Distance:        Seat:             Hand Weights: Pounds:   Min:   Curls#         Sets:   Presses#    Sets:   Fly#            Sets:   Shoulder Shrugs#     Sets:   Wings#       Sets:   Wrist Curls#   Sets:                               NuStep: Level:2  METS:1.6  Min:25       SPM: 64  Total Steps:1599   Seat:6      Arms: 9  94  122/78  67  0   Arm Ergometer: Level:  Min:   RPM:       Fwd:      Back:   Total Turns:            PLB / DB : Min:10   P-Flex: Level:         Min:   IS: Volume:             Min:   Warm-up Stretches: Min: 10              Education and Training: [x]QUINN Scale  [x]PLB  []DB  []IMT  []IS  [x]Vital Signs   [x]Target HR Zone  [x]Exercise Vital Signs [x] Safe Vital Signs  []Pulmonary Anatomy  []Lung Function  []Lung Disease Process  [x]Oxygen Use  [x]Oxygen Safety  []Pulmonary Meds  []Med Instruction  []Cough Technique  []Pulmonary Hygeine  []Infection Prevention  []Signs of Infection  []When to Call MD  []Nutrition  []Weight Management  []Advance Directives  []Posture  []Body Mechanics [x]Energy Conservation  [x]Pacing ADL's  []Stress/Anxiety Management  [x]Stretching   [x]Home Exercise  []Smoking Cessation   []VT Progress  []Maintenance Exercise Program    []Pulmonary Knowledge Test/Education  []Review PFT  []Review Specific Disease  []Review Home Follow-through Compliance  [x]Safe Exercise [x]Exercise Progression Strategies    Progress Report: Patient was here for continuation of pulmonary rehabilitation.She was able to maintain an SpO2% greater than or equal to 90 throughout exercise.She practiced pursed lip breathing, pacing, and energy conservation throughout  exercise.The education topic for today was Oxygen Use and Safety.Four videos from the [employer] were watched. Information in the video included types of oxygen concentrators, proper use of oxygen, and using oxygen safely. This session was completed in a group of two or more individuals.          Treating Clinicians: [x] Miroslava Heath RRT MD in Office: []BOLIVAR Maravilla  []VALENTINA García  []PAIGE Olmstead  []JR Conway    []ALVERTO Cook []DEIRDRE Stone  []VALENTINA Fong  []VALENTINA Chew  []LILIAN Lara []TATYANA Diaz   []ALVERTO Galvan  [x]BOLIVAR Prado []PAIGE Nice []PAIGE Santo []SHAWN Betts []TATYANA Baires []ALVERTO Valenzuela

## 2025-08-04 ENCOUNTER — OFFICE VISIT (OUTPATIENT)
Dept: CARDIOLOGY | Facility: HOSPITAL | Age: OVER 89
End: 2025-08-04
Payer: MEDICARE

## 2025-08-04 VITALS
HEART RATE: 63 BPM | BODY MASS INDEX: 31.25 KG/M2 | HEIGHT: 59 IN | OXYGEN SATURATION: 94 % | WEIGHT: 155 LBS | DIASTOLIC BLOOD PRESSURE: 60 MMHG | SYSTOLIC BLOOD PRESSURE: 138 MMHG | RESPIRATION RATE: 16 BRPM

## 2025-08-04 DIAGNOSIS — I50.32 CHRONIC HEART FAILURE WITH PRESERVED EJECTION FRACTION (HFPEF): Primary | ICD-10-CM

## 2025-08-04 DIAGNOSIS — I35.1 NONRHEUMATIC AORTIC VALVE INSUFFICIENCY: ICD-10-CM

## 2025-08-04 DIAGNOSIS — E78.2 MIXED HYPERLIPIDEMIA: ICD-10-CM

## 2025-08-04 LAB
ABSOLUTE LUNG FLUID CONTENT: 33 % (ref 20–35)
ANION GAP SERPL CALCULATED.3IONS-SCNC: 10 MMOL/L (ref 5–15)
BUN SERPL-MCNC: 19 MG/DL (ref 8–23)
BUN/CREAT SERPL: 30.2 (ref 7–25)
CALCIUM SPEC-SCNC: 9.1 MG/DL (ref 8.6–10.5)
CHLORIDE SERPL-SCNC: 98 MMOL/L (ref 98–107)
CO2 SERPL-SCNC: 29 MMOL/L (ref 22–29)
CREAT SERPL-MCNC: 0.63 MG/DL (ref 0.57–1)
EGFRCR SERPLBLD CKD-EPI 2021: 84.9 ML/MIN/1.73
GLUCOSE SERPL-MCNC: 84 MG/DL (ref 65–99)
POTASSIUM SERPL-SCNC: 4.5 MMOL/L (ref 3.5–5.2)
SODIUM SERPL-SCNC: 137 MMOL/L (ref 136–145)

## 2025-08-04 PROCEDURE — 99214 OFFICE O/P EST MOD 30 MIN: CPT | Performed by: NURSE PRACTITIONER

## 2025-08-04 PROCEDURE — 80048 BASIC METABOLIC PNL TOTAL CA: CPT | Performed by: NURSE PRACTITIONER

## 2025-08-04 PROCEDURE — 1160F RVW MEDS BY RX/DR IN RCRD: CPT | Performed by: NURSE PRACTITIONER

## 2025-08-04 PROCEDURE — 1159F MED LIST DOCD IN RCRD: CPT | Performed by: NURSE PRACTITIONER

## 2025-08-04 PROCEDURE — 94726 PLETHYSMOGRAPHY LUNG VOLUMES: CPT | Performed by: NURSE PRACTITIONER

## 2025-08-11 ENCOUNTER — CLINICAL SUPPORT (OUTPATIENT)
Dept: PULMONOLOGY | Facility: CLINIC | Age: OVER 89
End: 2025-08-11
Payer: MEDICARE

## 2025-08-13 ENCOUNTER — CLINICAL SUPPORT (OUTPATIENT)
Dept: PULMONOLOGY | Facility: CLINIC | Age: OVER 89
End: 2025-08-13
Payer: MEDICARE

## 2025-08-13 DIAGNOSIS — J84.9 INTERSTITIAL LUNG DISEASE: Primary | ICD-10-CM

## 2025-08-13 PROCEDURE — G0239 OTH RESP PROC, GROUP: HCPCS | Performed by: INTERNAL MEDICINE

## 2025-08-18 ENCOUNTER — CLINICAL SUPPORT (OUTPATIENT)
Dept: PULMONOLOGY | Facility: CLINIC | Age: OVER 89
End: 2025-08-18
Payer: MEDICARE

## 2025-08-18 DIAGNOSIS — J84.9 INTERSTITIAL LUNG DISEASE: Primary | ICD-10-CM

## 2025-08-18 PROCEDURE — G0239 OTH RESP PROC, GROUP: HCPCS | Performed by: INTERNAL MEDICINE

## 2025-08-18 RX ORDER — AMLODIPINE BESYLATE 5 MG/1
5 TABLET ORAL
Qty: 90 TABLET | Refills: 0 | Status: SHIPPED | OUTPATIENT
Start: 2025-08-18

## 2025-08-20 ENCOUNTER — CLINICAL SUPPORT (OUTPATIENT)
Dept: PULMONOLOGY | Facility: CLINIC | Age: OVER 89
End: 2025-08-20
Payer: MEDICARE

## 2025-08-20 DIAGNOSIS — J84.9 INTERSTITIAL LUNG DISEASE: Primary | ICD-10-CM

## 2025-08-20 PROCEDURE — G0239 OTH RESP PROC, GROUP: HCPCS | Performed by: INTERNAL MEDICINE

## 2025-08-22 ENCOUNTER — OFFICE VISIT (OUTPATIENT)
Dept: PULMONOLOGY | Facility: CLINIC | Age: OVER 89
End: 2025-08-22
Payer: MEDICARE

## 2025-08-22 VITALS
WEIGHT: 155.1 LBS | DIASTOLIC BLOOD PRESSURE: 62 MMHG | SYSTOLIC BLOOD PRESSURE: 120 MMHG | HEART RATE: 67 BPM | OXYGEN SATURATION: 95 % | HEIGHT: 59 IN | BODY MASS INDEX: 31.27 KG/M2 | TEMPERATURE: 96.3 F

## 2025-08-22 DIAGNOSIS — J45.909 IDIOPATHIC ASTHMA: ICD-10-CM

## 2025-08-22 DIAGNOSIS — R91.8 GROUND GLASS OPACITY PRESENT ON IMAGING OF LUNG: ICD-10-CM

## 2025-08-22 DIAGNOSIS — R06.02 SHORTNESS OF BREATH: Primary | ICD-10-CM

## 2025-08-22 PROCEDURE — 1160F RVW MEDS BY RX/DR IN RCRD: CPT | Performed by: NURSE PRACTITIONER

## 2025-08-22 PROCEDURE — 99213 OFFICE O/P EST LOW 20 MIN: CPT | Performed by: NURSE PRACTITIONER

## 2025-08-22 PROCEDURE — 1159F MED LIST DOCD IN RCRD: CPT | Performed by: NURSE PRACTITIONER

## 2025-08-25 ENCOUNTER — CLINICAL SUPPORT (OUTPATIENT)
Dept: PULMONOLOGY | Facility: CLINIC | Age: OVER 89
End: 2025-08-25
Payer: MEDICARE

## 2025-08-25 DIAGNOSIS — J84.9 INTERSTITIAL LUNG DISEASE: Primary | ICD-10-CM

## 2025-08-25 PROCEDURE — G0239 OTH RESP PROC, GROUP: HCPCS | Performed by: INTERNAL MEDICINE

## 2025-08-27 ENCOUNTER — CLINICAL SUPPORT (OUTPATIENT)
Dept: PULMONOLOGY | Facility: CLINIC | Age: OVER 89
End: 2025-08-27
Payer: MEDICARE

## 2025-08-27 DIAGNOSIS — J84.9 INTERSTITIAL LUNG DISEASE: Primary | ICD-10-CM

## 2025-08-27 PROCEDURE — G0239 OTH RESP PROC, GROUP: HCPCS | Performed by: INTERNAL MEDICINE

## 2025-08-29 ENCOUNTER — OFFICE VISIT (OUTPATIENT)
Dept: INTERNAL MEDICINE | Facility: CLINIC | Age: OVER 89
End: 2025-08-29
Payer: MEDICARE

## 2025-08-29 ENCOUNTER — LAB (OUTPATIENT)
Dept: LAB | Facility: HOSPITAL | Age: OVER 89
End: 2025-08-29
Payer: MEDICARE

## 2025-08-29 VITALS
TEMPERATURE: 98.9 F | HEIGHT: 59 IN | HEART RATE: 76 BPM | SYSTOLIC BLOOD PRESSURE: 122 MMHG | DIASTOLIC BLOOD PRESSURE: 60 MMHG | BODY MASS INDEX: 31.57 KG/M2 | OXYGEN SATURATION: 95 % | WEIGHT: 156.6 LBS

## 2025-08-29 DIAGNOSIS — Z00.00 MEDICARE ANNUAL WELLNESS VISIT, SUBSEQUENT: Primary | ICD-10-CM

## 2025-08-29 DIAGNOSIS — R06.09 DOE (DYSPNEA ON EXERTION): ICD-10-CM

## 2025-08-29 DIAGNOSIS — E78.5 HYPERLIPIDEMIA LDL GOAL <100: ICD-10-CM

## 2025-08-29 DIAGNOSIS — I10 ESSENTIAL HYPERTENSION: ICD-10-CM

## 2025-08-29 DIAGNOSIS — J44.9 CHRONIC OBSTRUCTIVE PULMONARY DISEASE, UNSPECIFIED COPD TYPE: ICD-10-CM

## 2025-08-29 DIAGNOSIS — Z13.29 THYROID DISORDER SCREEN: ICD-10-CM

## 2025-08-29 DIAGNOSIS — I50.32 CHRONIC DIASTOLIC CHF (CONGESTIVE HEART FAILURE): ICD-10-CM

## 2025-08-29 DIAGNOSIS — I38 HEART VALVE DISORDER: ICD-10-CM

## 2025-08-29 PROBLEM — R10.2 SUPRAPUBIC PAIN: Status: RESOLVED | Noted: 2023-12-27 | Resolved: 2025-08-29

## 2025-08-29 PROBLEM — Z01.419 NORMAL GYNECOLOGIC EXAMINATION: Status: RESOLVED | Noted: 2020-09-14 | Resolved: 2025-08-29

## 2025-08-29 PROCEDURE — 36415 COLL VENOUS BLD VENIPUNCTURE: CPT

## 2025-08-29 PROCEDURE — 84443 ASSAY THYROID STIM HORMONE: CPT

## 2025-08-29 PROCEDURE — 80048 BASIC METABOLIC PNL TOTAL CA: CPT

## 2025-08-29 PROCEDURE — 80061 LIPID PANEL: CPT

## 2025-08-29 PROCEDURE — 85025 COMPLETE CBC W/AUTO DIFF WBC: CPT

## 2025-08-29 RX ORDER — MULTIPLE VITAMINS W/ MINERALS TAB 9MG-400MCG
1 TAB ORAL DAILY
COMMUNITY

## 2025-08-30 LAB
ANION GAP SERPL CALCULATED.3IONS-SCNC: 12.3 MMOL/L (ref 5–15)
BASOPHILS # BLD AUTO: 0.03 10*3/MM3 (ref 0–0.2)
BASOPHILS NFR BLD AUTO: 0.3 % (ref 0–1.5)
BUN SERPL-MCNC: 23 MG/DL (ref 8–23)
BUN/CREAT SERPL: 32.4 (ref 7–25)
CALCIUM SPEC-SCNC: 9.8 MG/DL (ref 8.6–10.5)
CHLORIDE SERPL-SCNC: 99 MMOL/L (ref 98–107)
CHOLEST SERPL-MCNC: 215 MG/DL (ref 0–200)
CO2 SERPL-SCNC: 26.7 MMOL/L (ref 22–29)
CREAT SERPL-MCNC: 0.71 MG/DL (ref 0.57–1)
DEPRECATED RDW RBC AUTO: 41 FL (ref 37–54)
EGFRCR SERPLBLD CKD-EPI 2021: 81.4 ML/MIN/1.73
EOSINOPHIL # BLD AUTO: 0.24 10*3/MM3 (ref 0–0.4)
EOSINOPHIL NFR BLD AUTO: 2.6 % (ref 0.3–6.2)
ERYTHROCYTE [DISTWIDTH] IN BLOOD BY AUTOMATED COUNT: 12.7 % (ref 12.3–15.4)
GLUCOSE SERPL-MCNC: 74 MG/DL (ref 65–99)
HCT VFR BLD AUTO: 41.5 % (ref 34–46.6)
HDLC SERPL-MCNC: 59 MG/DL (ref 40–60)
HGB BLD-MCNC: 13.3 G/DL (ref 12–15.9)
IMM GRANULOCYTES # BLD AUTO: 0.03 10*3/MM3 (ref 0–0.05)
IMM GRANULOCYTES NFR BLD AUTO: 0.3 % (ref 0–0.5)
LDLC SERPL CALC-MCNC: 146 MG/DL (ref 0–100)
LDLC/HDLC SERPL: 2.45 {RATIO}
LYMPHOCYTES # BLD AUTO: 1.19 10*3/MM3 (ref 0.7–3.1)
LYMPHOCYTES NFR BLD AUTO: 12.7 % (ref 19.6–45.3)
MCH RBC QN AUTO: 28.2 PG (ref 26.6–33)
MCHC RBC AUTO-ENTMCNC: 32 G/DL (ref 31.5–35.7)
MCV RBC AUTO: 87.9 FL (ref 79–97)
MONOCYTES # BLD AUTO: 0.78 10*3/MM3 (ref 0.1–0.9)
MONOCYTES NFR BLD AUTO: 8.3 % (ref 5–12)
NEUTROPHILS NFR BLD AUTO: 7.11 10*3/MM3 (ref 1.7–7)
NEUTROPHILS NFR BLD AUTO: 75.8 % (ref 42.7–76)
NRBC BLD AUTO-RTO: 0 /100 WBC (ref 0–0.2)
PLATELET # BLD AUTO: 216 10*3/MM3 (ref 140–450)
PMV BLD AUTO: 10.2 FL (ref 6–12)
POTASSIUM SERPL-SCNC: 4.9 MMOL/L (ref 3.5–5.2)
RBC # BLD AUTO: 4.72 10*6/MM3 (ref 3.77–5.28)
SODIUM SERPL-SCNC: 138 MMOL/L (ref 136–145)
TRIGL SERPL-MCNC: 58 MG/DL (ref 0–150)
TSH SERPL DL<=0.05 MIU/L-ACNC: 1.41 UIU/ML (ref 0.27–4.2)
VLDLC SERPL-MCNC: 10 MG/DL (ref 5–40)
WBC NRBC COR # BLD AUTO: 9.38 10*3/MM3 (ref 3.4–10.8)